# Patient Record
Sex: FEMALE | Race: WHITE | NOT HISPANIC OR LATINO | Employment: FULL TIME | URBAN - METROPOLITAN AREA
[De-identification: names, ages, dates, MRNs, and addresses within clinical notes are randomized per-mention and may not be internally consistent; named-entity substitution may affect disease eponyms.]

---

## 2021-04-08 DIAGNOSIS — Z23 ENCOUNTER FOR IMMUNIZATION: ICD-10-CM

## 2024-02-26 ENCOUNTER — EVALUATION (OUTPATIENT)
Dept: PHYSICAL THERAPY | Facility: CLINIC | Age: 59
End: 2024-02-26
Payer: COMMERCIAL

## 2024-02-26 VITALS — DIASTOLIC BLOOD PRESSURE: 80 MMHG | SYSTOLIC BLOOD PRESSURE: 115 MMHG

## 2024-02-26 DIAGNOSIS — M25.521 RIGHT ELBOW PAIN: Primary | ICD-10-CM

## 2024-02-26 PROCEDURE — 97110 THERAPEUTIC EXERCISES: CPT

## 2024-02-26 PROCEDURE — 97161 PT EVAL LOW COMPLEX 20 MIN: CPT

## 2024-02-26 NOTE — PROGRESS NOTES
PT Evaluation     Today's date: 2024  Patient name: Theresa Kim  : 1965  MRN: [id number]  Referring provider: Joselin Brumfield DO  Dx:   Encounter Diagnosis     ICD-10-CM    1. Right elbow pain  M25.521           Start Time: 0815  Stop Time: 0855  Total time in clinic (min): 40 minutes    Assessment  Assessment details: Theresa is a pleasant 57 yo female presenting to OP PT secondary to Right elbow pain with insidious onset 1 year ago. She denies DALLIN, and reports no numbness/tingling. Pt presents with wrist and elbow strength deficits, periscap strength deficits, and pain at end range elbow/flex ext. Pain is increased along triceps with palmation and increase posterior medial elbow pain with valgus stress test. Pt would benefit from skilled PT to address stated deficits and improve tolerance to daily activiites and improve maximal function.  Impairments: impaired physical strength and pain with function    Symptom irritability: moderateUnderstanding of Dx/Px/POC: good   Prognosis: good    Goals  Pt will demonstrate [place] with progressive home exercise program to assist with PT carry over and prevent recurrence of symptoms in the future  Pt will demonstrate 20% improvement in FOTO score to increase tolerance to functional return.   Pt will demonstrate 20 deg improvement in shoulder ROM to increase tolerance to reaching overhead, behind neck, and behind back to increase ease with ADLs such dressing/bathing  Pt will demonstrate 4+/5 in UE strength to allow for improved tolerance to lifting items overhead.   Pt will demonstrate 4+/5 in periscap strength to improve posture in sitting.      Plan  Patient would benefit from: PT eval and skilled physical therapy  Planned modality interventions: TENS, manual electrical stimulation, low level laser therapy, thermotherapy: hydrocollator packs and cryotherapy  Planned therapy interventions: IASTM, joint mobilization, manual therapy, massage, nerve  gliding, patient education, neuromuscular re-education, stretching, strengthening, therapeutic activities and therapeutic exercise  Frequency: 2x week  Plan of Care beginning date: 2024  Plan of Care expiration date: 2024  Treatment plan discussed with: patient        Subjective Evaluation    History of Present Illness  Mechanism of injury: Theresa is a 59 yo female presenting to OP PT secondary to Right elbow pain with onset 1 year ago. Pt reports sx are aggravated with working on computer using mouse and with maintaining elbow in end range positions. She reports weakness with carrying items, working overhead, and completing yard work. She reports some relief with Advil, massage, and compression stretch.          Recurrent probem    Quality of life: good    Patient Goals  Patient goals for therapy: increased strength  Patient goal: Stronger and less pain;  Pain  Current pain ratin  At best pain ratin  At worst pain ratin  Location: Elbow  Quality: dull ache  Aggravating factors: lifting, sitting, standing and stair climbing    Social Support  Stairs in house: yes   Lives in: multiple-level home  Lives with: significant other    Employment status: working  Hand dominance: right      Diagnostic Tests  X-ray: normal  Treatments  No previous or current treatments        Objective     Observations     Right Elbow   Negative for edema, effusion and incisions.     Palpation     Right   Tenderness of the triceps and wrist flexors.     Active Range of Motion     Right Elbow   Normal active range of motion  Elbow hyperextension  Extension: with pain    Passive Range of Motion     Right Elbow   Normal passive range of motion  Extension: with pain    Joint Play     Right Elbow   Joints within functional limits are the humeroulnar joint, humeroradial joint and proximal radioulnar joint.     Strength/Myotome Testing     Right Elbow   Flexion: 4  Extension: 4  Forearm supination: 4  Forearm pronation:  4    Right Wrist/Hand   Wrist extension: 4-  Wrist flexion: 4-  Radial deviation: 3+  Ulnar deviation: 3+    Tests     Right Elbow   Positive active medial epicondylitis, moving valgus stress, passive medial epicondylitis and valgus stress at 30 degrees.   Negative Cozen's, elbow flexion, handshake, milking maneuver, pronator compression, radial tunnel and valgus stress at 0 degrees.       Diagnosis:  Right elbow pain   Precautions:  HTN   Comparable signs 1) End range elbow extension   2) pain with wrist flexion  3)    Primary Impairments: 1)  Right wrist and elbow weakness  2) Right periscap strength deficits   Patient Goals Work at her computer without discomfort               Precautions: No past medical history on file. HTN    Date 2/26            Visit # 1            FOTO done            Re-eval                     Manuals 2/26            PROM             Elbow mobilization             Taping Tricep FH                         Neuro Re-Ed             Rhythmic stab             Prone Ys, Ts, Is 2x10 Ys, Ts                                                                             Ther Ex             Elbow flex/ext             Tricep ext             Rows, Ext              Wrist flex, ext 10''x3             strengthening              No moneys              UBE                          Ther Activity                                       Gait Training                                       Modalities

## 2024-02-26 NOTE — LETTER
2024      No Recipients    Patient: Theresa Kim   YOB: 1965   Date of Visit: 2024     Encounter Diagnosis     ICD-10-CM    1. Right elbow pain  M25.521           Dear Dr. Brumfield:    Thank you for your recent referral of Theresa Kim. Please review the attached evaluation summary from Theresa's recent visit.     Please verify that you agree with the plan of care by signing the attached order.     If you have any questions or concerns, please do not hesitate to call.     I sincerely appreciate the opportunity to share in the care of one of your patients and hope to have another opportunity to work with you in the near future.       Sincerely,    Elvi Greene, PT      Referring Provider:      I certify that I have read the below Plan of Care and certify the need for these services furnished under this plan of treatment while under my care.                    Joselin Brumfield DO  [address] [place] [id number]  Via Fax: [phone]          PT Evaluation     Today's date: 2024  Patient name: Theresa Kim  : 1965  MRN: [id number]  Referring provider: Joselin Brumfield DO  Dx:   Encounter Diagnosis     ICD-10-CM    1. Right elbow pain  M25.521           Start Time: 0815  Stop Time: 0855  Total time in clinic (min): 40 minutes    Assessment  Assessment details: Theresa is a pleasant 57 yo female presenting to OP PT secondary to Right elbow pain with insidious onset 1 year ago. She denies DALLIN, and reports no numbness/tingling. Pt presents with wrist and elbow strength deficits, periscap strength deficits, and pain at end range elbow/flex ext. Pain is increased along triceps with palmation and increase posterior medial elbow pain with valgus stress test. Pt would benefit from skilled PT to address stated deficits and improve tolerance to daily activiites and improve maximal function.  Impairments: impaired physical strength and pain with  function    Symptom irritability: moderateUnderstanding of Dx/Px/POC: good   Prognosis: good    Goals  Pt will demonstrate [place] with progressive home exercise program to assist with PT carry over and prevent recurrence of symptoms in the future  Pt will demonstrate 20% improvement in FOTO score to increase tolerance to functional return.   Pt will demonstrate 20 deg improvement in shoulder ROM to increase tolerance to reaching overhead, behind neck, and behind back to increase ease with ADLs such dressing/bathing  Pt will demonstrate 4+/5 in UE strength to allow for improved tolerance to lifting items overhead.   Pt will demonstrate 4+/5 in periscap strength to improve posture in sitting.      Plan  Patient would benefit from: PT eval and skilled physical therapy  Planned modality interventions: TENS, manual electrical stimulation, low level laser therapy, thermotherapy: hydrocollator packs and cryotherapy  Planned therapy interventions: IASTM, joint mobilization, manual therapy, massage, nerve gliding, patient education, neuromuscular re-education, stretching, strengthening, therapeutic activities and therapeutic exercise  Frequency: 2x week  Plan of Care beginning date: 2024  Plan of Care expiration date: 2024  Treatment plan discussed with: patient        Subjective Evaluation    History of Present Illness  Mechanism of injury: Theresa is a 57 yo female presenting to OP PT secondary to Right elbow pain with onset 1 year ago. Pt reports sx are aggravated with working on computer using mouse and with maintaining elbow in end range positions. She reports weakness with carrying items, working overhead, and completing yard work. She reports some relief with Advil, massage, and compression stretch.          Recurrent probem    Quality of life: good    Patient Goals  Patient goals for therapy: increased strength  Patient goal: Stronger and less pain;  Pain  Current pain ratin  At best pain rating:  1  At worst pain ratin  Location: Elbow  Quality: dull ache  Aggravating factors: lifting, sitting, standing and stair climbing    Social Support  Stairs in house: yes   Lives in: multiple-level home  Lives with: significant other    Employment status: working  Hand dominance: right      Diagnostic Tests  X-ray: normal  Treatments  No previous or current treatments        Objective     Observations     Right Elbow   Negative for edema, effusion and incisions.     Palpation     Right   Tenderness of the triceps and wrist flexors.     Active Range of Motion     Right Elbow   Normal active range of motion  Elbow hyperextension  Extension: with pain    Passive Range of Motion     Right Elbow   Normal passive range of motion  Extension: with pain    Joint Play     Right Elbow   Joints within functional limits are the humeroulnar joint, humeroradial joint and proximal radioulnar joint.     Strength/Myotome Testing     Right Elbow   Flexion: 4  Extension: 4  Forearm supination: 4  Forearm pronation: 4    Right Wrist/Hand   Wrist extension: 4-  Wrist flexion: 4-  Radial deviation: 3+  Ulnar deviation: 3+    Tests     Right Elbow   Positive active medial epicondylitis, moving valgus stress, passive medial epicondylitis and valgus stress at 30 degrees.   Negative Cozen's, elbow flexion, handshake, milking maneuver, pronator compression, radial tunnel and valgus stress at 0 degrees.       Diagnosis:  Right elbow pain   Precautions:  HTN   Comparable signs 1) End range elbow extension   2) pain with wrist flexion  3)    Primary Impairments: 1)  Right wrist and elbow weakness  2) Right periscap strength deficits   Patient Goals Work at her computer without discomfort               Precautions: No past medical history on file. HTN    Date             Visit # 1            FOTO done            Re-eval                     Manuals             PROM             Elbow mobilization             Taping Tricep FH                          Neuro Re-Ed             Rhythmic stab             Prone Ys, Ts, Is 2x10 Ys, Ts                                                                             Ther Ex             Elbow flex/ext             Tricep ext             Rows, Ext              Wrist flex, ext 10''x3             strengthening              No moneys              UBE                          Ther Activity                                       Gait Training                                       Modalities

## 2024-03-01 ENCOUNTER — OFFICE VISIT (OUTPATIENT)
Dept: PHYSICAL THERAPY | Facility: CLINIC | Age: 59
End: 2024-03-01
Payer: COMMERCIAL

## 2024-03-01 DIAGNOSIS — M25.521 RIGHT ELBOW PAIN: Primary | ICD-10-CM

## 2024-03-01 PROCEDURE — 97112 NEUROMUSCULAR REEDUCATION: CPT

## 2024-03-01 PROCEDURE — 97140 MANUAL THERAPY 1/> REGIONS: CPT

## 2024-03-01 PROCEDURE — 97110 THERAPEUTIC EXERCISES: CPT

## 2024-03-01 NOTE — PROGRESS NOTES
Daily Note     Today's date: 3/1/2024  Patient name: Theresa Kim  : 1965  MRN: [id number]  Referring provider: Joselin Brumfield DO  Dx:   Encounter Diagnosis     ICD-10-CM    1. Right elbow pain  M25.521           Start Time: 730  Stop Time: 0808  Total time in clinic (min): 38 minutes    Subjective: Pt reports mild improvement in sx after completing stretches.       Objective: See treatment diary below      Assessment: Tolerated treatment well. She reports no elevation in sx with exercises completed this session. She id given cues on new exercises completed. Pt denies any elevation of sx during treatment session. Patient demonstrated fatigue post treatment, exhibited good technique with therapeutic exercises, and would benefit from continued PT      Plan: Continue per plan of care.      Precautions: No past medical history on file. HTN    Date 2/26 3/1           Visit # 1            FOTO done            Re-eval                     Manuals 2/26 3/1           PROM             Elbow mobilization             Taping Tricep [facility]           Tricep IASTM             Neuro Re-Ed             Rhythmic stab             Prone Ys, Ts, Is 2x10 Ys, Ts 2x10 Ys, Ts           Scap squeezes  2x10           Prone ext  5''x30                                                  Ther Ex             Wrist ext/flex DB  2# 2x10           Elbow flex/ext             Tricep ext             Rows, Ext              Wrist flex, ext 10''x3 15''x5            strengthening   R gripper 2x10           No moneys              UBE             Supination stretch  10''x3           Tricep stretch  3x30''           Supination  2# sup/pron 2x10           Ther Activity                                       Gait Training                                       Modalities

## 2024-03-04 ENCOUNTER — OFFICE VISIT (OUTPATIENT)
Dept: PHYSICAL THERAPY | Facility: CLINIC | Age: 59
End: 2024-03-04
Payer: COMMERCIAL

## 2024-03-04 DIAGNOSIS — M25.521 RIGHT ELBOW PAIN: Primary | ICD-10-CM

## 2024-03-04 PROCEDURE — 97140 MANUAL THERAPY 1/> REGIONS: CPT

## 2024-03-04 PROCEDURE — 97112 NEUROMUSCULAR REEDUCATION: CPT

## 2024-03-04 PROCEDURE — 97110 THERAPEUTIC EXERCISES: CPT

## 2024-03-04 NOTE — PROGRESS NOTES
Daily Note     Today's date: 3/4/2024  Patient name: Theresa Kmi  : 1965  MRN: [id number]  Referring provider: Joselin Brumfield DO  Dx:   Encounter Diagnosis     ICD-10-CM    1. Right elbow pain  M25.521                      Subjective: Patient reports no changes in her elbow pain at this time.        Objective: See treatment diary below      Assessment: Tolerated treatment well. Initiated cervical nerve glides and repeated cervical extension.  Patient reports no change in elbow pain but did not increase her symptoms.  Added nerve glides and cervical retraction to HEP and provided written instructions. Patient will continue to monitor her symptoms. Patient demonstrated fatigue post treatment, exhibited good technique with therapeutic exercises, and would benefit from continued PT      Plan: Continue per plan of care.      Precautions: No past medical history on file. HTN        Access Code: [email or web address]  URL: [email or web address]  Date: 2024  Prepared by: Nancy Colby    Exercises  - Median Nerve Flossing - Tray  - 1 x daily - 7 x weekly - 2 sets - 10 reps  - Ulnar Nerve Flossing  - 1 x daily - 7 x weekly - 2 sets - 10 reps  - Seated Cervical Retraction and Extension  - 1 x daily - 7 x weekly - 3 sets - 10 reps  - Neck Retraction  - 1 x daily - 7 x weekly - 3 sets - 10 reps    Date 2/26 3/1 3/4          Visit # 1 2 3          FOTO done            Re-eval                     Manuals 2/26 3/1 3/4          PROM             Elbow mobilization             Taping Tricep FH FH           Tricep IASTM  FH ksg          Neuro Re-Ed             Rhythmic stab             Prone Ys, Ts, Is 2x10 Ys, Ts 2x10 Ys, Ts 2x10  Y's/T's          Scap squeezes  2x10 2x10          Prone ext  5''x30           Median nerve glides   x10          Ulnar nerve glides   x10          Cerv retract/ext repeated   3x10          Seated thoracic ext   2x10                                    Cerivcal retraction   3x10    "       Ther Ex             Wrist ext/flex DB  2# 2x10 2# 2x10          Elbow flex/ext             Tricep ext             Rows, Ext              Wrist flex, ext 10''x3 15''x5            strengthening   R gripper 2x10           No moneys              UBE             Supination stretch  10''x3 10\" x3          Tricep stretch  3x30'' 3x30\"          Supination  2# sup/pron 2x10 2# sup/pron 2x10          Ther Activity                                       Gait Training                                       Modalities                                            "

## 2024-03-07 ENCOUNTER — OFFICE VISIT (OUTPATIENT)
Dept: PHYSICAL THERAPY | Facility: CLINIC | Age: 59
End: 2024-03-07
Payer: COMMERCIAL

## 2024-03-07 DIAGNOSIS — M25.521 RIGHT ELBOW PAIN: Primary | ICD-10-CM

## 2024-03-07 PROCEDURE — 97112 NEUROMUSCULAR REEDUCATION: CPT

## 2024-03-07 PROCEDURE — 97140 MANUAL THERAPY 1/> REGIONS: CPT

## 2024-03-07 PROCEDURE — 97110 THERAPEUTIC EXERCISES: CPT

## 2024-03-07 NOTE — PROGRESS NOTES
Daily Note     Today's date: 3/7/2024  Patient name: Theresa Kim  : 1965  MRN: [id number]  Referring provider: Joselin Brumfield DO  Dx:   Encounter Diagnosis     ICD-10-CM    1. Right elbow pain  M25.521           Start Time: 1615  Stop Time: 1700  Total time in clinic (min): 45 minutes    Subjective: Pt reports symptoms, but sx are mild, continues to be a 6/10.       Objective: See treatment diary below      Assessment: Tolerated treatment well. Pt was completing nerve tensioner for radial nerve at home, this was completed as well as median n. [place] today.  Pt reports she note some improvement with this. She reports elbow soreness with stretches and exercises. Patient demonstrated fatigue post treatment, exhibited good technique with therapeutic exercises, and would benefit from continued PT      Plan: Continue per plan of care.      Precautions: No past medical history on file. HTN        Access Code: [email or web address]  URL: [email or web address]  Date: 2024  Prepared by: Nancy Colby    Exercises  - Median Nerve Flossing - Tray  - 1 x daily - 7 x weekly - 2 sets - 10 reps  - Ulnar Nerve Flossing  - 1 x daily - 7 x weekly - 2 sets - 10 reps  - Seated Cervical Retraction and Extension  - 1 x daily - 7 x weekly - 3 sets - 10 reps  - Neck Retraction  - 1 x daily - 7 x weekly - 3 sets - 10 reps    Date 2/26 3/1 3/4 3/7         Visit # 1 2 3 4         FOTO done            Re-eval                     Manuals 2/26 3/1 3/4 3/7         PROM             Tspine    P to A grade 5  (2x)         Elbow mobilization    FH and cervical mobe 3-4         Taping Tricep FH FH           Tricep IASTM  FH ksg          Neuro Re-Ed             Rhythmic stab             Prone Ys, Ts, Is 2x10 Ys, Ts 2x10 Ys, Ts 2x10  Y's/T's 2x10  Y's/T's         Scap squeezes  2x10 2x10          Prone ext  5''x30           Median nerve glides   x10 2x10 5''         Ulnar nerve glides   x10 Radial n. Tensioner 2x10 5''        "  Cerv retract/ext repeated   3x10 NV         Seated thoracic ext   2x10 NV                                   Cerivcal retraction   3x10          Ther Ex             Wrist ext/flex DB  2# 2x10 2# 2x10          Elbow flex/ext             Tricep ext             Rows, Ext              Wrist flex, ext 10''x3 15''x5            strengthening   R gripper 2x10           No moneys              UBE             Supination stretch  10''x3 10\" x3          Tricep stretch  3x30'' 3x30\"          Supination  2# sup/pron 2x10 2# sup/pron 2x10          Ther Activity                                       Gait Training                                       Modalities                                              "

## 2024-03-12 ENCOUNTER — OFFICE VISIT (OUTPATIENT)
Dept: PHYSICAL THERAPY | Facility: CLINIC | Age: 59
End: 2024-03-12
Payer: COMMERCIAL

## 2024-03-12 DIAGNOSIS — M25.521 RIGHT ELBOW PAIN: Primary | ICD-10-CM

## 2024-03-12 PROCEDURE — 97112 NEUROMUSCULAR REEDUCATION: CPT

## 2024-03-12 PROCEDURE — 97110 THERAPEUTIC EXERCISES: CPT

## 2024-03-12 PROCEDURE — 97140 MANUAL THERAPY 1/> REGIONS: CPT

## 2024-03-12 NOTE — PROGRESS NOTES
Daily Note     Today's date: 3/12/2024  Patient name: Theresa Kim  : 1965  MRN: [id number]  Referring provider: Joselin Brumfield DO  Dx:   Encounter Diagnosis     ICD-10-CM    1. Right elbow pain  M25.521           Start Time: 1743  Stop Time:   Total time in clinic (min): 44 minutes    Subjective: Pt reports sx have been steady.       Objective: See treatment diary below      Assessment: Theresa tolerated treatment well with consistent cuing throughout. TE's were performed with the same resistance and reps. No new TE's were performed today. Following treatment, the patient demonstrated fatigue post treatment, exhibited good technique with therapeutic exercises, and would benefit from continued PT.         Plan: Continue per plan of care.      Precautions: No past medical history on file. HTN        Access Code: [email or web address]  URL: [email or web address]  Date: 2024  Prepared by: Nancy Colby    Exercises  - Median Nerve Flossing - Tray  - 1 x daily - 7 x weekly - 2 sets - 10 reps  - Ulnar Nerve Flossing  - 1 x daily - 7 x weekly - 2 sets - 10 reps  - Seated Cervical Retraction and Extension  - 1 x daily - 7 x weekly - 3 sets - 10 reps  - Neck Retraction  - 1 x daily - 7 x weekly - 3 sets - 10 reps    Date 2/26 3/1 3/4 3/7 3/12          Visit # 1 2 3 4 5          FOTO done              Re-eval                       Manuals 2/26 3/1 3/4 3/7 3/12       PROM            Tspine    P to A grade 5  (2x)        Elbow mobilization    FH and cervical mobe 3-4 FH       Taping Tricep FH FH   FH       Tricep IASTM  FH ksg         Neuro Re-Ed            Rhythmic stab            Prone Ys, Ts, Is 2x10 Ys, Ts 2x10 Ys, Ts 2x10  Y's/T's 2x10  Y's/T's 2x10  Y's/T's       Scap squeezes  2x10 2x10         Prone ext  5''x30          Median nerve glides   x10 2x10 5'' 2x10 5''       Ulnar nerve glides   x10 Radial n. Tensioner 2x10 5'' Radial n. Glide        Cerv retract/ext repeated   3x10 NV 3x10      "  Seated thoracic ext   2x10 NV 2x10                               Cerivcal retraction   3x10         Ther Ex            Wrist ext/flex DB  2# 2x10 2# 2x10  2# 2x10 ea       Elbow flex/ext            Tricep ext            Rows, Ext             Wrist flex, ext 10''x3 15''x5   15''x5        strengthening   R gripper 2x10          No moneys             UBE            Supination stretch  10''x3 10\" x3  15''x3       Tricep stretch  3x30'' 3x30\"         Supination  2# sup/pron 2x10 2# sup/pron 2x10  2# sup/pron 2x10       Ther Activity                                    Gait Training                                    Modalities                                             "

## 2024-03-15 ENCOUNTER — OFFICE VISIT (OUTPATIENT)
Dept: PHYSICAL THERAPY | Facility: CLINIC | Age: 59
End: 2024-03-15
Payer: COMMERCIAL

## 2024-03-15 DIAGNOSIS — M25.521 RIGHT ELBOW PAIN: Primary | ICD-10-CM

## 2024-03-15 PROCEDURE — 97110 THERAPEUTIC EXERCISES: CPT

## 2024-03-15 PROCEDURE — 97112 NEUROMUSCULAR REEDUCATION: CPT

## 2024-03-15 PROCEDURE — 97140 MANUAL THERAPY 1/> REGIONS: CPT

## 2024-03-15 NOTE — PROGRESS NOTES
Daily Note     Today's date: 3/15/2024  Patient name: Theresa Kim  : 1965  MRN: [id number]  Referring provider: Joselin Brumfield DO  Dx:   Encounter Diagnosis     ICD-10-CM    1. Right elbow pain  M25.521           Start Time: 728  Stop Time: 807  Total time in clinic (min): 39 minutes    Subjective: Pt reports sx are minimal today. She notes some peripheralization of sx after previous session which had decreased after a day or so.       Objective: See treatment diary below      Assessment: Tolerated treatment well. Patient demonstrated fatigue post treatment, exhibited good technique with therapeutic exercises, and would benefit from continued PT      Plan: Continue per plan of care.      Precautions: No past medical history on file. HTN        Access Code: [email or web address]  URL: [email or web address]  Date: 2024  Prepared by: Nancy Colby    Exercises  - Median Nerve Flossing - Tray  - 1 x daily - 7 x weekly - 2 sets - 10 reps  - Ulnar Nerve Flossing  - 1 x daily - 7 x weekly - 2 sets - 10 reps  - Seated Cervical Retraction and Extension  - 1 x daily - 7 x weekly - 3 sets - 10 reps  - Neck Retraction  - 1 x daily - 7 x weekly - 3 sets - 10 reps    Date 2/26 3/1 3/4 3/7 3/12 3/15         Visit # 1 2 3 4 5 6         FOTO done              Re-eval                       Manuals 2/26 3/1 3/4 3/7 3/12 3/15      PROM            Tspine    P to A grade 5  (2x)        Elbow mobilization    FH and cervical mobe 3-4 FH FH      Taping Tricep FH FH   FH FH      Tricep IASTM  FH ksg   Cupping 3 min      Neuro Re-Ed            Rhythmic stab            Prone Ys, Ts, Is 2x10 Ys, Ts 2x10 Ys, Ts 2x10  Y's/T's 2x10  Y's/T's 2x10  Y's/T's       Scap squeezes  2x10 2x10         Prone ext  5''x30          Median nerve glides   x10 2x10 5'' 2x10 5'' 2x10 5''      Ulnar nerve glides   x10 Radial n. Tensioner 2x10 5'' Radial n. Glide  Radial n. Glide       Cerv retract/ext repeated   3x10 NV 3x10 3x10     "  Seated thoracic ext   2x10 NV 2x10 2x10                              Cerivcal retraction   3x10         Ther Ex            Wrist ext/flex DB  2# 2x10 2# 2x10  2# 2x10 ea 2# 2x10 ea      Elbow flex/ext            Tricep ext            Rows, Ext             Wrist flex, ext 10''x3 15''x5   15''x5 15''x5       strengthening   R gripper 2x10    R gripper 2x10      No moneys             UBE            Supination stretch  10''x3 10\" x3  15''x3       Tricep stretch  3x30'' 3x30\"         Supination  2# sup/pron 2x10 2# sup/pron 2x10  2# sup/pron 2x10 2# sup/pron 2x10      Ther Activity                                    Gait Training                                    Modalities                                               "

## 2024-04-01 ENCOUNTER — APPOINTMENT (OUTPATIENT)
Dept: PHYSICAL THERAPY | Facility: CLINIC | Age: 59
End: 2024-04-01
Payer: COMMERCIAL

## 2024-04-04 ENCOUNTER — OFFICE VISIT (OUTPATIENT)
Dept: PHYSICAL THERAPY | Facility: CLINIC | Age: 59
End: 2024-04-04
Payer: COMMERCIAL

## 2024-04-04 DIAGNOSIS — M25.521 RIGHT ELBOW PAIN: Primary | ICD-10-CM

## 2024-04-04 PROCEDURE — 97112 NEUROMUSCULAR REEDUCATION: CPT | Performed by: PHYSICAL THERAPIST

## 2024-04-04 PROCEDURE — 97140 MANUAL THERAPY 1/> REGIONS: CPT | Performed by: PHYSICAL THERAPIST

## 2024-04-04 PROCEDURE — 97110 THERAPEUTIC EXERCISES: CPT | Performed by: PHYSICAL THERAPIST

## 2024-04-04 NOTE — LETTER
2024    Joselin Brumfield DO  [address] [facility] [address]    Patient: Theresa Kim   YOB: 1965   Date of Visit: 2024     Encounter Diagnosis     ICD-10-CM    1. Right elbow pain  M25.521           Dear Dr. Brumfield:    Thank you for your recent referral of Theresa Kim. Please review the attached evaluation summary from Theresa's recent visit.     Please verify that you agree with the plan of care by signing the attached order.     If you have any questions or concerns, please do not hesitate to call.     I sincerely appreciate the opportunity to share in the care of one of your patients and hope to have another opportunity to work with you in the near future.       Sincerely,    Kassandra Mane, PT      Referring Provider:      I certify that I have read the below Plan of Care and certify the need for these services furnished under this plan of treatment while under my care.                    Joselin Brumfield DO  [address] [facility] [address]  Via Fax: [phone]          PT Evaluation     Today's date: 2024  Patient name: Theresa Kim  : 1965  MRN: [id number]  Referring provider: Joselin Brumfield DO  Dx:   Encounter Diagnosis     ICD-10-CM    1. Right elbow pain  M25.521               Assessment  Assessment details: Theresa is a pleasant 57 yo female presenting to OP PT secondary to Right elbow pain with insidious onset 1 year ago. She denies DALLIN, and reports no numbness/tingling. She reports having improvements with her sxs however increased sxs this week due to returning to work after vacation. Pt presents with wrist and elbow strength deficits, periscap strength deficits, and pain at end range elbow/flex ext. Pain is increased along triceps with palmation and increase posterior medial elbow pain with valgus stress test. Pt would benefit from skilled PT to address stated deficits and improve tolerance to daily  activiites and improve maximal function.  Impairments: impaired physical strength and pain with function    Symptom irritability: moderateUnderstanding of Dx/Px/POC: good   Prognosis: good    Goals  Pt will demonstrate [place] with progressive home exercise program to assist with PT carry over and prevent recurrence of symptoms in the future - Progressing  Pt will demonstrate 20% improvement in FOTO score to increase tolerance to functional return. - Progressing   Pt will demonstrate 20 deg improvement in shoulder ROM to increase tolerance to reaching overhead, behind neck, and behind back to increase ease with ADLs such dressing/bathing - Progressing   Pt will demonstrate 4+/5 in UE strength to allow for improved tolerance to lifting items overhead. - Progressing   Pt will demonstrate 4+/5 in periscap strength to improve posture in sitting. - Progressing       Plan  Patient would benefit from: PT eval and skilled physical therapy  Planned modality interventions: TENS, manual electrical stimulation, low level laser therapy, thermotherapy: hydrocollator packs and cryotherapy  Planned therapy interventions: IASTM, joint mobilization, manual therapy, massage, nerve gliding, patient education, neuromuscular re-education, stretching, strengthening, therapeutic activities and therapeutic exercise  Frequency: 2x week  Plan of Care beginning date: 4/5/2024  Plan of Care expiration date: 6/28/2024  Treatment plan discussed with: patient        Subjective Evaluation    History of Present Illness    RE 4/4/24: Patient was away on vacation for two weeks and reports that her sxs did improve. She did keep up with her nerve glide exercises. The pain has returned since returning to work and using the mouse. She has been at a constant 7/10 pain level this week.     Mechanism of injury: Theresa is a 57 yo female presenting to OP PT secondary to Right elbow pain with onset 1 year ago. Pt reports sx are aggravated with working on  computer using mouse and with maintaining elbow in end range positions. She reports weakness with carrying items, working overhead, and completing yard work. She reports some relief with Advil, massage, and compression stretch.          Recurrent probem    Quality of life: good    Patient Goals  Patient goals for therapy: increased strength  Patient goal: Stronger and less pain;  Pain  Current pain ratin  At best pain ratin  At worst pain ratin  Location: Elbow  Quality: dull ache  Aggravating factors: lifting, sitting, standing and stair climbing, opening jars    Social Support  Stairs in house: yes   Lives in: multiple-level home  Lives with: significant other    Employment status: working  Hand dominance: right      Diagnostic Tests  X-ray: normal  Treatments  No previous or current treatments        Objective     Observations     Right Elbow   Negative for edema, effusion and incisions.     Palpation     Right   Tenderness of the triceps and wrist flexors.     Active Range of Motion     Right Elbow   Normal active range of motion  Elbow hyperextension  Extension: with pain    CROM:  Flexion: TBA  Extension: TBA  Rotation: (R) TBA (L) TBA  Lat: (R) TBA (L) TBA    Passive Range of Motion     Right Elbow   Normal passive range of motion  Extension: with pain    Joint Play     Right Elbow   Joints within functional limits are the humeroulnar joint, humeroradial joint and proximal radioulnar joint.     Strength/Myotome Testing     Right Elbow   Flexion: 4  Extension: 4  Forearm supination: 4  Forearm pronation: 4    Right Wrist/Hand   Wrist extension: 4-  Wrist flexion: 4-  Radial deviation: 3+  Ulnar deviation: 3+    UT: (R) TBA (L) TBA  MT: (T) TBA (L) TBA  LT: (R) TBA (L) TBA        Tests     Right Elbow   Positive active medial epicondylitis, moving valgus stress, passive medial epicondylitis and valgus stress at 30 degrees.   Negative Cozen's, elbow flexion, handshake, milking maneuver, pronator  compression, radial tunnel and valgus stress at 0 degrees.       Diagnosis:  Right elbow pain   Precautions:  HTN   Comparable signs 1) End range elbow extension   2) pain with wrist flexion  3)    Primary Impairments: 1)  Right wrist and elbow weakness  2) Right periscap strength deficits   Patient Goals Work at her computer without discomfort               Precautions: No past medical history on file. HTN    Exercises  - Median Nerve Flossing - Tray  - 1 x daily - 7 x weekly - 2 sets - 10 reps  - Ulnar Nerve Flossing  - 1 x daily - 7 x weekly - 2 sets - 10 reps  - Seated Cervical Retraction and Extension  - 1 x daily - 7 x weekly - 3 sets - 10 reps  - Neck Retraction  - 1 x daily - 7 x weekly - 3 sets - 10 reps    Date 2/26 3/1 3/4 3/7 3/12 3/15 4/4        Visit # 1 2 3 4 5 6 7        FOTO done      X        Re-eval       X                Manuals 2/26 3/1 3/4 3/7 3/12 3/15 4/4     PROM            Tspine    P to A grade 5  (2x)        Elbow mobilization    FH and cervical mobe 3-4 FH FH SMF     Taping Tricep FH FH   FH FH                  Tricep IASTM  FH ksg   Cupping 3 min SMF                 PA glides cervical        NV?     Cervical jt mobs       NV?                 Neuro Re-Ed            Rhythmic stab            Prone Ys, Ts, Is 2x10 Ys, Ts 2x10 Ys, Ts 2x10  Y's/T's 2x10  Y's/T's 2x10  Y's/T's       Scap squeezes  2x10 2x10         Prone ext  5''x30          Median nerve glides   x10 2x10 5'' 2x10 5'' 2x10 5'' 2 sec; 2x10     Ulnar nerve glides   x10 Radial n. Tensioner 2x10 5'' Radial n. Glide  Radial n. Glide       Radial N glides with head turn to the left       2x10     Cerv retract/ext repeated   3x10 NV 3x10 3x10      SNAGs Cervical Extension       NV?     Seated thoracic ext   2x10 NV 2x10 2x10      Standing Thoracic Extension with hands behind head with elbows sliding up wall       2 sec; 10x     Bicep stretch using biodex       20 sec x 3                  Supine Pec stretch on half foam (lying)         "NV?     Sideying Thoracic Rotation stretch       NV?     Cerivcal retraction   3x10         Ther Ex            Wrist ext/flex DB  2# 2x10 2# 2x10  2# 2x10 ea 2# 2x10 ea 2# 3x10 ea     Elbow flex/ext            Tricep ext            Rows, Ext             Wrist flex, ext 10''x3 15''x5   15''x5 15''x5 15 sec x 5 ea      strengthening   R gripper 2x10    R gripper 2x10      No moneys             UBE            Supination stretch  10''x3 10\" x3  15''x3       Tricep stretch  3x30'' 3x30\"         Supination  2# sup/pron 2x10 2# sup/pron 2x10  2# sup/pron 2x10 2# sup/pron 2x10 3# 3x10 (supination and pronation)      Ther Activity                                    Gait Training                                    Modalities                                                               "

## 2024-04-04 NOTE — PROGRESS NOTES
PT Evaluation     Today's date: 2024  Patient name: Theresa Kim  : 1965  MRN: [id number]  Referring provider: Joselin Brumfield DO  Dx:   Encounter Diagnosis     ICD-10-CM    1. Right elbow pain  M25.521               Assessment  Assessment details: Theresa is a pleasant 59 yo female presenting to OP PT secondary to Right elbow pain with insidious onset 1 year ago. She denies DALLIN, and reports no numbness/tingling. She reports having improvements with her sxs however increased sxs this week due to returning to work after vacation. Pt presents with wrist and elbow strength deficits, periscap strength deficits, and pain at end range elbow/flex ext. Pain is increased along triceps with palmation and increase posterior medial elbow pain with valgus stress test. Pt would benefit from skilled PT to address stated deficits and improve tolerance to daily activiites and improve maximal function.  Impairments: impaired physical strength and pain with function    Symptom irritability: moderateUnderstanding of Dx/Px/POC: good   Prognosis: good    Goals  Pt will demonstrate [place] with progressive home exercise program to assist with PT carry over and prevent recurrence of symptoms in the future - Progressing  Pt will demonstrate 20% improvement in FOTO score to increase tolerance to functional return. - Progressing   Pt will demonstrate 20 deg improvement in shoulder ROM to increase tolerance to reaching overhead, behind neck, and behind back to increase ease with ADLs such dressing/bathing - Progressing   Pt will demonstrate 4+/5 in UE strength to allow for improved tolerance to lifting items overhead. - Progressing   Pt will demonstrate 4+/5 in periscap strength to improve posture in sitting. - Progressing       Plan  Patient would benefit from: PT eval and skilled physical therapy  Planned modality interventions: TENS, manual electrical stimulation, low level laser therapy, thermotherapy:  hydrocollator packs and cryotherapy  Planned therapy interventions: IASTM, joint mobilization, manual therapy, massage, nerve gliding, patient education, neuromuscular re-education, stretching, strengthening, therapeutic activities and therapeutic exercise  Frequency: 2x week  Plan of Care beginning date: 2024  Plan of Care expiration date: 2024  Treatment plan discussed with: patient        Subjective Evaluation    History of Present Illness    RE 24: Patient was away on vacation for two weeks and reports that her sxs did improve. She did keep up with her nerve glide exercises. The pain has returned since returning to work and using the mouse. She has been at a constant 7/10 pain level this week.     Mechanism of injury: Theresa is a 57 yo female presenting to OP PT secondary to Right elbow pain with onset 1 year ago. Pt reports sx are aggravated with working on computer using mouse and with maintaining elbow in end range positions. She reports weakness with carrying items, working overhead, and completing yard work. She reports some relief with Advil, massage, and compression stretch.          Recurrent probem    Quality of life: good    Patient Goals  Patient goals for therapy: increased strength  Patient goal: Stronger and less pain;  Pain  Current pain ratin  At best pain ratin  At worst pain ratin  Location: Elbow  Quality: dull ache  Aggravating factors: lifting, sitting, standing and stair climbing, opening jars    Social Support  Stairs in house: yes   Lives in: multiple-level home  Lives with: significant other    Employment status: working  Hand dominance: right      Diagnostic Tests  X-ray: normal  Treatments  No previous or current treatments        Objective     Observations     Right Elbow   Negative for edema, effusion and incisions.     Palpation     Right   Tenderness of the triceps and wrist flexors.     Active Range of Motion     Right Elbow   Normal active range of  motion  Elbow hyperextension  Extension: with pain    CROM:  Flexion: TBA  Extension: TBA  Rotation: (R) TBA (L) TBA  Lat: (R) TBA (L) TBA    Passive Range of Motion     Right Elbow   Normal passive range of motion  Extension: with pain    Joint Play     Right Elbow   Joints within functional limits are the humeroulnar joint, humeroradial joint and proximal radioulnar joint.     Strength/Myotome Testing     Right Elbow   Flexion: 4  Extension: 4  Forearm supination: 4  Forearm pronation: 4    Right Wrist/Hand   Wrist extension: 4-  Wrist flexion: 4-  Radial deviation: 3+  Ulnar deviation: 3+    UT: (R) TBA (L) TBA  MT: (T) TBA (L) TBA  LT: (R) TBA (L) TBA        Tests     Right Elbow   Positive active medial epicondylitis, moving valgus stress, passive medial epicondylitis and valgus stress at 30 degrees.   Negative Cozen's, elbow flexion, handshake, milking maneuver, pronator compression, radial tunnel and valgus stress at 0 degrees.       Diagnosis:  Right elbow pain   Precautions:  HTN   Comparable signs 1) End range elbow extension   2) pain with wrist flexion  3)    Primary Impairments: 1)  Right wrist and elbow weakness  2) Right periscap strength deficits   Patient Goals Work at her computer without discomfort               Precautions: No past medical history on file. HTN    Exercises  - Median Nerve Flossing - Tray  - 1 x daily - 7 x weekly - 2 sets - 10 reps  - Ulnar Nerve Flossing  - 1 x daily - 7 x weekly - 2 sets - 10 reps  - Seated Cervical Retraction and Extension  - 1 x daily - 7 x weekly - 3 sets - 10 reps  - Neck Retraction  - 1 x daily - 7 x weekly - 3 sets - 10 reps    Date 2/26 3/1 3/4 3/7 3/12 3/15 4/4        Visit # 1 2 3 4 5 6 7        FOTO done      X        Re-eval       X                Manuals 2/26 3/1 3/4 3/7 3/12 3/15 4/4     PROM            Tspine    P to A grade 5  (2x)        Elbow mobilization    FH and cervical mobe 3-4 FH FH SMF     Taping Tricep FH FH   FH FH                 "  Tricep IASTM  FH ksg   Cupping 3 min SMF                 PA glides cervical        NV?     Cervical jt mobs       NV?                 Neuro Re-Ed            Rhythmic stab            Prone Ys, Ts, Is 2x10 Ys, Ts 2x10 Ys, Ts 2x10  Y's/T's 2x10  Y's/T's 2x10  Y's/T's       Scap squeezes  2x10 2x10         Prone ext  5''x30          Median nerve glides   x10 2x10 5'' 2x10 5'' 2x10 5'' 2 sec; 2x10     Ulnar nerve glides   x10 Radial n. Tensioner 2x10 5'' Radial n. Glide  Radial n. Glide       Radial N glides with head turn to the left       2x10     Cerv retract/ext repeated   3x10 NV 3x10 3x10      SNAGs Cervical Extension       NV?     Seated thoracic ext   2x10 NV 2x10 2x10      Standing Thoracic Extension with hands behind head with elbows sliding up wall       2 sec; 10x     Bicep stretch using biodex       20 sec x 3                  Supine Pec stretch on half foam (lying)        NV?     Sideying Thoracic Rotation stretch       NV?     Cerivcal retraction   3x10         Ther Ex            Wrist ext/flex DB  2# 2x10 2# 2x10  2# 2x10 ea 2# 2x10 ea 2# 3x10 ea     Elbow flex/ext            Tricep ext            Rows, Ext             Wrist flex, ext 10''x3 15''x5   15''x5 15''x5 15 sec x 5 ea      strengthening   R gripper 2x10    R gripper 2x10      No moneys             UBE            Supination stretch  10''x3 10\" x3  15''x3       Tricep stretch  3x30'' 3x30\"         Supination  2# sup/pron 2x10 2# sup/pron 2x10  2# sup/pron 2x10 2# sup/pron 2x10 3# 3x10 (supination and pronation)      Ther Activity                                    Gait Training                                    Modalities                                               "

## 2024-04-05 ENCOUNTER — APPOINTMENT (OUTPATIENT)
Dept: PHYSICAL THERAPY | Facility: CLINIC | Age: 59
End: 2024-04-05
Payer: COMMERCIAL

## 2024-04-08 ENCOUNTER — APPOINTMENT (OUTPATIENT)
Dept: PHYSICAL THERAPY | Facility: CLINIC | Age: 59
End: 2024-04-08
Payer: COMMERCIAL

## 2024-04-08 ENCOUNTER — OFFICE VISIT (OUTPATIENT)
Dept: PHYSICAL THERAPY | Facility: CLINIC | Age: 59
End: 2024-04-08
Payer: COMMERCIAL

## 2024-04-08 DIAGNOSIS — M25.521 RIGHT ELBOW PAIN: Primary | ICD-10-CM

## 2024-04-08 PROCEDURE — 97110 THERAPEUTIC EXERCISES: CPT | Performed by: PHYSICAL THERAPIST

## 2024-04-08 PROCEDURE — 97140 MANUAL THERAPY 1/> REGIONS: CPT | Performed by: PHYSICAL THERAPIST

## 2024-04-08 PROCEDURE — 97112 NEUROMUSCULAR REEDUCATION: CPT | Performed by: PHYSICAL THERAPIST

## 2024-04-08 NOTE — PROGRESS NOTES
Daily Note     Today's date: 2024  Patient name: Theresa Kim  : 1965  MRN: [id number]  Referring provider: Joselin Brumfield DO  Dx:   Encounter Diagnosis     ICD-10-CM    1. Right elbow pain  M25.521                      Subjective: Theresa feels like her pain continues to improve.  Is doing the nerve glides regularly and feels this is what helps her the most.      Objective: See treatment diary below      Assessment: Tolerated treatment well. Patient demonstrated fatigue post treatment.  Nerve mobility restrictions continue, patient would benefit from continued compliance to this. Would also continue to benefit from continued postural education.        Plan: Continue per plan of care.      Precautions: No past medical history on file. HTN        Access Code: [email or web address]  URL: [email or web address]  Date: 2024  Prepared by: Nancy Colby    Exercises  - Median Nerve Flossing - Tray  - 1 x daily - 7 x weekly - 2 sets - 10 reps  - Ulnar Nerve Flossing  - 1 x daily - 7 x weekly - 2 sets - 10 reps  - Seated Cervical Retraction and Extension  - 1 x daily - 7 x weekly - 3 sets - 10 reps  - Neck Retraction  - 1 x daily - 7 x weekly - 3 sets - 10 reps    Date 2/26 3/1 3/4 3/7 3/12 3/15         Visit # 1 2 3 4 5 6         FOTO done              Re-eval                     Manuals 2/26 3/1 3/4 3/7 3/12 3/15 4/4  4/8     PROM                     Tspine       P to A grade 5  (2x)             Elbow mobilization       FH and cervical mobe 3-4 FH FH SMF  ATS, pronation/supination mob, STM    Taping Tricep FH FH     FH FH                               Tricep IASTM   FH ksg     Cupping 3 min SMF                             PA glides cervical              NV?       Cervical jt mobs             NV?                             Neuro Re-Ed                     Rhythmic stab                     Prone Ys, Ts, Is 2x10 Ys, Ts 2x10 Ys, Ts 2x10  Y's/T's 2x10  Y's/T's 2x10  Y's/T's           Scap squeezes   " 2x10 2x10               Prone ext   5''x30                 Median nerve glides     x10 2x10 5'' 2x10 5'' 2x10 5'' 2 sec; 2x10  2 sec; 2x10     Ulnar nerve glides     x10 Radial n. Tensioner 2x10 5'' Radial n. Glide  Radial n. Glide          Radial N glides with head turn to the left             2x10  2x10     Cerv retract/ext repeated     3x10 NV 3x10 3x10         SNAGs Cervical Extension             NV?       Seated thoracic ext     2x10 NV 2x10 2x10         Standing Thoracic Extension with hands behind head with elbows sliding up wall             2 sec; 10x  NV     Bicep stretch using biodex             20 sec x 3   20 sec x 3                           Seated thoracic extension stretch              NV?  2x10     Sideying Thoracic Rotation stretch             NV?       Cerivcal retraction     3x10          10x     Ther Ex                     Wrist ext/flex DB   2# 2x10 2# 2x10   2# 2x10 ea 2# 2x10 ea 2# 3x10 ea  3# 3x10 ea.     Elbow flex/ext                     Tricep ext                     Rows, Ext                      Wrist flex, ext 10''x3 15''x5     15''x5 15''x5 15 sec x 5 ea  Home      strengthening    R gripper 2x10       R gripper 2x10         No moneys                      UBE                     Supination stretch   10''x3 10\" x3   15''x3           Tricep stretch   3x30'' 3x30\"               Supination   2# sup/pron 2x10 2# sup/pron 2x10   2# sup/pron 2x10 2# sup/pron 2x10 3# 3x10 (supination and pronation)   3# 3x10 (supination and pronation)      Ther Activity                                                                 Gait Training                                                                 Modalities                                                                                  "

## 2024-04-12 ENCOUNTER — OFFICE VISIT (OUTPATIENT)
Dept: PHYSICAL THERAPY | Facility: CLINIC | Age: 59
End: 2024-04-12
Payer: COMMERCIAL

## 2024-04-12 DIAGNOSIS — M25.521 RIGHT ELBOW PAIN: Primary | ICD-10-CM

## 2024-04-12 PROCEDURE — 97110 THERAPEUTIC EXERCISES: CPT | Performed by: PHYSICAL THERAPIST

## 2024-04-12 PROCEDURE — 97112 NEUROMUSCULAR REEDUCATION: CPT | Performed by: PHYSICAL THERAPIST

## 2024-04-12 PROCEDURE — 97140 MANUAL THERAPY 1/> REGIONS: CPT | Performed by: PHYSICAL THERAPIST

## 2024-04-12 NOTE — PROGRESS NOTES
Daily Note     Today's date: 2024  Patient name: Theresa Kim  : 1965  MRN: [id number]  Referring provider: Joselin Brumfield DO  Dx:   Encounter Diagnosis     ICD-10-CM    1. Right elbow pain  M25.521                      Subjective: She reports that she feels stiff today.       Objective: See treatment diary below      Assessment: Tolerated treatment well; initiated POC with strengthening exercises. Vcs provided on proper sequencing with exercises. MT performed after receiving consent from pt; trailed active release of forearm extensor and IASTM to triceps. Tried self distraction of elbow with flexion. She reports soreness but relief post session.   Patient demonstrated fatigue post treatment and would benefit from continued PT      Plan: Continue per plan of care.      Precautions: No past medical history on file. HTN        Access Code: [email or web address]  URL: [email or web address]  Date: 2024  Prepared by: Nancy Colby    Exercises  - Median Nerve Flossing - Tray  - 1 x daily - 7 x weekly - 2 sets - 10 reps  - Ulnar Nerve Flossing  - 1 x daily - 7 x weekly - 2 sets - 10 reps  - Seated Cervical Retraction and Extension  - 1 x daily - 7 x weekly - 3 sets - 10 reps  - Neck Retraction  - 1 x daily - 7 x weekly - 3 sets - 10 reps    Date 2/26 3/1 3/4 3/7 3/12 3/15         Visit # 1 2 3 4 5 6         FOTO done              Re-eval                     Manuals 2/26 3/1 3/4 3/7 3/12 3/15 4/4  4/8  4/12   PROM                     Tspine       P to A grade 5  (2x)             Elbow mobilization       FH and cervical mobe 3-4 FH FH SMF  ATS, pronation/supination mob, STM SMF with distraction, AP glides of RU jt   Taping Tricep FH FH     FH FH                               Tricep IASTM   FH ksg     Cupping 3 min SMF    SMF   IASTM along extensor mass                  SMF   Active release of Extensor mass         SMF (5x)   PA glides cervical              NV?       Cervical jt mobs            "  NV?                             Neuro Re-Ed                     Rhythmic stab                     Prone Ys, Ts, Is 2x10 Ys, Ts 2x10 Ys, Ts 2x10  Y's/T's 2x10  Y's/T's 2x10  Y's/T's           Scap squeezes   2x10 2x10               Prone ext   5''x30                 Median nerve glides     x10 2x10 5'' 2x10 5'' 2x10 5'' 2 sec; 2x10  2 sec; 2x10  2 sec; 2x10   Ulnar nerve glides     x10 Radial n. Tensioner 2x10 5'' Radial n. Glide  Radial n. Glide          Radial N glides with head turn to the left             2x10  2x10  2x10   Cerv retract/ext repeated     3x10 NV 3x10 3x10         SNAGs Cervical Extension             NV?       Seated thoracic ext     2x10 NV 2x10 2x10         Standing Thoracic Extension with hands behind head with elbows sliding up wall             2 sec; 10x  NV  NV   Bicep stretch using biodex             20 sec x 3   20 sec x 3  NV   Elbow Flexion stretch c small towel                     Seated thoracic extension stretch              NV?  2x10  2x10   Sideying Thoracic Rotation stretch             NV?       Cerivcal retraction     3x10          10x  10x   Cervical retraction         10x   Ther Ex                     Wrist ext/flex DB   2# 2x10 2# 2x10   2# 2x10 ea 2# 2x10 ea 2# 3x10 ea  3# 3x10 ea.  3# 3x10 ea   Elbow flex/ext                     Tricep ext                     Rows, Ext                      Wrist flex, ext 10''x3 15''x5     15''x5 15''x5 15 sec x 5 ea  Home      strengthening    R gripper 2x10       R gripper 2x10         No moneys                      UBE                     Supination stretch   10''x3 10\" x3   15''x3           Tricep stretch   3x30'' 3x30\"               Supination   2# sup/pron 2x10 2# sup/pron 2x10   2# sup/pron 2x10 2# sup/pron 2x10 3# 3x10 (supination and pronation)   3# 3x10 (supination and pronation)   3# 3x10 supination and pronation)    Ther Activity                                                                 Gait Training                   "                                               Modalities

## 2024-04-15 ENCOUNTER — OFFICE VISIT (OUTPATIENT)
Dept: PHYSICAL THERAPY | Facility: CLINIC | Age: 59
End: 2024-04-15
Payer: COMMERCIAL

## 2024-04-15 DIAGNOSIS — M25.521 RIGHT ELBOW PAIN: Primary | ICD-10-CM

## 2024-04-15 PROCEDURE — 97110 THERAPEUTIC EXERCISES: CPT | Performed by: PHYSICAL THERAPIST

## 2024-04-15 PROCEDURE — 97140 MANUAL THERAPY 1/> REGIONS: CPT | Performed by: PHYSICAL THERAPIST

## 2024-04-15 PROCEDURE — 97112 NEUROMUSCULAR REEDUCATION: CPT | Performed by: PHYSICAL THERAPIST

## 2024-04-15 NOTE — PROGRESS NOTES
Daily Note     Today's date: 4/15/2024  Patient name: Theresa Kim  : 1965  MRN: [id number]  Referring provider: Joselin Brumfield DO  Dx:   Encounter Diagnosis     ICD-10-CM    1. Right elbow pain  M25.521                      Subjective: Patient reports that she was sore after last visit however she did some yard work yesterday and is more sore today.       Objective: See treatment diary below      Assessment: Tolerated treatment well; initiated POC with MH to R elbow while performing there ex. Vcs provided on proper sequencing with exercises; added pec stretch today and resumed seated thoracic extension. MT performed after receiving consent from pt; she continues to have increased tone along extension mass and improves with TPR. Discussed plan of care with increasing  strength.    Patient demonstrated fatigue post treatment and would benefit from continued PT      Plan: Continue per plan of care.      Precautions: No past medical history on file. HTN        Access Code: [email or web address]  URL: [email or web address]  Date: 2024  Prepared by: Nancy Colby    Exercises  - Median Nerve Flossing - Tray  - 1 x daily - 7 x weekly - 2 sets - 10 reps  - Ulnar Nerve Flossing  - 1 x daily - 7 x weekly - 2 sets - 10 reps  - Seated Cervical Retraction and Extension  - 1 x daily - 7 x weekly - 3 sets - 10 reps  - Neck Retraction  - 1 x daily - 7 x weekly - 3 sets - 10 reps    Date 2/26 3/1 3/4 3/7 3/12 3/15         Visit # 1 2 3 4 5 6         FOTO done              Re-eval                     Manuals 4/15  3/4 3/7 3/12 3/15 4/4  4/8  4/12   PROM                   Tspine     P to A grade 5  (2x)             Elbow mobilization     FH and cervical mobe 3-4 FH FH SMF  ATS, pronation/supination mob, STM SMF with distraction, AP glides of RU jt   Taping Tricep       FH FH                             Tricep IASTM SMF  ksg     Cupping 3 min SMF    SMF   IASTM along extensor mass SMF               SMF  "  Active release of Extensor mass SMF (10x)        SMF (5x)   PA glides cervical            NV?       Cervical jt mobs           NV?                           Neuro Re-Ed                   Rhythmic stab                   Prone Ys, Ts, Is   2x10  Y's/T's 2x10  Y's/T's 2x10  Y's/T's           Scap squeezes   2x10               Prone ext                   Median nerve glides 2 sec; 2x10  x10 2x10 5'' 2x10 5'' 2x10 5'' 2 sec; 2x10  2 sec; 2x10  2 sec; 2x10   Ulnar nerve glides   x10 Radial n. Tensioner 2x10 5'' Radial n. Glide  Radial n. Glide          Radial N glides with head turn to the left 2x10          2x10  2x10  2x10   Cerv retract/ext repeated   3x10 NV 3x10 3x10         SNAGs Cervical Extension           NV?       Seated thoracic ext 2  2x10 NV 2x10 2x10         Standing Thoracic Extension with hands behind head with elbows sliding up wall           2 sec; 10x  NV  NV   Bicep stretch using biodex 20 sec x 3          20 sec x 3   20 sec x 3  NV   Pec stretch in doorway 20 sec x 3 ea           Elbow Flexion stretch c small towel                   Seated thoracic extension stretch  2x10 (using soccer ball)           NV?  2x10  2x10   Sideying Thoracic Rotation stretch           NV?       Cerivcal retraction 10x  3x10          10x  10x   Cervical retraction to exntesion 10x        10x   Ther Ex                   Wrist ext/flex DB 3# 3x10 ea  2# 2x10   2# 2x10 ea 2# 2x10 ea 2# 3x10 ea  3# 3x10 ea.  3# 3x10 ea   Elbow flex/ext                   Tricep ext                   Rows, Ext                    Wrist flex, ext       15''x5 15''x5 15 sec x 5 ea  Home      strengthening          R gripper 2x10         No moneys                    UBE                   Supination stretch   10\" x3   15''x3           Tricep stretch   3x30\"               Supination and pronation 3# 3x10  2# sup/pron 2x10   2# sup/pron 2x10 2# sup/pron 2x10 3# 3x10 (supination and pronation)   3# 3x10 (supination and pronation)   3# 3x10 " supination and pronation)    Ther Activity                                                           Gait Training                                                           Modalities                    MH 5 mins

## 2024-04-18 ENCOUNTER — OFFICE VISIT (OUTPATIENT)
Dept: PHYSICAL THERAPY | Facility: CLINIC | Age: 59
End: 2024-04-18
Payer: COMMERCIAL

## 2024-04-18 DIAGNOSIS — M25.521 RIGHT ELBOW PAIN: Primary | ICD-10-CM

## 2024-04-18 PROCEDURE — 97110 THERAPEUTIC EXERCISES: CPT | Performed by: PHYSICAL THERAPIST

## 2024-04-18 PROCEDURE — 97140 MANUAL THERAPY 1/> REGIONS: CPT | Performed by: PHYSICAL THERAPIST

## 2024-04-18 PROCEDURE — 97112 NEUROMUSCULAR REEDUCATION: CPT | Performed by: PHYSICAL THERAPIST

## 2024-04-18 NOTE — PROGRESS NOTES
Daily Note     Today's date: 2024  Patient name: Theresa Kim  : 1965  MRN: [id number]  Referring provider: Joselin Brumfield DO  Dx:   Encounter Diagnosis     ICD-10-CM    1. Right elbow pain  M25.521                      Subjective: Patient reports that she is not as sore today compared to last visit.       Objective: See treatment diary below      Assessment: Tolerated treatment well; initiated POC with MH to R elbow while performing there ex. Vcs provided on proper sequencing with exercises; added therastick and gripper to increase  strength. She reports having discomfort post session which improves post IASTM. She was instructed to perform self TPR f/b nerve gliding and static stretching due to reports of relief.  Patient demonstrated fatigue post treatment and would benefit from continued PT      Plan: Continue per plan of care.      Precautions: No past medical history on file. HTN        Access Code: [email or web address]  URL: [email or web address]  Date: 2024  Prepared by: Nancy Colby    Exercises  - Median Nerve Flossing - Tray  - 1 x daily - 7 x weekly - 2 sets - 10 reps  - Ulnar Nerve Flossing  - 1 x daily - 7 x weekly - 2 sets - 10 reps  - Seated Cervical Retraction and Extension  - 1 x daily - 7 x weekly - 3 sets - 10 reps  - Neck Retraction  - 1 x daily - 7 x weekly - 3 sets - 10 reps    Date 2/26 3/1 3/4 3/7 3/12 3/15         Visit # 1 2 3 4 5 6         FOTO done              Re-eval                     Manuals 4/15 4/18  3/7 3/12 3/15 4/4  4/8  4/12   PROM                  Tspine    P to A grade 5  (2x)             Elbow mobilization  SMF with distraction, AP glides of RU jt  FH and cervical mobe 3-4 FH FH SMF  ATS, pronation/supination mob, STM SMF with distraction, AP glides of RU jt   Taping Tricep      FH FH                            Tricep IASTM SMF SMF      Cupping 3 min SMF    SMF   IASTM along extensor mass SMF SMF             SMF   Active release of  Extensor mass SMF (10x) SMF (10x)        SMF (5x)   PA glides cervical           NV?       Cervical jt mobs          NV?                          Neuro Re-Ed                  Rhythmic stab                  Prone Ys, Ts, Is    2x10  Y's/T's 2x10  Y's/T's           Scap squeezes                  Prone ext                  Median nerve glides 2 sec; 2x10 2 sec; 2x10  2x10 5'' 2x10 5'' 2x10 5'' 2 sec; 2x10  2 sec; 2x10  2 sec; 2x10   Ulnar nerve glides    Radial n. Tensioner 2x10 5'' Radial n. Glide  Radial n. Glide          Radial N glides with head turn to the left 2x10 2x10        2x10  2x10  2x10   Cerv retract/ext repeated    NV 3x10 3x10         SNAGs Cervical Extension          NV?       Seated thoracic ext 2   NV 2x10 2x10         Standing Thoracic Extension with hands behind head with elbows sliding up wall          2 sec; 10x  NV  NV   Bicep stretch using biodex 20 sec x 3 20 sec x 3        20 sec x 3   20 sec x 3  NV   Pec stretch in doorway 20 sec x 3 ea 20 sec x 3 ea          Elbow Flexion stretch c small towel                  Seated thoracic extension stretch  2x10 (using soccer ball)  2x10 (using soccer ball)        NV?  2x10  2x10   Sideying Thoracic Rotation stretch          NV?       Cerivcal retraction 10x            10x  10x   Cervical retraction to exntesion 10x 10x       10x   Ther Ex                  Wrist ext/flex DB 3# 3x10 ea 3# 3x10 ea    2# 2x10 ea 2# 2x10 ea 2# 3x10 ea  3# 3x10 ea.  3# 3x10 ea   Diggiflex (all, individual)  Yellow: 10 (all); 10 (individual)          Elbow flex/ext                  Tricep ext                  Rows, Ext                   Wrist flex, ext      15''x5 15''x5 15 sec x 5 ea  Home      strengthening         R gripper 2x10         No moneys                   UBE                  Supination stretch      15''x3           Tricep stretch                  Therastick wrist flexion and extension eccentric  Red: 10 ea          Supination and pronation 3# 3x10 3# 3x10     2# sup/pron 2x10 2# sup/pron 2x10 3# 3x10 (supination and pronation)   3# 3x10 (supination and pronation)   3# 3x10 supination and pronation)    Ther Activity                                                        Gait Training                                                        Modalities                   MH 5 mins 5 mins

## 2024-04-22 ENCOUNTER — OFFICE VISIT (OUTPATIENT)
Dept: PHYSICAL THERAPY | Facility: CLINIC | Age: 59
End: 2024-04-22
Payer: COMMERCIAL

## 2024-04-22 DIAGNOSIS — M25.521 RIGHT ELBOW PAIN: Primary | ICD-10-CM

## 2024-04-22 PROCEDURE — 97112 NEUROMUSCULAR REEDUCATION: CPT | Performed by: PHYSICAL THERAPIST

## 2024-04-22 PROCEDURE — 97140 MANUAL THERAPY 1/> REGIONS: CPT | Performed by: PHYSICAL THERAPIST

## 2024-04-22 PROCEDURE — 97110 THERAPEUTIC EXERCISES: CPT | Performed by: PHYSICAL THERAPIST

## 2024-04-22 NOTE — PROGRESS NOTES
Daily Note     Today's date: 2024  Patient name: Theresa Kim  : 1965  MRN: [id number]  Referring provider: Joselin Brumfield DO  Dx:   Encounter Diagnosis     ICD-10-CM    1. Right elbow pain  M25.521                      Subjective: Patient reports that she was sore after last time and needed to take OTC meds which she normally does not have to do. She feels pretty good this morning.       Objective: See treatment diary below      Assessment: Tolerated treatment well; initiated POC with MH to R elbow while performing there ex. Vcs provided on proper sequencing with exercises; she has tightness with elbow flexion and therapist showed self distraction with elbow flexion stretch with using towel roll. She had relief post session.   Patient demonstrated fatigue post treatment and would benefit from continued PT      Plan: Continue per plan of care.      Precautions: No past medical history on file. HTN        Access Code: [email or web address]  URL: [email or web address]  Date: 2024  Prepared by: Nancy Colby    Exercises  - Median Nerve Flossing - Tray  - 1 x daily - 7 x weekly - 2 sets - 10 reps  - Ulnar Nerve Flossing  - 1 x daily - 7 x weekly - 2 sets - 10 reps  - Seated Cervical Retraction and Extension  - 1 x daily - 7 x weekly - 3 sets - 10 reps  - Neck Retraction  - 1 x daily - 7 x weekly - 3 sets - 10 reps    Date 2/26 3/1 3/4 3/7 3/12 3/15         Visit # 1 2 3 4 5 6         FOTO done              Re-eval                     Manuals 4/15 4/18 4/22   3/15 4/4  4/8  4/12   PROM                Tspine                Elbow mobilization  SMF with distraction, AP glides of RU jt SMF with distraction, AP glides of RU jt; elbow flexion with distraction   FH SMF  ATS, pronation/supination mob, STM SMF with distraction, AP glides of RU jt   Taping Tricep      FH                          Tricep IASTM SMF SMF SMF   Cupping 3 min SMF    SMF   IASTM along extensor mass SMF SMF SMF          SMF    Active release of Extensor mass SMF (10x) SMF (10x)  NV      SMF (5x)   PA glides cervical         NV?       Cervical jt mobs        NV?                        Neuro Re-Ed                Rhythmic stab                Prone Ys, Ts, Is                Scap squeezes                Prone ext                Median nerve glides 2 sec; 2x10 2 sec; 2x10 2 sec; 2x10   2x10 5'' 2 sec; 2x10  2 sec; 2x10  2 sec; 2x10   Ulnar nerve glides      Radial n. Glide          Radial N glides with head turn to the left 2x10 2x10 2x10     2x10  2x10  2x10   Cerv retract/ext repeated      3x10         SNAGs Cervical Extension        NV?       Seated thoracic ext 2     2x10         Standing Thoracic Extension with hands behind head with elbows sliding up wall        2 sec; 10x  NV  NV   Bicep stretch using biodex 20 sec x 3 20 sec x 3 20 sec x 3     20 sec x 3   20 sec x 3  NV   Pec stretch in doorway 20 sec x 3 ea 20 sec x 3 ea 20 sec x 3 ea         Elbow Flexion stretch c small towel                Seated thoracic extension stretch  2x10 (using soccer ball)  2x10 (using soccer ball) 2x10 (using soccer ball)      NV?  2x10  2x10   Sideying Thoracic Rotation stretch        NV?       Cerivcal retraction 10x          10x  10x   Cervical retraction to exntesion 10x 10x 10x      10x   Ther Ex                Wrist ext/flex DB 3# 3x10 ea 3# 3x10 ea 3# 30 ea   2# 2x10 ea 2# 3x10 ea  3# 3x10 ea.  3# 3x10 ea   Diggiflex (all, individual)  Yellow: 10 (all); 10 (individual) Yellow (all): 10; 5 (individual)          Elbow flex/ext                Tricep ext                Rows, Ext                 Wrist flex, ext      15''x5 15 sec x 5 ea  Home      strengthening       R gripper 2x10         No moneys                 UBE                Supination stretch                Tricep stretch                Therastick wrist flexion and extension eccentric  Red: 10 ea NV         Supination and pronation 3# 3x10 3# 3x10 3# 30   2# sup/pron 2x10 3# 3x10  (supination and pronation)   3# 3x10 (supination and pronation)   3# 3x10 supination and pronation)    Ther Activity                                                  Gait Training                                                  Modalities                 MH 5 mins 5 mins 5 mins

## 2024-04-25 ENCOUNTER — OFFICE VISIT (OUTPATIENT)
Dept: PHYSICAL THERAPY | Facility: CLINIC | Age: 59
End: 2024-04-25
Payer: COMMERCIAL

## 2024-04-25 DIAGNOSIS — M25.521 RIGHT ELBOW PAIN: Primary | ICD-10-CM

## 2024-04-25 PROCEDURE — 97110 THERAPEUTIC EXERCISES: CPT | Performed by: PHYSICAL THERAPIST

## 2024-04-25 PROCEDURE — 97112 NEUROMUSCULAR REEDUCATION: CPT | Performed by: PHYSICAL THERAPIST

## 2024-04-25 PROCEDURE — 97140 MANUAL THERAPY 1/> REGIONS: CPT | Performed by: PHYSICAL THERAPIST

## 2024-04-25 NOTE — PROGRESS NOTES
Daily Note     Today's date: 2024  Patient name: Theresa Kim  : 1965  MRN: [id number]  Referring provider: Joselin Brumfield DO  Dx:   Encounter Diagnosis     ICD-10-CM    1. Right elbow pain  M25.521                      Subjective: Patient reports that she was sore after last visit and needed to take OTC medication.       Objective: See treatment diary below      Assessment: Tolerated treatment well; initiated POC MH while performing there ex. Vcs provided on proper sequencing with exercises. MT performed after receiving consent from pt; she demonstrates increased tone to dorsal aspect of forearm and thus trialed cupping. She reports one area of discomfort but pain improved after the cup had been removed. Therapist discussed proper responses to cupping.  Patient demonstrated fatigue post treatment and would benefit from continued PT      Plan: Continue per plan of care.      Precautions: No past medical history on file. HTN        Access Code: [email or web address]  URL: [email or web address]  Date: 2024  Prepared by: Nancy Colby    Exercises  - Median Nerve Flossing - Tray  - 1 x daily - 7 x weekly - 2 sets - 10 reps  - Ulnar Nerve Flossing  - 1 x daily - 7 x weekly - 2 sets - 10 reps  - Seated Cervical Retraction and Extension  - 1 x daily - 7 x weekly - 3 sets - 10 reps  - Neck Retraction  - 1 x daily - 7 x weekly - 3 sets - 10 reps    Date 2/26 3/1 3/4 3/7 3/12 3/15         Visit # 1 2 3 4 5 6         FOTO done              Re-eval                     Manuals 4/15 4/18 4/22 4/25  3/15 4/4  4/8  4/12   PROM                Tspine                Elbow mobilization  SMF with distraction, AP glides of RU jt SMF with distraction, AP glides of RU jt; elbow flexion with distraction SMF with distraction, AP glides of RU jt, elbow flexion with distraction  FH SMF  ATS, pronation/supination mob, STM SMF with distraction, AP glides of RU jt   Taping Tricep      FH                           Tricep IASTM SMF SMF SMF SMF  Cupping 3 min SMF    SMF   IASTM along extensor mass SMF SMF SMF SMF         SMF   Active release of Extensor mass SMF (10x) SMF (10x)  NV SMF 10x     SMF (5x)   Cupping    SMF         PA glides cervical         NV?       Cervical jt mobs        NV?                        Neuro Re-Ed                Rhythmic stab                Prone Ys, Ts, Is                Scap squeezes                Prone ext                Median nerve glides 2 sec; 2x10 2 sec; 2x10 2 sec; 2x10 2 sec; 2x10  2x10 5'' 2 sec; 2x10  2 sec; 2x10  2 sec; 2x10   Ulnar nerve glides      Radial n. Glide          Radial N glides with head turn to the left 2x10 2x10 2x10 2x10    2x10  2x10  2x10   Cerv retract/ext repeated      3x10         SNAGs Cervical Extension        NV?       Seated thoracic ext 2     2x10         Standing Thoracic Extension with hands behind head with elbows sliding up wall        2 sec; 10x  NV  NV   Bicep stretch using biodex 20 sec x 3 20 sec x 3 20 sec x 3 NV    20 sec x 3   20 sec x 3  NV   Pec stretch in doorway 20 sec x 3 ea 20 sec x 3 ea 20 sec x 3 ea NV        Elbow Flexion stretch c small towel                Seated thoracic extension stretch  (soccer ball)  2x10 (using soccer ball)  2x10 (using soccer ball) 2x10 (using soccer ball)  2x10 (hands clasped and second round with hands behind head)    NV?  2x10  2x10   Sideying Thoracic Rotation stretch        NV?       Cerivcal retraction 10x          10x  10x   Cervical retraction to exntesion 10x 10x 10x      10x   Ther Ex                Wrist ext/flex DB 3# 3x10 ea 3# 3x10 ea 3# 30 ea 3# 30 ea  2# 2x10 ea 2# 3x10 ea  3# 3x10 ea.  3# 3x10 ea   Diggiflex (all, individual)  Yellow: 10 (all); 10 (individual) Yellow (all): 10; 5 (individual)  Yellow (all): 10; 5 (individual)         Elbow flex/ext                Tricep ext                Rows, Ext                 Wrist flex, ext      15''x5 15 sec x 5 ea  Home      strengthening       R  gripper 2x10         No moneys                 UBE                Supination stretch                Tricep stretch                Therastick wrist flexion and extension eccentric  Red: 10 ea NV         Supination and pronation 3# 3x10 3# 3x10 3# 30 3# 30  2# sup/pron 2x10 3# 3x10 (supination and pronation)   3# 3x10 (supination and pronation)   3# 3x10 supination and pronation)    Ther Activity                                                  Gait Training                                                  Modalities                 MH 5 mins 5 mins 5 mins 5 mins

## 2024-04-30 ENCOUNTER — OFFICE VISIT (OUTPATIENT)
Dept: PHYSICAL THERAPY | Facility: CLINIC | Age: 59
End: 2024-04-30
Payer: COMMERCIAL

## 2024-04-30 DIAGNOSIS — M25.521 RIGHT ELBOW PAIN: Primary | ICD-10-CM

## 2024-04-30 PROCEDURE — 97112 NEUROMUSCULAR REEDUCATION: CPT | Performed by: PHYSICAL THERAPIST

## 2024-04-30 PROCEDURE — 97110 THERAPEUTIC EXERCISES: CPT | Performed by: PHYSICAL THERAPIST

## 2024-04-30 PROCEDURE — 97140 MANUAL THERAPY 1/> REGIONS: CPT | Performed by: PHYSICAL THERAPIST

## 2024-04-30 NOTE — PROGRESS NOTES
Daily Note     Today's date: 2024  Patient name: Theresa Kim  : 1965  MRN: [id number]  Referring provider: Joselin Brumfield DO  Dx:   Encounter Diagnosis     ICD-10-CM    1. Right elbow pain  M25.521                      Subjective: Patient reports that she is still tight on the lateral dorsal aspect of arm.       Objective: See treatment diary below      Assessment: Tolerated treatment well; initiated POC with MH to R elbow while performing therex. Vcs provided on proper sequencing with exercises; resumed bicep stretching. MT performed after receiving consent from pt; resumed cupping and performed gliding along extensor mass.   Patient demonstrated fatigue post treatment and would benefit from continued PT      Plan: Continue per plan of care.      Precautions: No past medical history on file. HTN        Access Code: [email or web address]  URL: [email or web address]  Date: 2024  Prepared by: Nancy Colby    Exercises  - Median Nerve Flossing - Tray  - 1 x daily - 7 x weekly - 2 sets - 10 reps  - Ulnar Nerve Flossing  - 1 x daily - 7 x weekly - 2 sets - 10 reps  - Seated Cervical Retraction and Extension  - 1 x daily - 7 x weekly - 3 sets - 10 reps  - Neck Retraction  - 1 x daily - 7 x weekly - 3 sets - 10 reps    Date 2/26 3/1 3/4 3/7 3/12 3/15         Visit # 1 2 3 4 5 6         FOTO done              Re-eval                     Manuals 4/15 4/18 4/22 4/25 4/30  4/4  4/8  4/12   PROM               Tspine               Elbow mobilization  SMF with distraction, AP glides of RU jt SMF with distraction, AP glides of RU jt; elbow flexion with distraction SMF with distraction, AP glides of RU jt, elbow flexion with distraction SMF with distraction, AP glides of RU jt, elbow flexion with distraction  SMF  ATS, pronation/supination mob, STM SMF with distraction, AP glides of RU jt   Taping Tricep                               Tricep IASTM SMF SMF SMF SMF   SMF    SMF   IASTM along extensor  mass SMF SMF SMF SMF SMF       SMF   Active release of Extensor mass SMF (10x) SMF (10x)  NV SMF 10x SMF 10x    SMF (5x)   Cupping    SMF  SMF along extensor mass (3 cups for 2 minutes f/b sliding)       PA glides cervical        NV?       Cervical jt mobs       NV?                       Neuro Re-Ed               Rhythmic stab               Prone Ys, Ts, Is               Scap squeezes               Prone ext               Median nerve glides 2 sec; 2x10 2 sec; 2x10 2 sec; 2x10 2 sec; 2x10 2 sec; 2x10  2 sec; 2x10  2 sec; 2x10  2 sec; 2x10   Ulnar nerve glides               Radial N glides with head turn to the left 2x10 2x10 2x10 2x10 2x10  2x10  2x10  2x10   Cerv retract/ext repeated               SNAGs Cervical Extension       NV?       Seated thoracic ext 2              Standing Thoracic Extension with hands behind head with elbows sliding up wall       2 sec; 10x  NV  NV   Bicep stretch using biodex 20 sec x 3 20 sec x 3 20 sec x 3 NV 10 sec x 3  20 sec x 3   20 sec x 3  NV   Pec stretch in doorway 20 sec x 3 ea 20 sec x 3 ea 20 sec x 3 ea NV NV?       Elbow Flexion stretch c small towel               Seated thoracic extension stretch  (soccer ball)  2x10 (using soccer ball)  2x10 (using soccer ball) 2x10 (using soccer ball)  2x10 (hands clasped and second round with hands behind head) 2x10 (hands clasped and second round with hands behind head)  NV?  2x10  2x10   Sideying Thoracic Rotation stretch       NV?       Cerivcal retraction 10x         10x  10x   Cervical retraction to exntesion 10x 10x 10x      10x   Ther Ex               Wrist ext/flex DB 3# 3x10 ea 3# 3x10 ea 3# 30 ea 3# 30 ea 3# 30 ea  2# 3x10 ea  3# 3x10 ea.  3# 3x10 ea   Diggiflex (all, individual)  Yellow: 10 (all); 10 (individual) Yellow (all): 10; 5 (individual)  Yellow (all): 10; 5 (individual)  Yellow (all): 10; 5 (individual)       Elbow flex/ext               Tricep ext               Rows, Ext                Wrist flex, ext       15 sec x  5 ea  Home      strengthening                No moneys                UBE               Supination stretch               Tricep stretch               Therastick wrist flexion and extension eccentric  Red: 10 ea NV         Supination and pronation 3# 3x10 3# 3x10 3# 30 3# 30 3# 30  3# 3x10 (supination and pronation)   3# 3x10 (supination and pronation)   3# 3x10 supination and pronation)    Ther Activity                                               Gait Training                                               Modalities                MH 5 mins 5 mins 5 mins 5 mins 5 mins

## 2024-05-03 ENCOUNTER — OFFICE VISIT (OUTPATIENT)
Dept: PHYSICAL THERAPY | Facility: CLINIC | Age: 59
End: 2024-05-03
Payer: COMMERCIAL

## 2024-05-03 DIAGNOSIS — M25.521 RIGHT ELBOW PAIN: Primary | ICD-10-CM

## 2024-05-03 PROCEDURE — 97112 NEUROMUSCULAR REEDUCATION: CPT | Performed by: PHYSICAL THERAPIST

## 2024-05-03 PROCEDURE — 97110 THERAPEUTIC EXERCISES: CPT | Performed by: PHYSICAL THERAPIST

## 2024-05-03 PROCEDURE — 97140 MANUAL THERAPY 1/> REGIONS: CPT | Performed by: PHYSICAL THERAPIST

## 2024-05-03 NOTE — PROGRESS NOTES
Daily Note     Today's date: 5/3/2024  Patient name: Theresa Kim  : 1965  MRN: [id number]  Referring provider: Joselin Brumfield DO  Dx:   Encounter Diagnosis     ICD-10-CM    1. Right elbow pain  M25.521                      Subjective: Patient reports that she did feel better after last visit.       Objective: See treatment diary below      Assessment: Tolerated treatment well; initiated POC with MH while performing there ex. MT performed after receiving consent from pt; she has increased tone on medial aspect of extensor mass which improves post TPR/IASTM. She denies pain with MT however soreness post session. She was instructed to use modalities at home pending sxs.  Patient demonstrated fatigue post treatment and would benefit from continued PT      Plan: Continue per plan of care.      Precautions: No past medical history on file. HTN        Access Code: [email or web address]  URL: [email or web address]  Date: 2024  Prepared by: Nancy Colby    Exercises  - Median Nerve Flossing - Tray  - 1 x daily - 7 x weekly - 2 sets - 10 reps  - Ulnar Nerve Flossing  - 1 x daily - 7 x weekly - 2 sets - 10 reps  - Seated Cervical Retraction and Extension  - 1 x daily - 7 x weekly - 3 sets - 10 reps  - Neck Retraction  - 1 x daily - 7 x weekly - 3 sets - 10 reps    Date 2/26 3/1 3/4 3/7 3/12 3/15         Visit # 1 2 3 4 5 6         FOTO done              Re-eval                     Manuals 4/15 4/18 4/22 4/25 4/30 5/3   4/8  4/12   PROM              Tspine              Elbow mobilization  SMF with distraction, AP glides of RU jt SMF with distraction, AP glides of RU jt; elbow flexion with distraction SMF with distraction, AP glides of RU jt, elbow flexion with distraction SMF with distraction, AP glides of RU jt, elbow flexion with distraction SMF with distraction, AP glides of RU jt,    ATS, pronation/supination mob, STM SMF with distraction, AP glides of RU jt   Taping Tricep                              Tricep IASTM SMF SMF SMF SMF       SMF   IASTM along extensor mass SMF SMF SMF SMF SMF SMF     SMF   Active release of Extensor mass SMF (10x) SMF (10x)  NV SMF 10x SMF 10x SMF 10x   SMF (5x)   Cupping    SMF  SMF along extensor mass (3 cups for 2 minutes f/b sliding) SMF along extensor mass (3 cups for 2 mins f/b sliding)      PA glides cervical               Cervical jt mobs                             Neuro Re-Ed              Rhythmic stab              Prone Ys, Ts, Is              Scap squeezes              Prone ext              Median nerve glides 2 sec; 2x10 2 sec; 2x10 2 sec; 2x10 2 sec; 2x10 2 sec; 2x10 2 sec; 2x10   2 sec; 2x10  2 sec; 2x10   Ulnar nerve glides              Radial N glides with head turn to the left 2x10 2x10 2x10 2x10 2x10 2x10   2x10  2x10   Cerv retract/ext repeated              SNAGs Cervical Extension              Seated thoracic ext 2             Standing Thoracic Extension with hands behind head with elbows sliding up wall         NV  NV   Bicep stretch using biodex 20 sec x 3 20 sec x 3 20 sec x 3 NV 10 sec x 3 10 sec x 3   20 sec x 3  NV   Pec stretch in doorway 20 sec x 3 ea 20 sec x 3 ea 20 sec x 3 ea NV NV?       Elbow Flexion stretch c small towel              Seated thoracic extension stretch  (soccer ball)  2x10 (using soccer ball)  2x10 (using soccer ball) 2x10 (using soccer ball)  2x10 (hands clasped and second round with hands behind head) 2x10 (hands clasped and second round with hands behind head) 2x10 (hands clapsed and second round with bands behind head)   2x10  2x10   Sideying Thoracic Rotation stretch              Cerivcal retraction 10x        10x  10x   Cervical retraction to exntesion 10x 10x 10x      10x   Ther Ex              Wrist ext/flex DB 3# 3x10 ea 3# 3x10 ea 3# 30 ea 3# 30 ea 3# 30 ea 3# 30 ea   3# 3x10 ea.  3# 3x10 ea   Diggiflex (all, individual)  Yellow: 10 (all); 10 (individual) Yellow (all): 10; 5 (individual)  Yellow (all): 10; 5  (individual)  Yellow (all): 10; 5 (individual) Yellow (all): 10; 5 (individual)      Elbow flex/ext              Tricep ext              Rows, Ext               Wrist flex, ext         Home      strengthening               No moneys               UBE              Supination stretch              Tricep stretch              Therastick wrist flexion and extension eccentric  Red: 10 ea NV         Supination and pronation 3# 3x10 3# 3x10 3# 30 3# 30 3# 30 3# 30   3# 3x10 (supination and pronation)   3# 3x10 supination and pronation)    Ther Activity                                            Gait Training                                            Modalities               MH 5 mins 5 mins 5 mins 5 mins 5 mins 5 mins

## 2024-05-06 ENCOUNTER — OFFICE VISIT (OUTPATIENT)
Dept: PHYSICAL THERAPY | Facility: CLINIC | Age: 59
End: 2024-05-06
Payer: COMMERCIAL

## 2024-05-06 DIAGNOSIS — M25.521 RIGHT ELBOW PAIN: Primary | ICD-10-CM

## 2024-05-06 PROCEDURE — 97110 THERAPEUTIC EXERCISES: CPT | Performed by: PHYSICAL THERAPIST

## 2024-05-06 PROCEDURE — 97140 MANUAL THERAPY 1/> REGIONS: CPT | Performed by: PHYSICAL THERAPIST

## 2024-05-06 PROCEDURE — 97112 NEUROMUSCULAR REEDUCATION: CPT | Performed by: PHYSICAL THERAPIST

## 2024-05-06 NOTE — PROGRESS NOTES
Daily Note     Today's date: 2024  Patient name: Theresa Kim  : 1965  MRN: [id number]  Referring provider: Joselin Brumfield DO  Dx:   Encounter Diagnosis     ICD-10-CM    1. Right elbow pain  M25.521                      Subjective: patient reports that she was pretty sore after last visit.       Objective: See treatment diary below      Assessment: Tolerated treatment well; initiated POC with MH to R elbow while performing there ex. Vcs provided on proper sequencing with exercises; added elbow flexion with supination. She reports having some weakness during there ex. Added therabar shakes. MT performed after receiving consent from pt; IASTM to extensor mass.   Patient demonstrated fatigue post treatment and would benefit from continued PT      Plan: Continue per plan of care.      Precautions: No past medical history on file. HTN        Access Code: [email or web address]  URL: [email or web address]  Date: 2024  Prepared by: Nancy Colby    Exercises  - Median Nerve Flossing - Tray  - 1 x daily - 7 x weekly - 2 sets - 10 reps  - Ulnar Nerve Flossing  - 1 x daily - 7 x weekly - 2 sets - 10 reps  - Seated Cervical Retraction and Extension  - 1 x daily - 7 x weekly - 3 sets - 10 reps  - Neck Retraction  - 1 x daily - 7 x weekly - 3 sets - 10 reps    Date 2/26 3/1 3/4 3/7 3/12 3/15         Visit # 1 2 3 4 5 6         FOTO done              Re-eval                     Manuals 4/15 4/18 4/22 4/25 4/30 5/3 5/6    PROM           Tspine           Elbow mobilization  SMF with distraction, AP glides of RU jt SMF with distraction, AP glides of RU jt; elbow flexion with distraction SMF with distraction, AP glides of RU jt, elbow flexion with distraction SMF with distraction, AP glides of RU jt, elbow flexion with distraction SMF with distraction, AP glides of RU jt,  SMF with distraction, AP glides of RU jt    Taping Tricep                       Tricep IASTM SMF SMF SMF SMF       IASTM along  extensor mass SMF SMF SMF SMF SMF SMF SMF    Active release of Extensor mass SMF (10x) SMF (10x)  NV SMF 10x SMF 10x SMF 10x SMF 10x    Cupping    SMF  SMF along extensor mass (3 cups for 2 minutes f/b sliding) SMF along extensor mass (3 cups for 2 mins f/b sliding) SMF along extensor mass (3 cups for 2 mins f/b sliding)     PA glides cervical            Cervical jt mobs                       Neuro Re-Ed           Rhythmic stab           Prone Ys, Ts, Is           Scap squeezes           Prone ext           Median nerve glides 2 sec; 2x10 2 sec; 2x10 2 sec; 2x10 2 sec; 2x10 2 sec; 2x10 2 sec; 2x10 2 sec; 2x10    Ulnar nerve glides           Radial N glides with head turn to the left 2x10 2x10 2x10 2x10 2x10 2x10 2x10    Cerv retract/ext repeated           SNAGs Cervical Extension           Seated thoracic ext 2          Standing Thoracic Extension with hands behind head with elbows sliding up wall           Bicep stretch using biodex 20 sec x 3 20 sec x 3 20 sec x 3 NV 10 sec x 3 10 sec x 3 15 sec x 3    Pec stretch in doorway 20 sec x 3 ea 20 sec x 3 ea 20 sec x 3 ea NV NV?      Elbow Flexion stretch c small towel           Seated thoracic extension stretch  (soccer ball)  2x10 (using soccer ball)  2x10 (using soccer ball) 2x10 (using soccer ball)  2x10 (hands clasped and second round with hands behind head) 2x10 (hands clasped and second round with hands behind head) 2x10 (hands clapsed and second round with bands behind head) 2x10 (hands clapsed and second round with bands behind head)     Sideying Thoracic Rotation stretch           Cerivcal retraction 10x          Cervical retraction to exntesion 10x 10x 10x        Ther Ex           Wrist ext/flex DB 3# 3x10 ea 3# 3x10 ea 3# 30 ea 3# 30 ea 3# 30 ea 3# 30 ea 3# 30 ea    Diggiflex (all, individual)  Yellow: 10 (all); 10 (individual) Yellow (all): 10; 5 (individual)  Yellow (all): 10; 5 (individual)  Yellow (all): 10; 5 (individual) Yellow (all): 10; 5  (individual) Yellow (all): 20; 8 (individual)     Pronation with supination during elbow flexion       2# 10, 3# 10, 4# 10                          Tricep ext           Rows, Ext            Wrist flex, ext            strengthening            No moneys            UBE           Supination stretch           Tricep stretch           Therastick wrist flexion and extension eccentric  Red: 10 ea NV    Red: 2 sec x 10 ea    Therastick shakes AP, M/L       Red: 30 sec x 1 ea     Supination and pronation 3# 3x10 3# 3x10 3# 30 3# 30 3# 30 3# 30 3# 30    Ther Activity                                   Gait Training                                   Modalities            MH 5 mins 5 mins 5 mins 5 mins 5 mins 5 mins 5 mins

## 2024-05-09 ENCOUNTER — OFFICE VISIT (OUTPATIENT)
Dept: PHYSICAL THERAPY | Facility: CLINIC | Age: 59
End: 2024-05-09
Payer: COMMERCIAL

## 2024-05-09 DIAGNOSIS — M25.521 RIGHT ELBOW PAIN: Primary | ICD-10-CM

## 2024-05-09 PROCEDURE — 97112 NEUROMUSCULAR REEDUCATION: CPT | Performed by: PHYSICAL THERAPIST

## 2024-05-09 PROCEDURE — 97140 MANUAL THERAPY 1/> REGIONS: CPT | Performed by: PHYSICAL THERAPIST

## 2024-05-09 PROCEDURE — 97110 THERAPEUTIC EXERCISES: CPT | Performed by: PHYSICAL THERAPIST

## 2024-05-09 NOTE — PROGRESS NOTES
Daily Note     Today's date: 2024  Patient name: Theresa Kim  : 1965  MRN: [id number]  Referring provider: Joselin Brumfield DO  Dx:   Encounter Diagnosis     ICD-10-CM    1. Right elbow pain  M25.521                      Subjective: patient reports that she is sore however she feels as though she is improving. She reports that she was able to carry two gallon watering containers.       Objective: See treatment diary below      Assessment: Tolerated treatment well; initiated POC with MH while performing there ex. Vcs provided on proper sequencing with exercises; performed thera-stick shaking with straight arm to increase challenge and progress NM control. MT performed after receiving consent from pt; she continues to report tenderness in the extensor mass more medially today which improves post MT however increased soreness.    Patient demonstrated fatigue post treatment and would benefit from continued PT      Plan: Continue per plan of care.      Precautions: No past medical history on file. HTN        Access Code: [email or web address]  URL: [email or web address]  Date: 2024  Prepared by: Nancy Colby    Exercises  - Median Nerve Flossing - Tray  - 1 x daily - 7 x weekly - 2 sets - 10 reps  - Ulnar Nerve Flossing  - 1 x daily - 7 x weekly - 2 sets - 10 reps  - Seated Cervical Retraction and Extension  - 1 x daily - 7 x weekly - 3 sets - 10 reps  - Neck Retraction  - 1 x daily - 7 x weekly - 3 sets - 10 reps    Date 2/26 3/1 3/4 3/7 3/12 3/15         Visit # 1 2 3 4 5 6         FOTO done              Re-eval                     Manuals 4/15 4/18 4/22 4/25 4/30 5/3 5/6 5/9   PROM           Tspine           Elbow mobilization  SMF with distraction, AP glides of RU jt SMF with distraction, AP glides of RU jt; elbow flexion with distraction SMF with distraction, AP glides of RU jt, elbow flexion with distraction SMF with distraction, AP glides of RU jt, elbow flexion with distraction SMF  with distraction, AP glides of RU jt,  SMF with distraction, AP glides of RU jt SMF with distraction, AP glides of RU jt   Taping Tricep                       Tricep IASTM SMF SMF SMF SMF       IASTM along extensor mass SMF SMF SMF SMF SMF SMF SMF SMF   Active release of Extensor mass SMF (10x) SMF (10x)  NV SMF 10x SMF 10x SMF 10x SMF 10x SMF 10x   Cupping    SMF  SMF along extensor mass (3 cups for 2 minutes f/b sliding) SMF along extensor mass (3 cups for 2 mins f/b sliding) SMF along extensor mass (3 cups for 2 mins f/b sliding)  SMF along extensor mass (3 cups for 2 mins f/b siding)   PA glides cervical            Cervical jt mobs                       Neuro Re-Ed           Rhythmic stab           Prone Ys, Ts, Is           Scap squeezes           Prone ext           Median nerve glides 2 sec; 2x10 2 sec; 2x10 2 sec; 2x10 2 sec; 2x10 2 sec; 2x10 2 sec; 2x10 2 sec; 2x10 2 sec; 2x10   Ulnar nerve glides           Radial N glides with head turn to the left 2x10 2x10 2x10 2x10 2x10 2x10 2x10 2x10   Cerv retract/ext repeated           SNAGs Cervical Extension           Seated thoracic ext 2          Standing Thoracic Extension with hands behind head with elbows sliding up wall           Bicep stretch using biodex 20 sec x 3 20 sec x 3 20 sec x 3 NV 10 sec x 3 10 sec x 3 15 sec x 3 15 sec x 3   Pec stretch in doorway 20 sec x 3 ea 20 sec x 3 ea 20 sec x 3 ea NV NV?      Elbow Flexion stretch c small towel           Seated thoracic extension stretch  (soccer ball)  2x10 (using soccer ball)  2x10 (using soccer ball) 2x10 (using soccer ball)  2x10 (hands clasped and second round with hands behind head) 2x10 (hands clasped and second round with hands behind head) 2x10 (hands clapsed and second round with bands behind head) 2x10 (hands clapsed and second round with bands behind head)  2x10 (hands clasped and second round with hands behind head)   Sideying Thoracic Rotation stretch           Cerivcal retraction 10x           Cervical retraction to exntesion 10x 10x 10x        Ther Ex           Wrist ext/flex DB 3# 3x10 ea 3# 3x10 ea 3# 30 ea 3# 30 ea 3# 30 ea 3# 30 ea 3# 30 ea 3# 30 ea   Diggiflex (all, individual)  Yellow: 10 (all); 10 (individual) Yellow (all): 10; 5 (individual)  Yellow (all): 10; 5 (individual)  Yellow (all): 10; 5 (individual) Yellow (all): 10; 5 (individual) Yellow (all): 20; 8 (individual)  Yellow (all): 20; 8 (individual)    Pronation with supination during elbow flexion       2# 10, 3# 10, 4# 10 3# 10; 4# 2x10                         Tricep ext           Rows, Ext            Wrist flex, ext            strengthening            No moneys            UBE           Supination stretch           Tricep stretch           Therastick wrist flexion and extension eccentric  Red: 10 ea NV    Red: 2 sec x 10 ea Red: 5 sec x 10 ea   Therastick shakes AP, M/L       Red: 30 sec x 1 ea  Red: 30 sec x 1 ea (AP straight arm, M/L bent elbow)    Supination and pronation 3# 3x10 3# 3x10 3# 30 3# 30 3# 30 3# 30 3# 30 3# 30   Ther Activity                                   Gait Training                                   Modalities            MH 5 mins 5 mins 5 mins 5 mins 5 mins 5 mins 5 mins 5 mins

## 2024-05-13 ENCOUNTER — OFFICE VISIT (OUTPATIENT)
Dept: PHYSICAL THERAPY | Facility: CLINIC | Age: 59
End: 2024-05-13
Payer: COMMERCIAL

## 2024-05-13 DIAGNOSIS — M25.521 RIGHT ELBOW PAIN: Primary | ICD-10-CM

## 2024-05-13 PROCEDURE — 97110 THERAPEUTIC EXERCISES: CPT | Performed by: PHYSICAL THERAPIST

## 2024-05-13 PROCEDURE — 97112 NEUROMUSCULAR REEDUCATION: CPT | Performed by: PHYSICAL THERAPIST

## 2024-05-13 PROCEDURE — 97140 MANUAL THERAPY 1/> REGIONS: CPT | Performed by: PHYSICAL THERAPIST

## 2024-05-13 NOTE — PROGRESS NOTES
Daily Note     Today's date: 2024  Patient name: Theresa Kim  : 1965  MRN: [id number]  Referring provider: Joselin Brumfield DO  Dx:   Encounter Diagnosis     ICD-10-CM    1. Right elbow pain  M25.521                      Subjective: Patient reports that she woke up this morning and for the first time feels as though it is working. She was able to do a lot of gardening and lifting heavier objects with less pain.       Objective: See treatment diary below      Assessment: Tolerated treatment well; initiated POC with MH to R elbow while performing there ex. Vcs provided on proper sequencing with exercises; she reports challenged with 5 lb weights. She denies having increased pain throughout session. MT performed after receiving consent from pt; she has increased tone to extensor mass today which improves post MT. Discussed modalities post session.  Patient demonstrated fatigue post treatment and would benefit from continued PT      Plan: Continue per plan of care.      Precautions: No past medical history on file. HTN        Access Code: [email or web address]  URL: [email or web address]  Date: 2024  Prepared by: Nancy Colby    Exercises  - Median Nerve Flossing - Tray  - 1 x daily - 7 x weekly - 2 sets - 10 reps  - Ulnar Nerve Flossing  - 1 x daily - 7 x weekly - 2 sets - 10 reps  - Seated Cervical Retraction and Extension  - 1 x daily - 7 x weekly - 3 sets - 10 reps  - Neck Retraction  - 1 x daily - 7 x weekly - 3 sets - 10 reps    Date 2/26 3/1 3/4 3/7 3/12 3/15         Visit # 1 2 3 4 5 6         FOTO done              Re-eval                     Manuals 5/13  4/22 4/25 4/30 5/3 5/6 5/9   PROM           Tspine           Elbow mobilization SMF with distraction, AP glides of RU joint  SMF with distraction, AP glides of RU jt; elbow flexion with distraction SMF with distraction, AP glides of RU jt, elbow flexion with distraction SMF with distraction, AP glides of RU jt, elbow flexion  with distraction SMF with distraction, AP glides of RU jt,  SMF with distraction, AP glides of RU jt SMF with distraction, AP glides of RU jt   Taping Tricep                       Tricep IASTM   SMF SMF       IASTM along extensor mass SMF  SMF SMF SMF SMF SMF SMF   Active release of Extensor mass SMF 10x  NV SMF 10x SMF 10x SMF 10x SMF 10x SMF 10x   Cupping SMF along extensory mass (3 cups for 2 mins f/b sliding)    SMF  SMF along extensor mass (3 cups for 2 minutes f/b sliding) SMF along extensor mass (3 cups for 2 mins f/b sliding) SMF along extensor mass (3 cups for 2 mins f/b sliding)  SMF along extensor mass (3 cups for 2 mins f/b siding)   PA glides cervical            Cervical jt mobs                       Neuro Re-Ed           Rhythmic stab           Prone Ys, Ts, Is           Scap squeezes           Prone ext           Median nerve glides 2 sec; 2x10  2 sec; 2x10 2 sec; 2x10 2 sec; 2x10 2 sec; 2x10 2 sec; 2x10 2 sec; 2x10   Ulnar nerve glides           Radial N glides with head turn to the left 2x10  2x10 2x10 2x10 2x10 2x10 2x10   Cerv retract/ext repeated           SNAGs Cervical Extension           Seated thoracic ext           Standing Thoracic Extension with hands behind head with elbows sliding up wall           Bicep stretch using biodex 15 sec x 3  20 sec x 3 NV 10 sec x 3 10 sec x 3 15 sec x 3 15 sec x 3   Pec stretch in doorway   20 sec x 3 ea NV NV?      Elbow Flexion stretch c small towel           Seated thoracic extension stretch  (soccer ball)  2x10 (hands clasped and second round with hands behind head)  2x10 (using soccer ball)  2x10 (hands clasped and second round with hands behind head) 2x10 (hands clasped and second round with hands behind head) 2x10 (hands clapsed and second round with bands behind head) 2x10 (hands clapsed and second round with bands behind head)  2x10 (hands clasped and second round with hands behind head)   Sideying Thoracic Rotation stretch           Cerivcal  retraction           Cervical retraction to exntesion   10x        Ther Ex           Wrist ext/flex DB 3# 30 ea  3# 30 ea 3# 30 ea 3# 30 ea 3# 30 ea 3# 30 ea 3# 30 ea   Diggiflex (all, individual) Red(all): 20; Yellow: 8 (individual)   Yellow (all): 10; 5 (individual)  Yellow (all): 10; 5 (individual)  Yellow (all): 10; 5 (individual) Yellow (all): 10; 5 (individual) Yellow (all): 20; 8 (individual)  Yellow (all): 20; 8 (individual)    Pronation with supination during elbow flexion 3# 10; 4# 10; 5# 10x      2# 10, 3# 10, 4# 10 3# 10; 4# 2x10                         Tricep ext           Rows, Ext            Wrist flex, ext            strengthening            No moneys            UBE           Supination stretch           Tricep stretch           Therastick wrist flexion and extension eccentric Red: 5 sec x 10 ea  NV    Red: 2 sec x 10 ea Red: 5 sec x 10 ea   Therastick shakes AP, M/L Red: 30 sec x 1 ea (AP straight elbow and ML initial straight elbow then flexed elbow)       Red: 30 sec x 1 ea  Red: 30 sec x 1 ea (AP straight arm, M/L bent elbow)    Supination and pronation 3# 30  3# 30 3# 30 3# 30 3# 30 3# 30 3# 30   Ther Activity                                   Gait Training                                   Modalities            MH 5 mins  5 mins 5 mins 5 mins 5 mins 5 mins 5 mins

## 2024-05-16 ENCOUNTER — APPOINTMENT (OUTPATIENT)
Dept: PHYSICAL THERAPY | Facility: CLINIC | Age: 59
End: 2024-05-16
Payer: COMMERCIAL

## 2024-05-17 ENCOUNTER — OFFICE VISIT (OUTPATIENT)
Dept: PHYSICAL THERAPY | Facility: CLINIC | Age: 59
End: 2024-05-17
Payer: COMMERCIAL

## 2024-05-17 DIAGNOSIS — M25.521 RIGHT ELBOW PAIN: Primary | ICD-10-CM

## 2024-05-17 PROCEDURE — 97140 MANUAL THERAPY 1/> REGIONS: CPT | Performed by: PHYSICAL THERAPIST

## 2024-05-17 PROCEDURE — 97112 NEUROMUSCULAR REEDUCATION: CPT | Performed by: PHYSICAL THERAPIST

## 2024-05-17 PROCEDURE — 97110 THERAPEUTIC EXERCISES: CPT | Performed by: PHYSICAL THERAPIST

## 2024-05-17 NOTE — PROGRESS NOTES
Daily Note     Today's date: 2024  Patient name: Theresa Kim  : 1965  MRN: [id number]  Referring provider: Joselin Brumfield DO  Dx:   Encounter Diagnosis     ICD-10-CM    1. Right elbow pain  M25.521                      Subjective: Patient reports that she definitely feels as though they are improving.       Objective: See treatment diary below      Assessment: Tolerated treatment well; initiated POC with MH to R elbow while performing there ex. Vcs provided on proper sequencing with exercises; added metal gripper. MT performed after receiving consent from pt; she continues to have increased tone medial aspect of extensor mass which improves post TPR/STM.   Patient demonstrated fatigue post treatment and would benefit from continued PT      Plan: Continue per plan of care.      Precautions: No past medical history on file. HTN        Access Code: [email or web address]  URL: [email or web address]  Date: 2024  Prepared by: Nancy Colby    Exercises  - Median Nerve Flossing - Tray  - 1 x daily - 7 x weekly - 2 sets - 10 reps  - Ulnar Nerve Flossing  - 1 x daily - 7 x weekly - 2 sets - 10 reps  - Seated Cervical Retraction and Extension  - 1 x daily - 7 x weekly - 3 sets - 10 reps  - Neck Retraction  - 1 x daily - 7 x weekly - 3 sets - 10 reps    Date 2/26 3/1 3/4 3/7 3/12 3/15         Visit # 1 2 3 4 5 6         FOTO done              Re-eval                     Manuals 5/13 5/17  4/25 4/30 5/3 5/6 5/9   PROM           Tspine           Elbow mobilization SMF with distraction, AP glides of RU joint SMF AP glides of RU jt  SMF with distraction, AP glides of RU jt, elbow flexion with distraction SMF with distraction, AP glides of RU jt, elbow flexion with distraction SMF with distraction, AP glides of RU jt,  SMF with distraction, AP glides of RU jt SMF with distraction, AP glides of RU jt   Taping Tricep                       Tricep IASTM    SMF       IASTM along extensor mass SMF SMF   SMF SMF SMF SMF SMF   Active release of Extensor mass SMF 10x NV?  SMF 10x SMF 10x SMF 10x SMF 10x SMF 10x   Cupping SMF along extensory mass (3 cups for 2 mins f/b sliding)  SMF along extensor mass (3 cups for 2 mins f/b sliding)  SMF  SMF along extensor mass (3 cups for 2 minutes f/b sliding) SMF along extensor mass (3 cups for 2 mins f/b sliding) SMF along extensor mass (3 cups for 2 mins f/b sliding)  SMF along extensor mass (3 cups for 2 mins f/b siding)   PA glides cervical            Cervical jt mobs                       Neuro Re-Ed           Rhythmic stab           Prone Ys, Ts, Is           Scap squeezes           Prone ext           Median nerve glides 2 sec; 2x10 2 sec; 2x10  2 sec; 2x10 2 sec; 2x10 2 sec; 2x10 2 sec; 2x10 2 sec; 2x10   Ulnar nerve glides           Radial N glides with head turn to the left 2x10 2x10  2x10 2x10 2x10 2x10 2x10   Cerv retract/ext repeated           SNAGs Cervical Extension           Seated thoracic ext           Standing Thoracic Extension with hands behind head with elbows sliding up wall           Bicep stretch using biodex 15 sec x 3 15 sec x 5  NV 10 sec x 3 10 sec x 3 15 sec x 3 15 sec x 3   Pec stretch in doorway    NV NV?      Elbow Flexion stretch c small towel           Seated thoracic extension stretch  (soccer ball)  2x10 (hands clasped and second round with hands behind head) 2x10 (hands clasped and second round with hands behind head)  2x10 (hands clasped and second round with hands behind head) 2x10 (hands clasped and second round with hands behind head) 2x10 (hands clapsed and second round with bands behind head) 2x10 (hands clapsed and second round with bands behind head)  2x10 (hands clasped and second round with hands behind head)   Sideying Thoracic Rotation stretch           Cerivcal retraction           Cervical retraction to exntesion           Ther Ex           Wrist ext/flex DB 3# 30 ea 3# 30 ea  3# 30 ea 3# 30 ea 3# 30 ea 3# 30 ea 3# 30 ea    Diggiflex (all, individual) Red(all): 20; Yellow: 8 (individual)  Red (all): 20; Yellow 8 (individual)  Yellow (all): 10; 5 (individual)  Yellow (all): 10; 5 (individual) Yellow (all): 10; 5 (individual) Yellow (all): 20; 8 (individual)  Yellow (all): 20; 8 (individual)    Metal gripper  35# 10x         Pronation with supination during elbow flexion 3# 10; 4# 10; 5# 10x 4# 20  5# 10     2# 10, 3# 10, 4# 10 3# 10; 4# 2x10                         Tricep ext           Rows, Ext            Wrist flex, ext            strengthening            No moneys            UBE           Supination stretch           Tricep stretch           Therastick wrist flexion and extension eccentric Red: 5 sec x 10 ea Red: 5 sec x 15 ea     Red: 2 sec x 10 ea Red: 5 sec x 10 ea   Therastick shakes AP, M/L Red: 30 sec x 1 ea (AP straight elbow and ML initial straight elbow then flexed elbow)  Red: 30 sec x 1 ea (straight arms)      Red: 30 sec x 1 ea  Red: 30 sec x 1 ea (AP straight arm, M/L bent elbow)    Supination and pronation 3# 30 3# 30  3# 30 3# 30 3# 30 3# 30 3# 30   Ther Activity                                   Gait Training                                   Modalities            MH 5 mins 5 mins  5 mins 5 mins 5 mins 5 mins 5 mins

## 2024-05-20 ENCOUNTER — OFFICE VISIT (OUTPATIENT)
Dept: PHYSICAL THERAPY | Facility: CLINIC | Age: 59
End: 2024-05-20
Payer: COMMERCIAL

## 2024-05-20 DIAGNOSIS — M25.521 RIGHT ELBOW PAIN: Primary | ICD-10-CM

## 2024-05-20 PROCEDURE — 97140 MANUAL THERAPY 1/> REGIONS: CPT | Performed by: PHYSICAL THERAPIST

## 2024-05-20 PROCEDURE — 97110 THERAPEUTIC EXERCISES: CPT | Performed by: PHYSICAL THERAPIST

## 2024-05-20 PROCEDURE — 97112 NEUROMUSCULAR REEDUCATION: CPT | Performed by: PHYSICAL THERAPIST

## 2024-05-20 NOTE — PROGRESS NOTES
Daily Note     Today's date: 2024  Patient name: Theresa Kim  : 1965  MRN: [id number]  Referring provider: Joselin Brumfield DO  Dx:   Encounter Diagnosis     ICD-10-CM    1. Right elbow pain  M25.521                      Subjective: Patient reports that she feels good today.       Objective: See treatment diary below      Assessment: Tolerated treatment well; initiated POC with MH to R elbow while performing there ex. Vcs provided on proper sequencing with exercises; she reports feeling sore post session and had onset of cramping flexor mass. She was instructed on modalities and hydration.   Patient demonstrated fatigue post treatment and would benefit from continued PT      Plan: Continue per plan of care.      Precautions: No past medical history on file. HTN        Access Code: [email or web address]  URL: [email or web address]  Date: 2024  Prepared by: Nancy Colby    Exercises  - Median Nerve Flossing - Tray  - 1 x daily - 7 x weekly - 2 sets - 10 reps  - Ulnar Nerve Flossing  - 1 x daily - 7 x weekly - 2 sets - 10 reps  - Seated Cervical Retraction and Extension  - 1 x daily - 7 x weekly - 3 sets - 10 reps  - Neck Retraction  - 1 x daily - 7 x weekly - 3 sets - 10 reps    Date 2/26 3/1 3/4 3/7 3/12 3/15         Visit # 1 2 3 4 5 6         FOTO done              Re-eval                     Manuals 5/13 5/17 5/20  4/30 5/3 5/6 5/9   PROM           Tspine           Elbow mobilization SMF with distraction, AP glides of RU joint SMF AP glides of RU jt SMF AP glides of RU jt  SMF with distraction, AP glides of RU jt, elbow flexion with distraction SMF with distraction, AP glides of RU jt,  SMF with distraction, AP glides of RU jt SMF with distraction, AP glides of RU jt   Taping Tricep                       Tricep IASTM           IASTM along extensor mass SMF SMF SMF  SMF SMF SMF SMF   Active release of Extensor mass SMF 10x NV? SMF 10x  SMF 10x SMF 10x SMF 10x SMF 10x   Cupping SMF  along extensory mass (3 cups for 2 mins f/b sliding)  SMF along extensor mass (3 cups for 2 mins f/b sliding) SMF along extensor mass (3 cups for 2 mins laterally and then medially followed sliding)  SMF along extensor mass (3 cups for 2 minutes f/b sliding) SMF along extensor mass (3 cups for 2 mins f/b sliding) SMF along extensor mass (3 cups for 2 mins f/b sliding)  SMF along extensor mass (3 cups for 2 mins f/b siding)   PA glides cervical            Cervical jt mobs                       Neuro Re-Ed           Rhythmic stab           Prone Ys, Ts, Is           Scap squeezes           Prone ext           Median nerve glides 2 sec; 2x10 2 sec; 2x10 2 sec; 2x10  2 sec; 2x10 2 sec; 2x10 2 sec; 2x10 2 sec; 2x10   Ulnar nerve glides           Radial N glides with head turn to the left 2x10 2x10 2x10  2x10 2x10 2x10 2x10   Cerv retract/ext repeated           SNAGs Cervical Extension           Seated thoracic ext           Standing Thoracic Extension with hands behind head with elbows sliding up wall           Bicep stretch using biodex 15 sec x 3 15 sec x 5 15 sec x 3  10 sec x 3 10 sec x 3 15 sec x 3 15 sec x 3   Pec stretch in doorway     NV?      Elbow Flexion stretch c small towel           Seated thoracic extension stretch  (soccer ball)  2x10 (hands clasped and second round with hands behind head) 2x10 (hands clasped and second round with hands behind head) 2x10 (hands clapsed and second round with hands behind head)  2x10 (hands clasped and second round with hands behind head) 2x10 (hands clapsed and second round with bands behind head) 2x10 (hands clapsed and second round with bands behind head)  2x10 (hands clasped and second round with hands behind head)   Sideying Thoracic Rotation stretch           Cerivcal retraction           Cervical retraction to exntesion           Ther Ex           UBE   NV        Wrist ext/flex DB 3# 30 ea 3# 30 ea 3# 30 ea  3# 30 ea 3# 30 ea 3# 30 ea 3# 30 ea   Diggiflex (all,  individual) Red(all): 20; Yellow: 8 (individual)  Red (all): 20; Yellow 8 (individual) Red (all): 20; Yellow 8 (individual)  Yellow (all): 10; 5 (individual) Yellow (all): 10; 5 (individual) Yellow (all): 20; 8 (individual)  Yellow (all): 20; 8 (individual)    Metal gripper  35# 10x 35# 10        Pronation with supination during elbow flexion 3# 10; 4# 10; 5# 10x 4# 20  5# 10 4# 20  5# 10    2# 10, 3# 10, 4# 10 3# 10; 4# 2x10                         Tricep ext           Rows, Ext            Wrist flex, ext stretch   20 sec x 3 ea         strengthening            No moneys            UBE           Supination stretch           Tricep stretch           Therastick wrist flexion and extension eccentric Red: 5 sec x 10 ea Red: 5 sec x 15 ea Red: 5 sec x 15 ea    Red: 2 sec x 10 ea Red: 5 sec x 10 ea   Therastick shakes AP, M/L Red: 30 sec x 1 ea (AP straight elbow and ML initial straight elbow then flexed elbow)  Red: 30 sec x 1 ea (straight arms)  Red: 30 sec x 1 ea (straight arms0     Red: 30 sec x 1 ea  Red: 30 sec x 1 ea (AP straight arm, M/L bent elbow)    Supination and pronation 3# 30 3# 30 3# 30  3# 30 3# 30 3# 30 3# 30   Ther Activity                                   Gait Training                                   Modalities            MH 5 mins 5 mins 5 mins  5 mins 5 mins 5 mins 5 mins

## 2024-05-23 ENCOUNTER — OFFICE VISIT (OUTPATIENT)
Dept: PHYSICAL THERAPY | Facility: CLINIC | Age: 59
End: 2024-05-23
Payer: COMMERCIAL

## 2024-05-23 DIAGNOSIS — M25.521 RIGHT ELBOW PAIN: Primary | ICD-10-CM

## 2024-05-23 PROCEDURE — 97112 NEUROMUSCULAR REEDUCATION: CPT | Performed by: PHYSICAL THERAPIST

## 2024-05-23 PROCEDURE — 97110 THERAPEUTIC EXERCISES: CPT | Performed by: PHYSICAL THERAPIST

## 2024-05-23 PROCEDURE — 97140 MANUAL THERAPY 1/> REGIONS: CPT | Performed by: PHYSICAL THERAPIST

## 2024-05-23 NOTE — PROGRESS NOTES
Daily Note     Today's date: 2024  Patient name: Theresa Kim  : 1965  MRN: [id number]  Referring provider: Joselin Brumfield DO  Dx:   Encounter Diagnosis     ICD-10-CM    1. Right elbow pain  M25.521                      Subjective: Patient reports that she does feel tight today and is concerned with the tightness with palpation.       Objective: See treatment diary below      Assessment: Tolerated treatment well; initiated POC with UBE for active warmup fwd and retrograde. Vcs provided on proper sequencing with exercises; she has increased tightness on the flexor mass of her forearm and added wrist flexion stretching by leaning on table. Added hammer with supnation and pronation for increase lever arm stressors. Also applied cupping along flexor mass. She demonstrates improving tolerance with exercises and experienced fatigue.  Patient demonstrated fatigue post treatment and would benefit from continued PT      Plan: Continue per plan of care.      Precautions: No past medical history on file. HTN        Access Code: [email or web address]  URL: [email or web address]  Date: 2024  Prepared by: Nancy Colby    Exercises  - Median Nerve Flossing - Tray  - 1 x daily - 7 x weekly - 2 sets - 10 reps  - Ulnar Nerve Flossing  - 1 x daily - 7 x weekly - 2 sets - 10 reps  - Seated Cervical Retraction and Extension  - 1 x daily - 7 x weekly - 3 sets - 10 reps  - Neck Retraction  - 1 x daily - 7 x weekly - 3 sets - 10 reps    Date 2/26 3/1 3/4 3/7 3/12 3/15         Visit # 1 2 3 4 5 6         FOTO done              Re-eval                     Manuals    PROM           Tspine           Elbow mobilization SMF with distraction, AP glides of RU joint SMF AP glides of RU jt SMF AP glides of RU jt SMF AP glides of RU jt   SMF with distraction, AP glides of RU jt SMF with distraction, AP glides of RU jt   Taping Tricep                       Tricep IASTM           IASTM along  extensor mass SMF SMF SMF SMF   SMF SMF   Active release of Extensor mass SMF 10x NV? SMF 10x SMF 10x   SMF 10x SMF 10x   Cupping SMF along extensory mass (3 cups for 2 mins f/b sliding)  SMF along extensor mass (3 cups for 2 mins f/b sliding) SMF along extensor mass (3 cups for 2 mins laterally and then medially followed sliding) SMF along extensor mass (3 cups for 2 mins laterally and then medially followed sliding)   SMF along extensor mass (3 cups for 2 mins f/b sliding)  SMF along extensor mass (3 cups for 2 mins f/b siding)   PA glides cervical            Cervical jt mobs                       Neuro Re-Ed           Rhythmic stab           Prone Ys, Ts, Is           Scap squeezes           Prone ext           Median nerve glides 2 sec; 2x10 2 sec; 2x10 2 sec; 2x10 2 sec; 2x10   2 sec; 2x10 2 sec; 2x10   Ulnar nerve glides           Radial N glides with head turn to the left 2x10 2x10 2x10 2x10   2x10 2x10   Cerv retract/ext repeated           SNAGs Cervical Extension           Seated thoracic ext           Standing Thoracic Extension with hands behind head with elbows sliding up wall           Bicep stretch using biodex 15 sec x 3 15 sec x 5 15 sec x 3    15 sec x 3 15 sec x 3   Pec stretch in doorway           Elbow Flexion stretch c small towel           Seated thoracic extension stretch  (soccer ball)  2x10 (hands clasped and second round with hands behind head) 2x10 (hands clasped and second round with hands behind head) 2x10 (hands clapsed and second round with hands behind head) 2x10 (hands clasped and second round with hands behind head)   2x10 (hands clapsed and second round with bands behind head)  2x10 (hands clasped and second round with hands behind head)   Sideying Thoracic Rotation stretch           Cerivcal retraction           Cervical retraction to exntesion           Ther Ex           UBE   NV 2 mins fwd/retro       Wrist ext/flex DB 3# 30 ea 3# 30 ea 3# 30 ea 3# 30 ea   3# 30 ea 3# 30 ea    Diggiflex (all, individual) Red(all): 20; Yellow: 8 (individual)  Red (all): 20; Yellow 8 (individual) Red (all): 20; Yellow 8 (individual) Red (all): 20; Yellow 8 (individual)    Yellow (all): 20; 8 (individual)  Yellow (all): 20; 8 (individual)    Metal gripper  35# 10x 35# 10 35# 10x       Pronation with supination during elbow flexion 3# 10; 4# 10; 5# 10x 4# 20  5# 10 4# 20  5# 10 4# 10  5# 10   2# 10, 3# 10, 4# 10 3# 10; 4# 2x10                         Tricep ext           Rows, Ext            Wrist flex, ext stretch   20 sec x 3 ea 20 sec x 3 ea        strengthening            No moneys            UBE           Supination stretch           Tricep stretch           Therastick wrist flexion and extension eccentric Red: 5 sec x 10 ea Red: 5 sec x 15 ea Red: 5 sec x 15 ea Red: 5 sec x 15 ea    Red: 2 sec x 10 ea Red: 5 sec x 10 ea   Therastick shakes AP, M/L Red: 30 sec x 1 ea (AP straight elbow and ML initial straight elbow then flexed elbow)  Red: 30 sec x 1 ea (straight arms)  Red: 30 sec x 1 ea (straight arms0  Red: 30 sec x 1 ea (straight arms)    Red: 30 sec x 1 ea  Red: 30 sec x 1 ea (AP straight arm, M/L bent elbow)    Supination and pronation 3# 30 3# 30 3# 30 3# 20; Terry alatorre 1.5# 10 ea   3# 30 3# 30   Ther Activity                                   Gait Training                                   Modalities            MH 5 mins 5 mins 5 mins 5 mins   5 mins 5 mins

## 2024-05-28 ENCOUNTER — OFFICE VISIT (OUTPATIENT)
Dept: PHYSICAL THERAPY | Facility: CLINIC | Age: 59
End: 2024-05-28
Payer: COMMERCIAL

## 2024-05-28 DIAGNOSIS — M25.521 RIGHT ELBOW PAIN: Primary | ICD-10-CM

## 2024-05-28 PROCEDURE — 97140 MANUAL THERAPY 1/> REGIONS: CPT | Performed by: PHYSICAL THERAPIST

## 2024-05-28 PROCEDURE — 97110 THERAPEUTIC EXERCISES: CPT | Performed by: PHYSICAL THERAPIST

## 2024-05-28 NOTE — PROGRESS NOTES
Daily Note     Today's date: 2024  Patient name: Theresa Kim  : 1965  MRN: [id number]  Referring provider: Joselin Brumfield DO  Dx:   Encounter Diagnosis     ICD-10-CM    1. Right elbow pain  M25.521                      Subjective: Patient reports that she was sore after last visit.       Objective: See treatment diary below      Assessment: Tolerated treatment well; initiated POC with MH to R elbow while performing there ex. Vcs provided on proper sequencing with exercises; therapist modified program today. MT performed after receiving consent from pt; she has increased tone along extensor and flexor mass which slight improved post STM/IASTM.  Patient demonstrated fatigue post treatment and would benefit from continued PT      Plan: Continue per plan of care.      Precautions: No past medical history on file. HTN        Access Code: [email or web address]  URL: [email or web address]  Date: 2024  Prepared by: Nancy Colby    Exercises  - Median Nerve Flossing - Tray  - 1 x daily - 7 x weekly - 2 sets - 10 reps  - Ulnar Nerve Flossing  - 1 x daily - 7 x weekly - 2 sets - 10 reps  - Seated Cervical Retraction and Extension  - 1 x daily - 7 x weekly - 3 sets - 10 reps  - Neck Retraction  - 1 x daily - 7 x weekly - 3 sets - 10 reps    Date 2/26 3/1 3/4 3/7 3/12 3/15         Visit # 1 2 3 4 5 6         FOTO done              Re-eval                     Manuals    PROM           Tspine           Elbow mobilization SMF with distraction, AP glides of RU joint SMF AP glides of RU jt SMF AP glides of RU jt SMF AP glides of RU jt SMF AP glides of RU jt  SMF with distraction, AP glides of RU jt SMF with distraction, AP glides of RU jt   Taping Tricep                       Tricep IASTM           IASTM along extensor mass SMF SMF SMF SMF SMF ext and flexor  SMF SMF   Active release of Extensor mass SMF 10x NV? SMF 10x SMF 10x NV  SMF 10x SMF 10x   Cupping SMF along  extensory mass (3 cups for 2 mins f/b sliding)  SMF along extensor mass (3 cups for 2 mins f/b sliding) SMF along extensor mass (3 cups for 2 mins laterally and then medially followed sliding) SMF along extensor mass (3 cups for 2 mins laterally and then medially followed sliding) SMF along extensor mass (3 cups for 2 mins laterally and medially f/b sliding)  SMF along extensor mass (3 cups for 2 mins f/b sliding)  SMF along extensor mass (3 cups for 2 mins f/b siding)   PA glides cervical            Cervical jt mobs                       Neuro Re-Ed           Rhythmic stab           Prone Ys, Ts, Is           Scap squeezes           Prone ext           Median nerve glides 2 sec; 2x10 2 sec; 2x10 2 sec; 2x10 2 sec; 2x10 NV?  2 sec; 2x10 2 sec; 2x10   Ulnar nerve glides           Radial N glides with head turn to the left 2x10 2x10 2x10 2x10 NV?  2x10 2x10   Cerv retract/ext repeated           SNAGs Cervical Extension           Seated thoracic ext           Standing Thoracic Extension with hands behind head with elbows sliding up wall           Bicep stretch using biodex 15 sec x 3 15 sec x 5 15 sec x 3    15 sec x 3 15 sec x 3   Pec stretch in doorway           Elbow Flexion stretch c small towel           Seated thoracic extension stretch  (soccer ball)  2x10 (hands clasped and second round with hands behind head) 2x10 (hands clasped and second round with hands behind head) 2x10 (hands clapsed and second round with hands behind head) 2x10 (hands clasped and second round with hands behind head) NV?  2x10 (hands clapsed and second round with bands behind head)  2x10 (hands clasped and second round with hands behind head)   Sideying Thoracic Rotation stretch           Cerivcal retraction           Cervical retraction to exntesion           Ther Ex           UBE   NV 2 mins fwd/retro NV      Wrist ext/flex DB 3# 30 ea 3# 30 ea 3# 30 ea 3# 30 ea 3# 20 ea  3# 30 ea 3# 30 ea   Diggiflex (all, individual) Red(all): 20;  Yellow: 8 (individual)  Red (all): 20; Yellow 8 (individual) Red (all): 20; Yellow 8 (individual) Red (all): 20; Yellow 8 (individual)  Red (all): 20; Yellow 8 (individual)  Yellow (all): 20; 8 (individual)  Yellow (all): 20; 8 (individual)    Metal gripper  35# 10x 35# 10 35# 10x NV?      Pronation with supination during elbow flexion 3# 10; 4# 10; 5# 10x 4# 20  5# 10 4# 20  5# 10 4# 10  5# 10 NV?  2# 10, 3# 10, 4# 10 3# 10; 4# 2x10                         Tricep ext           Rows, Ext            Wrist flex, ext stretch   20 sec x 3 ea 20 sec x 3 ea Manually       strengthening            No moneys            UBE           Supination stretch           Tricep stretch           Therastick wrist flexion and extension eccentric Red: 5 sec x 10 ea Red: 5 sec x 15 ea Red: 5 sec x 15 ea Red: 5 sec x 15 ea  Red: 5 sec x 15 ea  Red: 2 sec x 10 ea Red: 5 sec x 10 ea   Therastick shakes AP, M/L Red: 30 sec x 1 ea (AP straight elbow and ML initial straight elbow then flexed elbow)  Red: 30 sec x 1 ea (straight arms)  Red: 30 sec x 1 ea (straight arms0  Red: 30 sec x 1 ea (straight arms)  NV?  Red: 30 sec x 1 ea  Red: 30 sec x 1 ea (AP straight arm, M/L bent elbow)    Supination and pronation 3# 30 3# 30 3# 30 3# 20; Hammer c 1.5# 10 ea 3# 20;  Hammer NV?  3# 30 3# 30   Ther Activity                                   Gait Training                                   Modalities            MH 5 mins 5 mins 5 mins 5 mins 15 mins  5 mins 5 mins

## 2024-05-30 ENCOUNTER — APPOINTMENT (OUTPATIENT)
Dept: PHYSICAL THERAPY | Facility: CLINIC | Age: 59
End: 2024-05-30
Payer: COMMERCIAL

## 2024-06-03 ENCOUNTER — OFFICE VISIT (OUTPATIENT)
Dept: PHYSICAL THERAPY | Facility: CLINIC | Age: 59
End: 2024-06-03
Payer: COMMERCIAL

## 2024-06-03 DIAGNOSIS — M25.521 RIGHT ELBOW PAIN: Primary | ICD-10-CM

## 2024-06-03 PROCEDURE — 97110 THERAPEUTIC EXERCISES: CPT | Performed by: PHYSICAL THERAPIST

## 2024-06-03 PROCEDURE — 97140 MANUAL THERAPY 1/> REGIONS: CPT | Performed by: PHYSICAL THERAPIST

## 2024-06-03 NOTE — PROGRESS NOTES
Daily Note     Today's date: 6/3/2024  Patient name: Theresa Kim  : 1965  MRN: [id number]  Referring provider: Joselin Brumfield DO  Dx:   Encounter Diagnosis     ICD-10-CM    1. Right elbow pain  M25.521                      Subjective: Patient reports that her arm feels okay because she was not used it recently.       Objective: See treatment diary below      Assessment: Tolerated treatment well; initiated POC with MH to R elbow while performing there ex. Vcs provided on proper sequencing with exercises; resumed strengthening exercises. She denies increased pain throughout session. She has increased tone along extensor mass which improves post TPR.   Patient demonstrated fatigue post treatment and would benefit from continued PT      Plan: Continue per plan of care.      Precautions: No past medical history on file. HTN        Access Code: [email or web address]  URL: [email or web address]  Date: 2024  Prepared by: Nancy Colby    Exercises  - Median Nerve Flossing - Tray  - 1 x daily - 7 x weekly - 2 sets - 10 reps  - Ulnar Nerve Flossing  - 1 x daily - 7 x weekly - 2 sets - 10 reps  - Seated Cervical Retraction and Extension  - 1 x daily - 7 x weekly - 3 sets - 10 reps  - Neck Retraction  - 1 x daily - 7 x weekly - 3 sets - 10 reps    Date 2/26 3/1 3/4 3/7 3/12 3/15         Visit # 1 2 3 4 5 6         FOTO done              Re-eval                     Manuals 5/13 5/17 5/20 5/23 5/28 6/3  5/9   PROM           Tspine           Elbow mobilization SMF with distraction, AP glides of RU joint SMF AP glides of RU jt SMF AP glides of RU jt SMF AP glides of RU jt SMF AP glides of RU jt SMF AP glides of RU jt  SMF with distraction, AP glides of RU jt   Taping Tricep                       Tricep IASTM           IASTM along extensor mass SMF SMF SMF SMF SMF ext and flexor SMF ext focused  SMF   Active release of Extensor mass SMF 10x NV? SMF 10x SMF 10x NV SMF 10x  SMF 10x   Cupping SMF along  extensory mass (3 cups for 2 mins f/b sliding)  SMF along extensor mass (3 cups for 2 mins f/b sliding) SMF along extensor mass (3 cups for 2 mins laterally and then medially followed sliding) SMF along extensor mass (3 cups for 2 mins laterally and then medially followed sliding) SMF along extensor mass (3 cups for 2 mins laterally and medially f/b sliding) SMF along extensor mass (3 cups for 2 mins) NV perform sliding  SMF along extensor mass (3 cups for 2 mins f/b siding)   PA glides cervical            Cervical jt mobs                       Neuro Re-Ed           Rhythmic stab           Prone Ys, Ts, Is           Scap squeezes           Prone ext           Median nerve glides 2 sec; 2x10 2 sec; 2x10 2 sec; 2x10 2 sec; 2x10 NV? 2 sec; 2x10  2 sec; 2x10   Ulnar nerve glides           Radial N glides with head turn to the left 2x10 2x10 2x10 2x10 NV? 2 sec; 2x10  2x10   Cerv retract/ext repeated           SNAGs Cervical Extension           Seated thoracic ext           Standing Thoracic Extension with hands behind head with elbows sliding up wall           Bicep stretch using biodex 15 sec x 3 15 sec x 5 15 sec x 3     15 sec x 3   Pec stretch in doorway           Elbow Flexion stretch c small towel           Seated thoracic extension stretch  (soccer ball)  2x10 (hands clasped and second round with hands behind head) 2x10 (hands clasped and second round with hands behind head) 2x10 (hands clapsed and second round with hands behind head) 2x10 (hands clasped and second round with hands behind head) NV? 2x10 (hands clasped and second round with hands behind head)  2x10 (hands clasped and second round with hands behind head)   Sideying Thoracic Rotation stretch           Cerivcal retraction           Cervical retraction to exntesion           Ther Ex           UBE   NV 2 mins fwd/retro NV NV?     Wrist ext/flex DB 3# 30 ea 3# 30 ea 3# 30 ea 3# 30 ea 3# 20 ea 3# 30 ea  3# 30 ea   Diggiflex (all, individual) Red(all):  20; Yellow: 8 (individual)  Red (all): 20; Yellow 8 (individual) Red (all): 20; Yellow 8 (individual) Red (all): 20; Yellow 8 (individual)  Red (all): 20; Yellow 8 (individual) Red (all): 20; Yellow 10 (individual)   Yellow (all): 20; 8 (individual)    Metal gripper  35# 10x 35# 10 35# 10x NV? 35#      Pronation with supination during elbow flexion 3# 10; 4# 10; 5# 10x 4# 20  5# 10 4# 20  5# 10 4# 10  5# 10 NV? 4# 10  5# 2x10  3# 10; 4# 2x10                         Tricep ext           Rows, Ext            Wrist flex, ext stretch   20 sec x 3 ea 20 sec x 3 ea Manually Manually extension stretch only      strengthening            No moneys            UBE           Supination stretch           Tricep stretch           Therastick wrist flexion and extension eccentric Red: 5 sec x 10 ea Red: 5 sec x 15 ea Red: 5 sec x 15 ea Red: 5 sec x 15 ea  Red: 5 sec x 15 ea Red: 5 sec x 20 ea  Red: 5 sec x 10 ea   Therastick shakes AP, M/L Red: 30 sec x 1 ea (AP straight elbow and ML initial straight elbow then flexed elbow)  Red: 30 sec x 1 ea (straight arms)  Red: 30 sec x 1 ea (straight arms0  Red: 30 sec x 1 ea (straight arms)  NV? Red: 30 sec x 1 ea (straight arm)  Red: 30 sec x 1 ea (AP straight arm, M/L bent elbow)    Supination and pronation 3# 30 3# 30 3# 30 3# 20; Hammer c 1.5# 10 ea 3# 20;  Hammer NV? 3# 30;   3# 30   Ther Activity                                   Gait Training                                   Modalities            MH 5 mins 5 mins 5 mins 5 mins 15 mins 15 mins with there ex  5 mins

## 2024-06-06 ENCOUNTER — OFFICE VISIT (OUTPATIENT)
Dept: PHYSICAL THERAPY | Facility: CLINIC | Age: 59
End: 2024-06-06
Payer: COMMERCIAL

## 2024-06-06 DIAGNOSIS — M25.521 RIGHT ELBOW PAIN: Primary | ICD-10-CM

## 2024-06-06 PROCEDURE — 97140 MANUAL THERAPY 1/> REGIONS: CPT | Performed by: PHYSICAL THERAPIST

## 2024-06-06 PROCEDURE — 97110 THERAPEUTIC EXERCISES: CPT | Performed by: PHYSICAL THERAPIST

## 2024-06-06 PROCEDURE — 97112 NEUROMUSCULAR REEDUCATION: CPT | Performed by: PHYSICAL THERAPIST

## 2024-06-06 NOTE — PROGRESS NOTES
Daily Note     Today's date: 2024  Patient name: Theresa Kim  : 1965  MRN: [id number]  Referring provider: Joselin Brumfield DO  Dx:   Encounter Diagnosis     ICD-10-CM    1. Right elbow pain  M25.521                      Subjective: Patient reports that she was gardening more and has some soreness in her arm.       Objective: See treatment diary below      Assessment: Tolerated treatment well; initiated POC with UBE for active warmup. Vcs provided on proper sequencing with exercises. Redness noted post MH and instructed on monitoring her skin. She was told to not apply heat to make sure skin continues to improve.  Patient demonstrated fatigue post treatment and would benefit from continued PT      Plan: Continue per plan of care. Progress  strengthening.     Precautions: No past medical history on file. HTN        Access Code: [email or web address]  URL: [email or web address]  Date: 2024  Prepared by: Nancy Colby    Exercises  - Median Nerve Flossing - Tray  - 1 x daily - 7 x weekly - 2 sets - 10 reps  - Ulnar Nerve Flossing  - 1 x daily - 7 x weekly - 2 sets - 10 reps  - Seated Cervical Retraction and Extension  - 1 x daily - 7 x weekly - 3 sets - 10 reps  - Neck Retraction  - 1 x daily - 7 x weekly - 3 sets - 10 reps    Date 2/26 3/1 3/4 3/7 3/12 3/15         Visit # 1 2 3 4 5 6         FOTO done              Re-eval                     Manuals 5/13 5/17 5/20 5/23 5/28 6/3 6/6    PROM           Tspine           Elbow mobilization SMF with distraction, AP glides of RU joint SMF AP glides of RU jt SMF AP glides of RU jt SMF AP glides of RU jt SMF AP glides of RU jt SMF AP glides of RU jt NV    Taping Tricep                       Tricep IASTM           IASTM along extensor mass SMF SMF SMF SMF SMF ext and flexor SMF ext focused SMF ext mass focus    Active release of Extensor mass SMF 10x NV? SMF 10x SMF 10x NV SMF 10x NV?    Cupping SMF along extensory mass (3 cups for 2 mins f/b  sliding)  SMF along extensor mass (3 cups for 2 mins f/b sliding) SMF along extensor mass (3 cups for 2 mins laterally and then medially followed sliding) SMF along extensor mass (3 cups for 2 mins laterally and then medially followed sliding) SMF along extensor mass (3 cups for 2 mins laterally and medially f/b sliding) SMF along extensor mass (3 cups for 2 mins) NV perform sliding SMF along extensor mass (3 cups for 1 min x 2) f/b sliding along extensor mass    PA glides cervical            Cervical jt mobs                       Neuro Re-Ed           Rhythmic stab           Prone Ys, Ts, Is           Scap squeezes           Prone ext           Median nerve glides 2 sec; 2x10 2 sec; 2x10 2 sec; 2x10 2 sec; 2x10 NV? 2 sec; 2x10 2 sec; 2x10    Ulnar nerve glides           Radial N glides with head turn to the left 2x10 2x10 2x10 2x10 NV? 2 sec; 2x10 2 sec; 2x10    Cerv retract/ext repeated           SNAGs Cervical Extension           Seated thoracic ext           Standing Thoracic Extension with hands behind head with elbows sliding up wall           Bicep stretch using biodex 15 sec x 3 15 sec x 5 15 sec x 3        Pec stretch in doorway           Elbow Flexion stretch c small towel           Seated thoracic extension stretch  (soccer ball)  2x10 (hands clasped and second round with hands behind head) 2x10 (hands clasped and second round with hands behind head) 2x10 (hands clapsed and second round with hands behind head) 2x10 (hands clasped and second round with hands behind head) NV? 2x10 (hands clasped and second round with hands behind head) 2x10 (hands clasped and second round with hands behind head)    Sideying Thoracic Rotation stretch           Cerivcal retraction           Cervical retraction to exntesion           Ther Ex           UBE   NV 2 mins fwd/retro NV NV? 2 mins fwd/retro    Wrist ext/flex DB 3# 30 ea 3# 30 ea 3# 30 ea 3# 30 ea 3# 20 ea 3# 30 ea 3# 30 ea     Diggiflex (all, individual) Red(all):  20; Yellow: 8 (individual)  Red (all): 20; Yellow 8 (individual) Red (all): 20; Yellow 8 (individual) Red (all): 20; Yellow 8 (individual)  Red (all): 20; Yellow 8 (individual) Red (all): 20; Yellow 10 (individual)  Red (all): 20; Yellow: 10 (individual)     Metal gripper  35# 10x 35# 10 35# 10x NV? 35#  35# 30    Pronation with supination during elbow flexion 3# 10; 4# 10; 5# 10x 4# 20  5# 10 4# 20  5# 10 4# 10  5# 10 NV? 4# 10  5# 2x10 4# 1x10  5# 2x10                          Tricep ext           Rows, Ext            Wrist flex, ext stretch   20 sec x 3 ea 20 sec x 3 ea Manually Manually extension stretch only 15 sec x 3 ea     strengthening            No moneys            UBE           Supination stretch           Tricep stretch           Therastick wrist flexion and extension eccentric Red: 5 sec x 10 ea Red: 5 sec x 15 ea Red: 5 sec x 15 ea Red: 5 sec x 15 ea  Red: 5 sec x 15 ea Red: 5 sec x 20 ea Red: 5 sec x 20 ea    Therastick shakes AP, M/L Red: 30 sec x 1 ea (AP straight elbow and ML initial straight elbow then flexed elbow)  Red: 30 sec x 1 ea (straight arms)  Red: 30 sec x 1 ea (straight arms0  Red: 30 sec x 1 ea (straight arms)  NV? Red: 30 sec x 1 ea (straight arm) Red: 30 sec x 1 ea (straight arm)    Supination and pronation 3# 30 3# 30 3# 30 3# 20; Hammer c 1.5# 10 ea 3# 20;  Hammer NV? 3# 30;  3# 30    Supination and pronation with Hammer+weight       NV?    Ther Activity            Dunaway carry       NV?                Gait Training                                   Modalities            MH 5 mins 5 mins 5 mins 5 mins 15 mins 15 mins with there ex 10 mins with there ex

## 2024-06-10 ENCOUNTER — OFFICE VISIT (OUTPATIENT)
Dept: PHYSICAL THERAPY | Facility: CLINIC | Age: 59
End: 2024-06-10
Payer: COMMERCIAL

## 2024-06-10 DIAGNOSIS — M25.521 RIGHT ELBOW PAIN: Primary | ICD-10-CM

## 2024-06-10 PROCEDURE — 97110 THERAPEUTIC EXERCISES: CPT | Performed by: PHYSICAL THERAPIST

## 2024-06-10 PROCEDURE — 97140 MANUAL THERAPY 1/> REGIONS: CPT | Performed by: PHYSICAL THERAPIST

## 2024-06-10 PROCEDURE — 97112 NEUROMUSCULAR REEDUCATION: CPT | Performed by: PHYSICAL THERAPIST

## 2024-06-10 NOTE — PROGRESS NOTES
Daily Note     Today's date: 6/10/2024  Patient name: Theresa Kim  : 1965  MRN: [id number]  Referring provider: Joselin Brumfield DO  Dx:   Encounter Diagnosis     ICD-10-CM    1. Right elbow pain  M25.521                      Subjective: Patient reports that she feels tight.       Objective: See treatment diary below      Assessment: Tolerated treatment well; initiated POC with UBE for active warmup. Vcs provided on proper sequencing with exercises; encouraged finger extension during wrist flexion in order to challenge strength. MT performed after receiving consent from pt; she has increased tone to extensor mass which improves post TPR/STM/IASTM. Discussed DOMS post session.  Patient demonstrated fatigue post treatment and would benefit from continued PT      Plan: Continue per plan of care.      Precautions: No past medical history on file. HTN        Access Code: [email or web address]  URL: [email or web address]  Date: 2024  Prepared by: Nancy Colby    Exercises  - Median Nerve Flossing - Tray  - 1 x daily - 7 x weekly - 2 sets - 10 reps  - Ulnar Nerve Flossing  - 1 x daily - 7 x weekly - 2 sets - 10 reps  - Seated Cervical Retraction and Extension  - 1 x daily - 7 x weekly - 3 sets - 10 reps  - Neck Retraction  - 1 x daily - 7 x weekly - 3 sets - 10 reps    Date 2/26 3/1 3/4 3/7 3/12 3/15         Visit # 1 2 3 4 5 6         FOTO done              Re-eval                     Manuals 5/13 5/17 5/20 5/23 5/28 6/3 6/6 6/10   PROM           Tspine           Elbow mobilization SMF with distraction, AP glides of RU joint SMF AP glides of RU jt SMF AP glides of RU jt SMF AP glides of RU jt SMF AP glides of RU jt SMF AP glides of RU jt NV    Taping Tricep                       Tricep IASTM           IASTM along extensor mass SMF SMF SMF SMF SMF ext and flexor SMF ext focused SMF ext mass focus SMF ext mass focus   Active release of Extensor mass SMF 10x NV? SMF 10x SMF 10x NV SMF 10x NV? SMF    Cupping SMF along extensory mass (3 cups for 2 mins f/b sliding)  SMF along extensor mass (3 cups for 2 mins f/b sliding) SMF along extensor mass (3 cups for 2 mins laterally and then medially followed sliding) SMF along extensor mass (3 cups for 2 mins laterally and then medially followed sliding) SMF along extensor mass (3 cups for 2 mins laterally and medially f/b sliding) SMF along extensor mass (3 cups for 2 mins) NV perform sliding SMF along extensor mass (3 cups for 1 min x 2) f/b sliding along extensor mass SMF along extensor msas (3 cups for 2 min x 1) f/b sliding along extensor mass   PA glides cervical            Cervical jt mobs                       Neuro Re-Ed           Rhythmic stab           Prone Ys, Ts, Is           Scap squeezes           Prone ext           Median nerve glides 2 sec; 2x10 2 sec; 2x10 2 sec; 2x10 2 sec; 2x10 NV? 2 sec; 2x10 2 sec; 2x10 2 sec; 2x10   Ulnar nerve glides           Radial N glides with head turn to the left 2x10 2x10 2x10 2x10 NV? 2 sec; 2x10 2 sec; 2x10 2 sec; 2x10   Cerv retract/ext repeated           SNAGs Cervical Extension           Seated thoracic ext           Standing Thoracic Extension with hands behind head with elbows sliding up wall           Bicep stretch using biodex 15 sec x 3 15 sec x 5 15 sec x 3     15 sec x 3 ea   Pec stretch in doorway           Elbow Flexion stretch c small towel           Seated thoracic extension stretch  (soccer ball)  2x10 (hands clasped and second round with hands behind head) 2x10 (hands clasped and second round with hands behind head) 2x10 (hands clapsed and second round with hands behind head) 2x10 (hands clasped and second round with hands behind head) NV? 2x10 (hands clasped and second round with hands behind head) 2x10 (hands clasped and second round with hands behind head) 2x10 (hands clasped and second round with hands behind head)   Sideying Thoracic Rotation stretch           Cerivcal retraction           Cervical  retraction to exntesion           Ther Ex           UBE   NV 2 mins fwd/retro NV NV? 2 mins fwd/retro 2 mins fwd/retro ea   Wrist ext/flex DB 3# 30 ea 3# 30 ea 3# 30 ea 3# 30 ea 3# 20 ea 3# 30 ea 3# 30 ea  4# 2x10 ea (rolling weights on finger tips during flexion)    Diggiflex (all, individual) Red(all): 20; Yellow: 8 (individual)  Red (all): 20; Yellow 8 (individual) Red (all): 20; Yellow 8 (individual) Red (all): 20; Yellow 8 (individual)  Red (all): 20; Yellow 8 (individual) Red (all): 20; Yellow 10 (individual)  Red (all): 20; Yellow: 10 (individual)  Red (all): 20; Yellow: 10 (individual)    Metal gripper  35# 10x 35# 10 35# 10x NV? 35#  35# 30 50# 20   Pronation with supination during elbow flexion 3# 10; 4# 10; 5# 10x 4# 20  5# 10 4# 20  5# 10 4# 10  5# 10 NV? 4# 10  5# 2x10 4# 1x10  5# 2x10 4# 1x10  5# 2x10                         Tricep ext           Rows, Ext            Wrist flex, ext stretch   20 sec x 3 ea 20 sec x 3 ea Manually Manually extension stretch only 15 sec x 3 ea NV?    strengthening            No moneys            UBE           Supination stretch           Tricep stretch           Therastick wrist flexion and extension eccentric Red: 5 sec x 10 ea Red: 5 sec x 15 ea Red: 5 sec x 15 ea Red: 5 sec x 15 ea  Red: 5 sec x 15 ea Red: 5 sec x 20 ea Red: 5 sec x 20 ea Red: 5 sec x 20 ea   Therastick shakes AP, M/L Red: 30 sec x 1 ea (AP straight elbow and ML initial straight elbow then flexed elbow)  Red: 30 sec x 1 ea (straight arms)  Red: 30 sec x 1 ea (straight arms0  Red: 30 sec x 1 ea (straight arms)  NV? Red: 30 sec x 1 ea (straight arm) Red: 30 sec x 1 ea (straight arm) Red: 30 sec x 1 ea (straight arm - had to bend towards the end due to fatigue)   Supination and pronation 3# 30 3# 30 3# 30 3# 20; Hammer c 1.5# 10 ea 3# 20;  Hammer NV? 3# 30;  3# 30 4# 20   Supination and pronation with Hammer+weight       NV? NV?   Ther Activity            Dunaway carry       NV? NV?               Gait  Training                                   Modalities             5 mins 5 mins 5 mins 5 mins 15 mins 15 mins with there ex 10 mins with there ex 10 mins with there ex

## 2024-06-13 ENCOUNTER — OFFICE VISIT (OUTPATIENT)
Dept: PHYSICAL THERAPY | Facility: CLINIC | Age: 59
End: 2024-06-13
Payer: COMMERCIAL

## 2024-06-13 DIAGNOSIS — M25.521 RIGHT ELBOW PAIN: Primary | ICD-10-CM

## 2024-06-13 PROCEDURE — 97140 MANUAL THERAPY 1/> REGIONS: CPT | Performed by: PHYSICAL THERAPIST

## 2024-06-13 PROCEDURE — 97110 THERAPEUTIC EXERCISES: CPT | Performed by: PHYSICAL THERAPIST

## 2024-06-13 PROCEDURE — 97112 NEUROMUSCULAR REEDUCATION: CPT | Performed by: PHYSICAL THERAPIST

## 2024-06-13 NOTE — PROGRESS NOTES
Daily Note     Today's date: 2024  Patient name: Theresa Kim  : 1965  MRN: [id number]  Referring provider: Joselin Brumfield DO  Dx:   Encounter Diagnosis     ICD-10-CM    1. Right elbow pain  M25.521                      Subjective: patient reports that she does feel soreness post sessions however she is noticing improvements in strength.       Objective: See treatment diary below      Assessment: Tolerated treatment well; initiated POC with UBE for active warmup. Vcs provided on proper sequencing with exercises. MT performed after receiving consent from pt; she continues to have increased tone to extensor mass which improves with MT.  Patient demonstrated fatigue post treatment and would benefit from continued PT      Plan: Continue per plan of care.      Precautions: No past medical history on file. HTN        Access Code: [email or web address]  URL: [email or web address]  Date: 2024  Prepared by: Nancy Colby    Exercises  - Median Nerve Flossing - Tray  - 1 x daily - 7 x weekly - 2 sets - 10 reps  - Ulnar Nerve Flossing  - 1 x daily - 7 x weekly - 2 sets - 10 reps  - Seated Cervical Retraction and Extension  - 1 x daily - 7 x weekly - 3 sets - 10 reps  - Neck Retraction  - 1 x daily - 7 x weekly - 3 sets - 10 reps    Date 2/26 3/1 3/4 3/7 3/12 3/15         Visit # 1 2 3 4 5 6         FOTO done              Re-eval                     Manuals 6/13  5/20 5/23 5/28 6/3 6/6 6/10   PROM           Tspine           Elbow mobilization   SMF AP glides of RU jt SMF AP glides of RU jt SMF AP glides of RU jt SMF AP glides of RU jt NV    Taping Tricep                       Tricep IASTM           IASTM along extensor mass SMF ext mass focus  SMF SMF SMF ext and flexor SMF ext focused SMF ext mass focus SMF ext mass focus   Active release of Extensor mass SMF  SMF 10x SMF 10x NV SMF 10x NV? SMF   Cupping SMF along extensor mass (3 cups for 2 mins x 1) f/b sliding along extensor mass  SMF along  extensor mass (3 cups for 2 mins laterally and then medially followed sliding) SMF along extensor mass (3 cups for 2 mins laterally and then medially followed sliding) SMF along extensor mass (3 cups for 2 mins laterally and medially f/b sliding) SMF along extensor mass (3 cups for 2 mins) NV perform sliding SMF along extensor mass (3 cups for 1 min x 2) f/b sliding along extensor mass SMF along extensor msas (3 cups for 2 min x 1) f/b sliding along extensor mass   PA glides cervical            Cervical jt mobs                       Neuro Re-Ed           Rhythmic stab           Prone Ys, Ts, Is           Scap squeezes           Prone ext           Median nerve glides 2 sec; 2x10  2 sec; 2x10 2 sec; 2x10 NV? 2 sec; 2x10 2 sec; 2x10 2 sec; 2x10   Ulnar nerve glides           Radial N glides with head turn to the left 2 sec; 2x10  2x10 2x10 NV? 2 sec; 2x10 2 sec; 2x10 2 sec; 2x10   Cerv retract/ext repeated           SNAGs Cervical Extension           Seated thoracic ext           Standing Thoracic Extension with hands behind head with elbows sliding up wall           Bicep stretch using biodex 15 sec x 3   15 sec x 3     15 sec x 3 ea   Pec stretch in doorway           Elbow Flexion stretch c small towel           Seated thoracic extension stretch  (soccer ball)  2x10 (hands clasped and second round with hands behind head)  2x10 (hands clapsed and second round with hands behind head) 2x10 (hands clasped and second round with hands behind head) NV? 2x10 (hands clasped and second round with hands behind head) 2x10 (hands clasped and second round with hands behind head) 2x10 (hands clasped and second round with hands behind head)   Sideying Thoracic Rotation stretch           Cerivcal retraction           Cervical retraction to exntesion           Ther Ex           UBE 2 mins fwd/retro ea  NV 2 mins fwd/retro NV NV? 2 mins fwd/retro 2 mins fwd/retro ea   Wrist ext/flex DB 4# 20 ea (rolling weights on finger tips during  flexion)  3# 30 ea 3# 30 ea 3# 20 ea 3# 30 ea 3# 30 ea  4# 2x10 ea (rolling weights on finger tips during flexion)    Diggiflex (all, individual) Red (all): 20; Yellow: 10 (individual)  Red (all): 20; Yellow 8 (individual) Red (all): 20; Yellow 8 (individual)  Red (all): 20; Yellow 8 (individual) Red (all): 20; Yellow 10 (individual)  Red (all): 20; Yellow: 10 (individual)  Red (all): 20; Yellow: 10 (individual)    Metal gripper 50# 20  35# 10 35# 10x NV? 35#  35# 30 50# 20   Pronation with supination during elbow flexion 4# 1x10  5# 2x10  4# 20  5# 10 4# 10  5# 10 NV? 4# 10  5# 2x10 4# 1x10  5# 2x10 4# 1x10  5# 2x10                         Tricep ext           Rows, Ext            Wrist flex, ext stretch   20 sec x 3 ea 20 sec x 3 ea Manually Manually extension stretch only 15 sec x 3 ea NV?    strengthening            No moneys            UBE           Supination stretch           Tricep stretch           Therastick wrist flexion and extension eccentric Red: 5 sec x 20 ea  Red: 5 sec x 15 ea Red: 5 sec x 15 ea  Red: 5 sec x 15 ea Red: 5 sec x 20 ea Red: 5 sec x 20 ea Red: 5 sec x 20 ea   Therastick shakes AP, M/L Red: 30 sec x 1 ea (straight arm  Red: 30 sec x 1 ea (straight arms0  Red: 30 sec x 1 ea (straight arms)  NV? Red: 30 sec x 1 ea (straight arm) Red: 30 sec x 1 ea (straight arm) Red: 30 sec x 1 ea (straight arm - had to bend towards the end due to fatigue)   Supination and pronation 4# 20  3# 30 3# 20; Hammer c 1.5# 10 ea 3# 20;  Hammer NV? 3# 30;  3# 30 4# 20   Supination and pronation with Hammer+weight NV?      NV? NV?   Ther Activity            Dunaway carry NV?      NV? NV?               Gait Training                                   Modalities            MH 10 mins with there ex  5 mins 5 mins 15 mins 15 mins with there ex 10 mins with there ex 10 mins with there ex

## 2024-06-17 ENCOUNTER — OFFICE VISIT (OUTPATIENT)
Dept: PHYSICAL THERAPY | Facility: CLINIC | Age: 59
End: 2024-06-17
Payer: COMMERCIAL

## 2024-06-17 DIAGNOSIS — M25.521 RIGHT ELBOW PAIN: Primary | ICD-10-CM

## 2024-06-17 PROCEDURE — 97112 NEUROMUSCULAR REEDUCATION: CPT | Performed by: PHYSICAL THERAPIST

## 2024-06-17 PROCEDURE — 97140 MANUAL THERAPY 1/> REGIONS: CPT | Performed by: PHYSICAL THERAPIST

## 2024-06-17 PROCEDURE — 97110 THERAPEUTIC EXERCISES: CPT | Performed by: PHYSICAL THERAPIST

## 2024-06-17 NOTE — PROGRESS NOTES
Daily Note     Today's date: 2024  Patient name: Theresa Kim  : 1965  MRN: [id number]  Referring provider: Joselin Brumfield DO  Dx:   Encounter Diagnosis     ICD-10-CM    1. Right elbow pain  M25.521                      Subjective: Patient reports that she wrote a lot of Thank you cards and is sore today.       Objective: See treatment diary below      Assessment: Tolerated treatment well; initiated POC with UBE for active warmup. Vcs provided on proper sequencing with exercises; added RD/UD in forearm neutral. She has increased done with extensor mass more medially today which improves post TPR. Discussed posture awareness with mid back self TPR using a tennis ball and then posture correction with scap retraction.   Patient demonstrated fatigue post treatment and would benefit from continued PT      Plan: Continue per plan of care.      Precautions: No past medical history on file. HTN        Access Code: [email or web address]  URL: [email or web address]  Date: 2024  Prepared by: Nancy Colby    Exercises  - Median Nerve Flossing - Tray  - 1 x daily - 7 x weekly - 2 sets - 10 reps  - Ulnar Nerve Flossing  - 1 x daily - 7 x weekly - 2 sets - 10 reps  - Seated Cervical Retraction and Extension  - 1 x daily - 7 x weekly - 3 sets - 10 reps  - Neck Retraction  - 1 x daily - 7 x weekly - 3 sets - 10 reps    Date 2/26 3/1 3/4 3/7 3/12 3/15         Visit # 1 2 3 4 5 6         FOTO done              Re-eval                     Manuals 6/13 6/17   5/28 6/3 6/6 6/10   PROM           Tspine           Elbow mobilization     SMF AP glides of RU jt SMF AP glides of RU jt NV    Taping Tricep                       Tricep IASTM           IASTM along extensor mass SMF ext mass focus SMF extensor and flexor mass   SMF ext and flexor SMF ext focused SMF ext mass focus SMF ext mass focus   Active release of Extensor mass SMF SMf   NV SMF 10x NV? SMF   Cupping SMF along extensor mass (3 cups for 2 mins x  1) f/b sliding along extensor mass SMF along extensor mass (3 cups for approx 2 mins) - sliding NV   SMF along extensor mass (3 cups for 2 mins laterally and medially f/b sliding) SMF along extensor mass (3 cups for 2 mins) NV perform sliding SMF along extensor mass (3 cups for 1 min x 2) f/b sliding along extensor mass SMF along extensor msas (3 cups for 2 min x 1) f/b sliding along extensor mass   PA glides cervical            Cervical jt mobs                       Neuro Re-Ed           Rhythmic stab           Prone Ys, Ts, Is           Scap squeezes           Prone ext           Median nerve glides 2 sec; 2x10 2 sec x 20   NV? 2 sec; 2x10 2 sec; 2x10 2 sec; 2x10   Ulnar nerve glides           Radial N glides with head turn to the left 2 sec; 2x10 2 sec x 20   NV? 2 sec; 2x10 2 sec; 2x10 2 sec; 2x10   TB B Scap Retraction   GTB 2 sec x 20         TB B ' S Ext  GTB 2 sec x 20          Seated thoracic ext           Standing Thoracic Extension with hands behind head with elbows sliding up wall           Bicep stretch using biodex 15 sec x 3  15 sec x 3       15 sec x 3 ea   Pec stretch in doorway           Elbow Flexion stretch c small towel           Seated thoracic extension stretch  (soccer ball)  2x10 (hands clasped and second round with hands behind head) 2x10 (hands clasped and second round with hands behind head)    NV? 2x10 (hands clasped and second round with hands behind head) 2x10 (hands clasped and second round with hands behind head) 2x10 (hands clasped and second round with hands behind head)   Sideying Thoracic Rotation stretch           Cerivcal retraction           Cervical retraction to exntesion           Ther Ex           UBE 2 mins fwd/retro ea 2 mins fwd/retro ea   NV NV? 2 mins fwd/retro 2 mins fwd/retro ea   Wrist ext/flex DB 4# 20 ea (rolling weights on finger tips during flexion) 4# 30 ea (rolling weights on finger tips during flexion)   3# 20 ea 3# 30 ea 3# 30 ea  4# 2x10 ea (rolling  weights on finger tips during flexion)    Diggiflex (all, individual) Red (all): 20; Yellow: 10 (individual) Red (all): 20; Yellow 10 (individual)   Red (all): 20; Yellow 8 (individual) Red (all): 20; Yellow 10 (individual)  Red (all): 20; Yellow: 10 (individual)  Red (all): 20; Yellow: 10 (individual)    Metal gripper 50# 20 50#   NV? 35#  35# 30 50# 20   Pronation with supination during elbow flexion 4# 1x10  5# 2x10 5# 3x10   NV? 4# 10  5# 2x10 4# 1x10  5# 2x10 4# 1x10  5# 2x10                         Tricep ext           Rows, Ext            Wrist flex, ext stretch     Manually Manually extension stretch only 15 sec x 3 ea NV?    strengthening            No moneys            UBE           Supination stretch           Tricep stretch           Therastick wrist flexion and extension eccentric Red: 5 sec x 20 ea Red: 5 sec x 20 ea   Red: 5 sec x 15 ea Red: 5 sec x 20 ea Red: 5 sec x 20 ea Red: 5 sec x 20 ea   Therastick shakes AP, M/L Red: 30 sec x 1 ea (straight arm Red: 30 sec x 1 ea (straight arm)   NV? Red: 30 sec x 1 ea (straight arm) Red: 30 sec x 1 ea (straight arm) Red: 30 sec x 1 ea (straight arm - had to bend towards the end due to fatigue)   Supination and pronation 4# 20 4# 30   3# 20;  Hammer NV? 3# 30;  3# 30 4# 20   DB RD ecc  2# 20         Supination and pronation with Hammer+weight NV?      NV? NV?   Ther Activity            Dunaway carry NV?      NV? NV?               Gait Training                                   Modalities            MH 10 mins with there ex 10 mins with there ex   15 mins 15 mins with there ex 10 mins with there ex 10 mins with there ex

## 2024-06-20 ENCOUNTER — OFFICE VISIT (OUTPATIENT)
Dept: PHYSICAL THERAPY | Facility: CLINIC | Age: 59
End: 2024-06-20
Payer: COMMERCIAL

## 2024-06-20 DIAGNOSIS — M25.521 RIGHT ELBOW PAIN: Primary | ICD-10-CM

## 2024-06-20 PROCEDURE — 97110 THERAPEUTIC EXERCISES: CPT | Performed by: PHYSICAL THERAPIST

## 2024-06-20 PROCEDURE — 97140 MANUAL THERAPY 1/> REGIONS: CPT | Performed by: PHYSICAL THERAPIST

## 2024-06-20 NOTE — PROGRESS NOTES
Daily Note     Today's date: 2024  Patient name: Theresa Kim  : 1965  MRN: [id number]  Referring provider: Joselin Brumfield DO  Dx:   Encounter Diagnosis     ICD-10-CM    1. Right elbow pain  M25.521                      Subjective: patient reports that she has noticed improvements with her strength however was pretty sore after last visit.       Objective: See treatment diary below      Assessment: Tolerated treatment well; initiated POC with UBE for active warmup. Vcs provided on proper sequencing with exercises. MT performed after receiving consent from pt; she reports soreness in posterior aspect of elbow possibly due to prolonged elbow extension MT. Discussed modalities to use at home if ulnar nerve is still inflamed from prolonged position.  Patient demonstrated fatigue post treatment and would benefit from continued PT      Plan: Continue per plan of care.      Precautions: No past medical history on file. HTN        Access Code: [email or web address]  URL: [email or web address]  Date: 2024  Prepared by: Nancy Colby    Exercises  - Median Nerve Flossing - Tray  - 1 x daily - 7 x weekly - 2 sets - 10 reps  - Ulnar Nerve Flossing  - 1 x daily - 7 x weekly - 2 sets - 10 reps  - Seated Cervical Retraction and Extension  - 1 x daily - 7 x weekly - 3 sets - 10 reps  - Neck Retraction  - 1 x daily - 7 x weekly - 3 sets - 10 reps    Date 2/26 3/1 3/4 3/7 3/12 3/15         Visit # 1 2 3 4 5 6         FOTO done              Re-eval                     Manuals 6/13 6/17 6/20   6/3 6/6 6/10   PROM           Tspine           Elbow mobilization      SMF AP glides of RU jt NV    Taping Tricep                       Tricep IASTM           IASTM along extensor mass SMF ext mass focus SMF extensor and flexor mass SMF extensor and flexor mass   SMF ext focused SMF ext mass focus SMF ext mass focus   Active release of Extensor mass SMF SMf SMF 10x   SMF 10x NV? SMF   Cupping SMF along extensor  mass (3 cups for 2 mins x 1) f/b sliding along extensor mass SMF along extensor mass (3 cups for approx 2 mins) - sliding NV SMF along extensor mass f/b sliding    SMF along extensor mass (3 cups for 2 mins) NV perform sliding SMF along extensor mass (3 cups for 1 min x 2) f/b sliding along extensor mass SMF along extensor msas (3 cups for 2 min x 1) f/b sliding along extensor mass   PA glides cervical            Cervical jt mobs                       Neuro Re-Ed           Rhythmic stab           Prone Ys, Ts, Is           Scap squeezes           Prone ext           Median nerve glides 2 sec; 2x10 2 sec x 20 2 sec; 2x10    2 sec; 2x10 2 sec; 2x10 2 sec; 2x10   Ulnar nerve glides           Radial N glides with head turn to the left 2 sec; 2x10 2 sec x 20 2 sec; 2x10   2 sec; 2x10 2 sec; 2x10 2 sec; 2x10   TB B Scap Retraction   GTB 2 sec x 20 GTB 2 sec x 20        TB B ' S Ext  GTB 2 sec x 20  GTB 2 sec x 20        Seated thoracic ext           Standing Thoracic Extension with hands behind head with elbows sliding up wall           Bicep stretch using biodex 15 sec x 3  15 sec x 3       15 sec x 3 ea   Pec stretch in doorway           Elbow Flexion stretch c small towel           Seated thoracic extension stretch  (soccer ball)  2x10 (hands clasped and second round with hands behind head) 2x10 (hands clasped and second round with hands behind head)  2x10 (hands clasped and second round with hands behind head)   2x10 (hands clasped and second round with hands behind head) 2x10 (hands clasped and second round with hands behind head) 2x10 (hands clasped and second round with hands behind head)   Sideying Thoracic Rotation stretch           Cerivcal retraction           Cervical retraction to exntesion           Ther Ex           UBE 2 mins fwd/retro ea 2 mins fwd/retro ea 2 mins fwd/retro ea    NV? 2 mins fwd/retro 2 mins fwd/retro ea   Wrist ext/flex DB 4# 20 ea (rolling weights on finger tips during flexion) 4# 30 ea  (rolling weights on finger tips during flexion) 4# 30 ea (rolling weights on finger tips during flexion   3# 30 ea 3# 30 ea  4# 2x10 ea (rolling weights on finger tips during flexion)    Diggiflex (all, individual) Red (all): 20; Yellow: 10 (individual) Red (all): 20; Yellow 10 (individual) Red (all); 20; Yellow 10 (individual)   Red (all): 20; Yellow 10 (individual)  Red (all): 20; Yellow: 10 (individual)  Red (all): 20; Yellow: 10 (individual)    Metal gripper 50# 20 50# 50# 30   35#  35# 30 50# 20   Pronation with supination during elbow flexion 4# 1x10  5# 2x10 5# 3x10 NV?   4# 10  5# 2x10 4# 1x10  5# 2x10 4# 1x10  5# 2x10                         Tricep ext           Rows, Ext            Wrist flex, ext stretch      Manually extension stretch only 15 sec x 3 ea NV?    strengthening            No moneys            UBE           Supination stretch           Tricep stretch           Therastick wrist flexion and extension eccentric Red: 5 sec x 20 ea Red: 5 sec x 20 ea Red: 5 sec x 20 ea   Red: 5 sec x 20 ea Red: 5 sec x 20 ea Red: 5 sec x 20 ea   Therastick shakes AP, M/L Red: 30 sec x 1 ea (straight arm Red: 30 sec x 1 ea (straight arm) Red: 30 sec x 1 ea (slight bent for M/L)   Red: 30 sec x 1 ea (straight arm) Red: 30 sec x 1 ea (straight arm) Red: 30 sec x 1 ea (straight arm - had to bend towards the end due to fatigue)   Supination and pronation 4# 20 4# 30 4# 30   3# 30;  3# 30 4# 20   DB RD ecc  2# 20 2# 20        Supination and pronation with Hammer+weight NV?      NV? NV?   Ther Activity            Dunaway carry NV?      NV? NV?               Gait Training                                   Modalities            MH 10 mins with there ex 10 mins with there ex 10 mins with there ex and 10 mins post   15 mins with there ex 10 mins with there ex 10 mins with there ex

## 2024-06-24 ENCOUNTER — EVALUATION (OUTPATIENT)
Dept: PHYSICAL THERAPY | Facility: CLINIC | Age: 59
End: 2024-06-24
Payer: COMMERCIAL

## 2024-06-24 DIAGNOSIS — M25.521 RIGHT ELBOW PAIN: Primary | ICD-10-CM

## 2024-06-24 PROCEDURE — 97112 NEUROMUSCULAR REEDUCATION: CPT | Performed by: PHYSICAL THERAPIST

## 2024-06-24 PROCEDURE — 97140 MANUAL THERAPY 1/> REGIONS: CPT | Performed by: PHYSICAL THERAPIST

## 2024-06-24 NOTE — LETTER
2024    Joselin Brumfield DO  [address] [facility] [address]    Patient: Theresa Kim   YOB: 1965   Date of Visit: 2024     Encounter Diagnosis     ICD-10-CM    1. Right elbow pain  M25.521           Dear Dr. Brumfield:    Thank you for your recent referral of Theresa Kim. Please review the attached evaluation summary from Theresa's recent visit.     Please verify that you agree with the plan of care by signing the attached order.     If you have any questions or concerns, please do not hesitate to call.     I sincerely appreciate the opportunity to share in the care of one of your patients and hope to have another opportunity to work with you in the near future.       Sincerely,    Kassandra Mane, PT      Referring Provider:      I certify that I have read the below Plan of Care and certify the need for these services furnished under this plan of treatment while under my care.                    Joselin Brumfield DO  [address] [facility] [address]  Via Fax: [phone]          PT Evaluation     Today's date: 2024  Patient name: Theresa Kim  : 1965  MRN: [id number]  Referring provider: Joselin Brumfield DO  Dx:   Encounter Diagnosis     ICD-10-CM    1. Right elbow pain  M25.521               Assessment  Assessment details: Theresa is a pleasant 59 yo female presenting to OP PT secondary to Right elbow pain with insidious onset 1 year ago. She denies DALLIN, and reports no numbness/tingling. She reports having improvements with her strength in being able to hold a watering can approximately 2 gallons when she was not able to do prior.  Overall, she reports having at least a 75% improvement since I.E. Pt presents continued but improving wrist and elbow strength deficits, periscap strength deficits, and pain at end range elbow/flex ext. Pain is increased along triceps with palmation and increase posterior medial elbow pain with  valgus stress test. Pt would benefit from skilled PT to address stated deficits and improve tolerance to daily activiites and improve maximal function.  Impairments: impaired physical strength and pain with function    Symptom irritability: moderateUnderstanding of Dx/Px/POC: good   Prognosis: good    Goals  Pt will demonstrate [place] with progressive home exercise program to assist with PT carry over and prevent recurrence of symptoms in the future - Progressing  Pt will demonstrate 20% improvement in FOTO score to increase tolerance to functional return. - Progressing   Pt will demonstrate 20 deg improvement in shoulder ROM to increase tolerance to reaching overhead, behind neck, and behind back to increase ease with ADLs such dressing/bathing - Progressing   Pt will demonstrate 4+/5 in UE strength to allow for improved tolerance to lifting items overhead. - Progressing   Pt will demonstrate 4+/5 in periscap strength to improve posture in sitting. - Progressing       Plan  Patient would benefit from: PT eval and skilled physical therapy  Planned modality interventions: TENS, manual electrical stimulation, low level laser therapy, thermotherapy: hydrocollator packs and cryotherapy  Planned therapy interventions: IASTM, joint mobilization, manual therapy, massage, nerve gliding, patient education, neuromuscular re-education, stretching, strengthening, therapeutic activities and therapeutic exercise  Frequency: 2x week  Plan of Care beginning date: 6/24/2024  Plan of Care expiration date: 9/16/2024  Treatment plan discussed with: patient        Subjective Evaluation    History of Present Illness    RE: 6/24/24: Patient reports that she continues to have soreness in her arm and tightness especially when she overworks her arm such as being in the garden. Overall she does report that she has improved in her strength.       RE 4/4/24: Patient was away on vacation for two weeks and reports that her sxs did improve.  She did keep up with her nerve glide exercises. The pain has returned since returning to work and using the mouse. She has been at a constant 7/10 pain level this week.     Mechanism of injury: Theresa is a 59 yo female presenting to OP PT secondary to Right elbow pain with onset 1 year ago. Pt reports sx are aggravated with working on computer using mouse and with maintaining elbow in end range positions. She reports weakness with carrying items, working overhead, and completing yard work. She reports some relief with Advil, massage, and compression stretch.          Recurrent probem    Quality of life: good    Patient Goals  Patient goals for therapy: increased strength  Patient goal: Stronger and less pain;  Pain  Current pain ratin  At best pain ratin  At worst pain ratin  Location: Elbow  Quality: dull ache  Aggravating factors: lifting, sitting, standing and stair climbing, opening jars    Social Support  Stairs in house: yes   Lives in: multiple-level home  Lives with: significant other    Employment status: working  Hand dominance: right      Diagnostic Tests  X-ray: normal  Treatments  No previous or current treatments        Objective     Observations     Right Elbow   Negative for edema, effusion and incisions.     Palpation     Right   Tenderness of the triceps and wrist flexors.     Active Range of Motion     Right Elbow   Normal active range of motion  Elbow hyperextension  Extension: with pain    CROM:  Flexion: TBA  Extension: TBA  Rotation: (R) TBA (L) TBA  Lat: (R) TBA (L) TBA    Passive Range of Motion     Right Elbow   Normal passive range of motion  Extension: with pain    Joint Play     Right Elbow   Joints within functional limits are the humeroulnar joint, humeroradial joint and proximal radioulnar joint.     Strength/Myotome Testing     Right Elbow   Flexion: 4  Extension: 4  Forearm supination: 4  Forearm pronation: 4    Right Wrist/Hand   Wrist extension: 4  Wrist flexion:  4-  Radial deviation: 3+  Ulnar deviation: 4-    UT: (R) 3+/5 (L) 4-/5  MT: (T) 4-/5 (L) 4-/5  LT: (R) 3/5 (L) 3/5        Tests     Right Elbow   Positive active medial epicondylitis, moving valgus stress, passive medial epicondylitis and valgus stress at 30 degrees.   Negative Cozen's, elbow flexion, handshake, milking maneuver, pronator compression, radial tunnel and valgus stress at 0 degrees.       Diagnosis:  Right elbow pain   Precautions:  HTN   Comparable signs 1) End range elbow extension   2) pain with wrist flexion  3)    Primary Impairments: 1)  Right wrist and elbow weakness  2) Right periscap strength deficits   Patient Goals Work at her computer without discomfort               Precautions: No past medical history on file. HTN    Exercises  - Median Nerve Flossing - Tray  - 1 x daily - 7 x weekly - 2 sets - 10 reps  - Ulnar Nerve Flossing  - 1 x daily - 7 x weekly - 2 sets - 10 reps  - Seated Cervical Retraction and Extension  - 1 x daily - 7 x weekly - 3 sets - 10 reps  - Neck Retraction  - 1 x daily - 7 x weekly - 3 sets - 10 reps      Date 2/26 3/1 3/4 3/7 3/12 3/15         Visit # 1 2 3 4 5 6         FOTO done              Re-eval                     Manuals 6/13 6/17 6/20 6/24  6/3 6/6 6/10   PROM           Tspine           Elbow mobilization      SMF AP glides of RU jt NV    Taping Tricep                       Tricep IASTM           IASTM along extensor mass SMF ext mass focus SMF extensor and flexor mass SMF extensor and flexor mass SMF extensor and flexor mass  SMF ext focused SMF ext mass focus SMF ext mass focus   Active release of Extensor mass SMF SMf SMF 10x SMF 10x  SMF 10x NV? SMF   Cupping SMF along extensor mass (3 cups for 2 mins x 1) f/b sliding along extensor mass SMF along extensor mass (3 cups for approx 2 mins) - sliding NV SMF along extensor mass f/b sliding  SMF along extensor mass f/b sliding  SMF along extensor mass (3 cups for 2 mins) NV perform sliding SMF along extensor  mass (3 cups for 1 min x 2) f/b sliding along extensor mass SMF along extensor msas (3 cups for 2 min x 1) f/b sliding along extensor mass   PA glides cervical            Cervical jt mobs                       Neuro Re-Ed           Rhythmic stab           Prone Ys, Ts, Is           Scap squeezes           Prone ext           Median nerve glides 2 sec; 2x10 2 sec x 20 2 sec; 2x10  With side bend away: 2x10  2 sec; 2x10 2 sec; 2x10 2 sec; 2x10   Ulnar nerve glides           Radial N glides with head turn to the left 2 sec; 2x10 2 sec x 20 2 sec; 2x10 With side bend away: 2x10  2 sec; 2x10 2 sec; 2x10 2 sec; 2x10   TB B Scap Retraction   GTB 2 sec x 20 GTB 2 sec x 20 GTB 2 sec; 2x15       TB B ' S Ext  GTB 2 sec x 20  GTB 2 sec x 20 GTB 2 sec; 2x15       Seated thoracic ext           Standing Thoracic Extension with hands behind head with elbows sliding up wall           Bicep stretch using biodex 15 sec x 3  15 sec x 3   15 sec x 3    15 sec x 3 ea   Pec stretch in doorway           Elbow Flexion stretch c small towel           Seated thoracic extension stretch  (soccer ball)  2x10 (hands clasped and second round with hands behind head) 2x10 (hands clasped and second round with hands behind head)  2x10 (hands clasped and second round with hands behind head) 2x10 (hands clasped and second around with hands behind head)  2x10 (hands clasped and second round with hands behind head) 2x10 (hands clasped and second round with hands behind head) 2x10 (hands clasped and second round with hands behind head)   Sideying Thoracic Rotation stretch           Cerivcal retraction           Cervical retraction to exntesion           Ther Ex           UBE 2 mins fwd/retro ea 2 mins fwd/retro ea 2 mins fwd/retro ea  2 mins fwd/retro  NV? 2 mins fwd/retro 2 mins fwd/retro ea   Wrist ext/flex DB 4# 20 ea (rolling weights on finger tips during flexion) 4# 30 ea (rolling weights on finger tips during flexion) 4# 30 ea (rolling weights on  finger tips during flexion 4# 30 ea (rolling weights on finger tips during flexion)  3# 30 ea 3# 30 ea  4# 2x10 ea (rolling weights on finger tips during flexion)    Diggiflex (all, individual) Red (all): 20; Yellow: 10 (individual) Red (all): 20; Yellow 10 (individual) Red (all); 20; Yellow 10 (individual) Red (all): 20; yellow: 10 (individual)  Red (all): 20; Yellow 10 (individual)  Red (all): 20; Yellow: 10 (individual)  Red (all): 20; Yellow: 10 (individual)    Metal gripper 50# 20 50# 50# 30 5# 30  35#  35# 30 50# 20   Pronation with supination during elbow flexion 4# 1x10  5# 2x10 5# 3x10 NV? 5# 30  4# 10  5# 2x10 4# 1x10  5# 2x10 4# 1x10  5# 2x10                         Tricep ext           Rows, Ext            Wrist flex, ext stretch      Manually extension stretch only 15 sec x 3 ea NV?    strengthening            No moneys            UBE           Supination stretch           Tricep stretch           Therastick wrist flexion and extension eccentric Red: 5 sec x 20 ea Red: 5 sec x 20 ea Red: 5 sec x 20 ea Red: 5 sec x 20 ea  Red: 5 sec x 20 ea Red: 5 sec x 20 ea Red: 5 sec x 20 ea   Therastick shakes AP, M/L Red: 30 sec x 1 ea (straight arm Red: 30 sec x 1 ea (straight arm) Red: 30 sec x 1 ea (slight bent for M/L) Red: 30 sec x 1 ea (straight arm ea)  Red: 30 sec x 1 ea (straight arm) Red: 30 sec x 1 ea (straight arm) Red: 30 sec x 1 ea (straight arm - had to bend towards the end due to fatigue)   Supination and pronation 4# 20 4# 30 4# 30 4# 30  3# 30;  3# 30 4# 20   DB RD ecc  2# 20 2# 20 2# 30       Supination and pronation with Hammer+weight NV?      NV? NV?   Ther Activity            Dunaway carry NV?      NV? NV?               Gait Training                                   Modalities            MH 10 mins with there ex 10 mins with there ex 10 mins with there ex and 10 mins post 10 mins with there ex   15 mins with there ex 10 mins with there ex 10 mins with there ex

## 2024-06-24 NOTE — PROGRESS NOTES
PT Evaluation     Today's date: 2024  Patient name: Theresa Kim  : 1965  MRN: [id number]  Referring provider: Joselin Brumfield DO  Dx:   Encounter Diagnosis     ICD-10-CM    1. Right elbow pain  M25.521               Assessment  Assessment details: Theresa is a pleasant 59 yo female presenting to OP PT secondary to Right elbow pain with insidious onset 1 year ago. She denies DALLIN, and reports no numbness/tingling. She reports having improvements with her strength in being able to hold a watering can approximately 2 gallons when she was not able to do prior.  Overall, she reports having at least a 75% improvement since I.E. Pt presents continued but improving wrist and elbow strength deficits, periscap strength deficits, and pain at end range elbow/flex ext. Pain is increased along triceps with palmation and increase posterior medial elbow pain with valgus stress test. Pt would benefit from skilled PT to address stated deficits and improve tolerance to daily activiites and improve maximal function.  Impairments: impaired physical strength and pain with function    Symptom irritability: moderateUnderstanding of Dx/Px/POC: good   Prognosis: good    Goals  Pt will demonstrate Daniels with progressive home exercise program to assist with PT carry over and prevent recurrence of symptoms in the future - Progressing  Pt will demonstrate 20% improvement in FOTO score to increase tolerance to functional return. - Progressing   Pt will demonstrate 20 deg improvement in shoulder ROM to increase tolerance to reaching overhead, behind neck, and behind back to increase ease with ADLs such dressing/bathing - Progressing   Pt will demonstrate 4+/5 in UE strength to allow for improved tolerance to lifting items overhead. - Progressing   Pt will demonstrate 4+/5 in periscap strength to improve posture in sitting. - Progressing       Plan  Patient would benefit from: PT eval and skilled physical  therapy  Planned modality interventions: TENS, manual electrical stimulation, low level laser therapy, thermotherapy: hydrocollator packs and cryotherapy  Planned therapy interventions: IASTM, joint mobilization, manual therapy, massage, nerve gliding, patient education, neuromuscular re-education, stretching, strengthening, therapeutic activities and therapeutic exercise  Frequency: 2x week  Plan of Care beginning date: 2024  Plan of Care expiration date: 2024  Treatment plan discussed with: patient        Subjective Evaluation    History of Present Illness    RE: 24: Patient reports that she continues to have soreness in her arm and tightness especially when she overworks her arm such as being in the garden. Overall she does report that she has improved in her strength.       RE 24: Patient was away on vacation for two weeks and reports that her sxs did improve. She did keep up with her nerve glide exercises. The pain has returned since returning to work and using the mouse. She has been at a constant 7/10 pain level this week.     Mechanism of injury: Theresa is a 57 yo female presenting to OP PT secondary to Right elbow pain with onset 1 year ago. Pt reports sx are aggravated with working on computer using mouse and with maintaining elbow in end range positions. She reports weakness with carrying items, working overhead, and completing yard work. She reports some relief with Advil, massage, and compression stretch.          Recurrent probem    Quality of life: good    Patient Goals  Patient goals for therapy: increased strength  Patient goal: Stronger and less pain;  Pain  Current pain ratin  At best pain ratin  At worst pain ratin  Location: Elbow  Quality: dull ache  Aggravating factors: lifting, sitting, standing and stair climbing, opening jars    Social Support  Stairs in house: yes   Lives in: multiple-level home  Lives with: significant other    Employment status:  working  Hand dominance: right      Diagnostic Tests  X-ray: normal  Treatments  No previous or current treatments        Objective     Observations     Right Elbow   Negative for edema, effusion and incisions.     Palpation     Right   Tenderness of the triceps and wrist flexors.     Active Range of Motion     Right Elbow   Normal active range of motion  Elbow hyperextension  Extension: with pain    CROM:  Flexion: TBA  Extension: TBA  Rotation: (R) TBA (L) TBA  Lat: (R) TBA (L) TBA    Passive Range of Motion     Right Elbow   Normal passive range of motion  Extension: with pain    Joint Play     Right Elbow   Joints within functional limits are the humeroulnar joint, humeroradial joint and proximal radioulnar joint.     Strength/Myotome Testing     Right Elbow   Flexion: 4  Extension: 4  Forearm supination: 4  Forearm pronation: 4    Right Wrist/Hand   Wrist extension: 4  Wrist flexion: 4-  Radial deviation: 3+  Ulnar deviation: 4-    UT: (R) 3+/5 (L) 4-/5  MT: (T) 4-/5 (L) 4-/5  LT: (R) 3/5 (L) 3/5        Tests     Right Elbow   Positive active medial epicondylitis, moving valgus stress, passive medial epicondylitis and valgus stress at 30 degrees.   Negative Cozen's, elbow flexion, handshake, milking maneuver, pronator compression, radial tunnel and valgus stress at 0 degrees.       Diagnosis:  Right elbow pain   Precautions:  HTN   Comparable signs 1) End range elbow extension   2) pain with wrist flexion  3)    Primary Impairments: 1)  Right wrist and elbow weakness  2) Right periscap strength deficits   Patient Goals Work at her computer without discomfort               Precautions: No past medical history on file. HTN    Exercises  - Median Nerve Flossing - Tray  - 1 x daily - 7 x weekly - 2 sets - 10 reps  - Ulnar Nerve Flossing  - 1 x daily - 7 x weekly - 2 sets - 10 reps  - Seated Cervical Retraction and Extension  - 1 x daily - 7 x weekly - 3 sets - 10 reps  - Neck Retraction  - 1 x daily - 7 x weekly -  3 sets - 10 reps      Date 2/26 3/1 3/4 3/7 3/12 3/15         Visit # 1 2 3 4 5 6         FOTO done              Re-eval                     Manuals 6/13 6/17 6/20 6/24  6/3 6/6 6/10   PROM           Tspine           Elbow mobilization      SMF AP glides of RU jt NV    Taping Tricep                       Tricep IASTM           IASTM along extensor mass SMF ext mass focus SMF extensor and flexor mass SMF extensor and flexor mass SMF extensor and flexor mass  SMF ext focused SMF ext mass focus SMF ext mass focus   Active release of Extensor mass SMF SMf SMF 10x SMF 10x  SMF 10x NV? SMF   Cupping SMF along extensor mass (3 cups for 2 mins x 1) f/b sliding along extensor mass SMF along extensor mass (3 cups for approx 2 mins) - sliding NV SMF along extensor mass f/b sliding  SMF along extensor mass f/b sliding  SMF along extensor mass (3 cups for 2 mins) NV perform sliding SMF along extensor mass (3 cups for 1 min x 2) f/b sliding along extensor mass SMF along extensor msas (3 cups for 2 min x 1) f/b sliding along extensor mass   PA glides cervical            Cervical jt mobs                       Neuro Re-Ed           Rhythmic stab           Prone Ys, Ts, Is           Scap squeezes           Prone ext           Median nerve glides 2 sec; 2x10 2 sec x 20 2 sec; 2x10  With side bend away: 2x10  2 sec; 2x10 2 sec; 2x10 2 sec; 2x10   Ulnar nerve glides           Radial N glides with head turn to the left 2 sec; 2x10 2 sec x 20 2 sec; 2x10 With side bend away: 2x10  2 sec; 2x10 2 sec; 2x10 2 sec; 2x10   TB B Scap Retraction   GTB 2 sec x 20 GTB 2 sec x 20 GTB 2 sec; 2x15       TB B ' S Ext  GTB 2 sec x 20  GTB 2 sec x 20 GTB 2 sec; 2x15       Seated thoracic ext           Standing Thoracic Extension with hands behind head with elbows sliding up wall           Bicep stretch using biodex 15 sec x 3  15 sec x 3   15 sec x 3    15 sec x 3 ea   Pec stretch in doorway           Elbow Flexion stretch c small towel           Seated  thoracic extension stretch  (soccer ball)  2x10 (hands clasped and second round with hands behind head) 2x10 (hands clasped and second round with hands behind head)  2x10 (hands clasped and second round with hands behind head) 2x10 (hands clasped and second around with hands behind head)  2x10 (hands clasped and second round with hands behind head) 2x10 (hands clasped and second round with hands behind head) 2x10 (hands clasped and second round with hands behind head)   Sideying Thoracic Rotation stretch           Cerivcal retraction           Cervical retraction to exntesion           Ther Ex           UBE 2 mins fwd/retro ea 2 mins fwd/retro ea 2 mins fwd/retro ea  2 mins fwd/retro  NV? 2 mins fwd/retro 2 mins fwd/retro ea   Wrist ext/flex DB 4# 20 ea (rolling weights on finger tips during flexion) 4# 30 ea (rolling weights on finger tips during flexion) 4# 30 ea (rolling weights on finger tips during flexion 4# 30 ea (rolling weights on finger tips during flexion)  3# 30 ea 3# 30 ea  4# 2x10 ea (rolling weights on finger tips during flexion)    Diggiflex (all, individual) Red (all): 20; Yellow: 10 (individual) Red (all): 20; Yellow 10 (individual) Red (all); 20; Yellow 10 (individual) Red (all): 20; yellow: 10 (individual)  Red (all): 20; Yellow 10 (individual)  Red (all): 20; Yellow: 10 (individual)  Red (all): 20; Yellow: 10 (individual)    Metal gripper 50# 20 50# 50# 30 5# 30  35#  35# 30 50# 20   Pronation with supination during elbow flexion 4# 1x10  5# 2x10 5# 3x10 NV? 5# 30  4# 10  5# 2x10 4# 1x10  5# 2x10 4# 1x10  5# 2x10                         Tricep ext           Rows, Ext            Wrist flex, ext stretch      Manually extension stretch only 15 sec x 3 ea NV?    strengthening            No moneys            UBE           Supination stretch           Tricep stretch           Therastick wrist flexion and extension eccentric Red: 5 sec x 20 ea Red: 5 sec x 20 ea Red: 5 sec x 20 ea Red: 5 sec x 20  ea  Red: 5 sec x 20 ea Red: 5 sec x 20 ea Red: 5 sec x 20 ea   Therastick shakes AP, M/L Red: 30 sec x 1 ea (straight arm Red: 30 sec x 1 ea (straight arm) Red: 30 sec x 1 ea (slight bent for M/L) Red: 30 sec x 1 ea (straight arm ea)  Red: 30 sec x 1 ea (straight arm) Red: 30 sec x 1 ea (straight arm) Red: 30 sec x 1 ea (straight arm - had to bend towards the end due to fatigue)   Supination and pronation 4# 20 4# 30 4# 30 4# 30  3# 30;  3# 30 4# 20   DB RD ecc  2# 20 2# 20 2# 30       Supination and pronation with Hammer+weight NV?      NV? NV?   Ther Activity            Dunaway carry NV?      NV? NV?               Gait Training                                   Modalities            MH 10 mins with there ex 10 mins with there ex 10 mins with there ex and 10 mins post 10 mins with there ex   15 mins with there ex 10 mins with there ex 10 mins with there ex

## 2024-06-27 ENCOUNTER — OFFICE VISIT (OUTPATIENT)
Dept: PHYSICAL THERAPY | Facility: CLINIC | Age: 59
End: 2024-06-27
Payer: COMMERCIAL

## 2024-06-27 DIAGNOSIS — M25.521 RIGHT ELBOW PAIN: Primary | ICD-10-CM

## 2024-06-27 PROCEDURE — 97112 NEUROMUSCULAR REEDUCATION: CPT | Performed by: PHYSICAL THERAPIST

## 2024-06-27 PROCEDURE — 97140 MANUAL THERAPY 1/> REGIONS: CPT | Performed by: PHYSICAL THERAPIST

## 2024-06-27 NOTE — PROGRESS NOTES
Daily Note     Today's date: 2024  Patient name: Theresa Kim  : 1965  MRN: [id number]  Referring provider: Joselin Brumfield DO  Dx:   Encounter Diagnosis     ICD-10-CM    1. Right elbow pain  M25.521                      Subjective: Patient reports that she feels alright tonight.       Objective: See treatment diary below      Assessment: Tolerated treatment well; initiated POC with UBE for active warmup. MH applied for there ex. Vcs provided on proper sequencing with exercises; added garg carry to progress  endurance as well as postural awareness. She reports fatigue. MT performed after receiving consent from pt.  Patient demonstrated fatigue post treatment and would benefit from continued PT      Plan: Continue per plan of care.      Precautions: No past medical history on file. HTN        Access Code: [email or web address]  URL: [email or web address]  Date: 2024  Prepared by: Nancy Colby    Exercises  - Median Nerve Flossing - Tray  - 1 x daily - 7 x weekly - 2 sets - 10 reps  - Ulnar Nerve Flossing  - 1 x daily - 7 x weekly - 2 sets - 10 reps  - Seated Cervical Retraction and Extension  - 1 x daily - 7 x weekly - 3 sets - 10 reps  - Neck Retraction  - 1 x daily - 7 x weekly - 3 sets - 10 reps          Date 2/26 3/1 3/4 3/7 3/12 3/15         Visit # 1 2 3 4 5 6         FOTO done              Re-eval                     Manuals 6/13 6/17 6/20 6/24 6/27  6/6 6/10   PROM           Tspine           Elbow mobilization       NV    Taping Tricep                       Tricep IASTM           IASTM along extensor mass SMF ext mass focus SMF extensor and flexor mass SMF extensor and flexor mass SMF extensor and flexor mass SMF extensor mass  SMF ext mass focus SMF ext mass focus   Active release of Extensor mass SMF SMf SMF 10x SMF 10x SMF 10x  NV? SMF   Cupping SMF along extensor mass (3 cups for 2 mins x 1) f/b sliding along extensor mass SMF along extensor mass (3 cups for approx 2  mins) - sliding NV SMF along extensor mass f/b sliding  SMF along extensor mass f/b sliding SMF along extensor mass f/b sliding  SMF along extensor mass (3 cups for 1 min x 2) f/b sliding along extensor mass SMF along extensor msas (3 cups for 2 min x 1) f/b sliding along extensor mass   PA glides cervical            Cervical jt mobs                       Neuro Re-Ed           Rhythmic stab           Prone Ys, Ts, Is           Scap squeezes           Prone ext           Median nerve glides 2 sec; 2x10 2 sec x 20 2 sec; 2x10  With side bend away: 2x10 NV?  2 sec; 2x10 2 sec; 2x10   Ulnar nerve glides           Radial N glides with head turn to the left 2 sec; 2x10 2 sec x 20 2 sec; 2x10 With side bend away: 2x10 NV?  2 sec; 2x10 2 sec; 2x10   TB B Scap Retraction   GTB 2 sec x 20 GTB 2 sec x 20 GTB 2 sec; 2x15 GTB 2 sec; 2x15      TB B ' S Ext  GTB 2 sec x 20  GTB 2 sec x 20 GTB 2 sec; 2x15 GTB 2 sec; 2x15      Seated thoracic ext           Standing Thoracic Extension with hands behind head with elbows sliding up wall           Bicep stretch using biodex 15 sec x 3  15 sec x 3   15 sec x 3    15 sec x 3 ea   Pec stretch in doorway           Elbow Flexion stretch c small towel           Seated thoracic extension stretch  (soccer ball)  2x10 (hands clasped and second round with hands behind head) 2x10 (hands clasped and second round with hands behind head)  2x10 (hands clasped and second round with hands behind head) 2x10 (hands clasped and second around with hands behind head) 2x10 (hands clasped and second round with hands behind head)   2x10 (hands clasped and second round with hands behind head) 2x10 (hands clasped and second round with hands behind head)   Sideying Thoracic Rotation stretch           Cerivcal retraction           Cervical retraction to exntesion           Ther Ex           UBE 2 mins fwd/retro ea 2 mins fwd/retro ea 2 mins fwd/retro ea  2 mins fwd/retro 2 mins fwd/retro  2 mins fwd/retro 2 mins  fwd/retro ea   Wrist ext/flex DB 4# 20 ea (rolling weights on finger tips during flexion) 4# 30 ea (rolling weights on finger tips during flexion) 4# 30 ea (rolling weights on finger tips during flexion 4# 30 ea (rolling weights on finger tips during flexion) 4# 30 ea (rolling weights on finger tips during flexion)  3# 30 ea  4# 2x10 ea (rolling weights on finger tips during flexion)    Diggiflex (all, individual) Red (all): 20; Yellow: 10 (individual) Red (all): 20; Yellow 10 (individual) Red (all); 20; Yellow 10 (individual) Red (all): 20; yellow: 10 (individual) Red (all); 20; yellow (individual) 10  Red (all): 20; Yellow: 10 (individual)  Red (all): 20; Yellow: 10 (individual)    Metal gripper 50# 20 50# 50# 30 5# 30 50# 30  35# 30 50# 20   Pronation with supination during elbow flexion 4# 1x10  5# 2x10 5# 3x10 NV? 5# 30 5# 30  4# 1x10  5# 2x10 4# 1x10  5# 2x10                         Tricep ext           Rows, Ext            Wrist flex, ext stretch     20 sec x 3 ea  15 sec x 3 ea NV?    strengthening            No moneys            UBE           Supination stretch           Tricep stretch           Therastick wrist flexion and extension eccentric Red: 5 sec x 20 ea Red: 5 sec x 20 ea Red: 5 sec x 20 ea Red: 5 sec x 20 ea Red: 5 sec x 20 ea  Red: 5 sec x 20 ea Red: 5 sec x 20 ea   Therastick shakes AP, M/L Red: 30 sec x 1 ea (straight arm Red: 30 sec x 1 ea (straight arm) Red: 30 sec x 1 ea (slight bent for M/L) Red: 30 sec x 1 ea (straight arm ea) Red: 30 secx  1 ea (straight arm ea)  Red: 30 sec x 1 ea (straight arm) Red: 30 sec x 1 ea (straight arm - had to bend towards the end due to fatigue)   Supination and pronation 4# 20 4# 30 4# 30 4# 30 4# 30  3# 30 4# 20   DB RD ecc  2# 20 2# 20 2# 30 2# 2x15      Supination and pronation with Hammer+weight NV?      NV? NV?   Ther Activity            Dunaway carry with slight elevation of shoulders and scap retraction NV?    7# DBs 3 laps  NV? NV?                Gait Training                                   Modalities            MH 10 mins with there ex 10 mins with there ex 10 mins with there ex and 10 mins post 10 mins with there ex  10 mins with there ex  10 mins with there ex 10 mins with there ex

## 2024-07-01 ENCOUNTER — OFFICE VISIT (OUTPATIENT)
Dept: PHYSICAL THERAPY | Facility: CLINIC | Age: 59
End: 2024-07-01
Payer: COMMERCIAL

## 2024-07-01 DIAGNOSIS — M25.521 RIGHT ELBOW PAIN: Primary | ICD-10-CM

## 2024-07-01 PROCEDURE — 97112 NEUROMUSCULAR REEDUCATION: CPT | Performed by: PHYSICAL THERAPIST

## 2024-07-01 PROCEDURE — 97110 THERAPEUTIC EXERCISES: CPT | Performed by: PHYSICAL THERAPIST

## 2024-07-01 PROCEDURE — 97140 MANUAL THERAPY 1/> REGIONS: CPT | Performed by: PHYSICAL THERAPIST

## 2024-07-01 NOTE — PROGRESS NOTES
Daily Note     Today's date: 2024  Patient name: Theresa Kim  : 1965  MRN: [id number]  Referring provider: Joselin Brumfield DO  Dx:   Encounter Diagnosis     ICD-10-CM    1. Right elbow pain  M25.521                      Subjective: patient reports that she feels a little tired today.       Objective: See treatment diary below      Assessment: Tolerated treatment well; initiated POC with UBE for active warmup f/b MH and there ex. Vcs provided on proper sequencing with exercises; she continues to demonstrate improving tolerance with exercises. MT performed after receiving consent from pt; she continues to have tightness along extensor mass which improves post MT.   Patient demonstrated fatigue post treatment and would benefit from continued PT      Plan: Continue per plan of care.      Precautions: No past medical history on file. HTN        Access Code: [email or web address]  URL: [email or web address]  Date: 2024  Prepared by: Nancy Colby    Exercises  - Median Nerve Flossing - Tray  - 1 x daily - 7 x weekly - 2 sets - 10 reps  - Ulnar Nerve Flossing  - 1 x daily - 7 x weekly - 2 sets - 10 reps  - Seated Cervical Retraction and Extension  - 1 x daily - 7 x weekly - 3 sets - 10 reps  - Neck Retraction  - 1 x daily - 7 x weekly - 3 sets - 10 reps          Date 2/26 3/1 3/4 3/7 3/12 3/15         Visit # 1 2 3 4 5 6         FOTO done              Re-eval                     Manuals 6/13 6/17 6/20 6/24 6/27 7/1  6/10   PROM           Tspine           Elbow mobilization           Taping Tricep                       Tricep IASTM           IASTM along extensor mass SMF ext mass focus SMF extensor and flexor mass SMF extensor and flexor mass SMF extensor and flexor mass SMF extensor mass SMF extensor mass  SMF ext mass focus   Active release of Extensor mass SMF SMf SMF 10x SMF 10x SMF 10x NV?  SMF   Cupping SMF along extensor mass (3 cups for 2 mins x 1) f/b sliding along extensor mass SMF  along extensor mass (3 cups for approx 2 mins) - sliding NV SMF along extensor mass f/b sliding  SMF along extensor mass f/b sliding SMF along extensor mass f/b sliding SMF along extensor mass f/b sliding  SMF along extensor msas (3 cups for 2 min x 1) f/b sliding along extensor mass   PA glides cervical            Cervical jt mobs                       Neuro Re-Ed           Rhythmic stab           Prone Ys, Ts, Is           Scap squeezes           Prone ext           Median nerve glides 2 sec; 2x10 2 sec x 20 2 sec; 2x10  With side bend away: 2x10 NV? With side bend away: 2x10  2 sec; 2x10   Ulnar nerve glides           Radial N glides with head turn to the left 2 sec; 2x10 2 sec x 20 2 sec; 2x10 With side bend away: 2x10 NV? With side bend away: 2x10  2 sec; 2x10   TB B Scap Retraction   GTB 2 sec x 20 GTB 2 sec x 20 GTB 2 sec; 2x15 GTB 2 sec; 2x15 GTB 2 sec; 2x15     TB B ' S Ext  GTB 2 sec x 20  GTB 2 sec x 20 GTB 2 sec; 2x15 GTB 2 sec; 2x15 GTB 2 sec; 2x15     Seated thoracic ext           Standing Thoracic Extension with hands behind head with elbows sliding up wall           Bicep stretch using biodex 15 sec x 3  15 sec x 3   15 sec x 3    15 sec x 3 ea   Pec stretch in doorway           Elbow Flexion stretch c small towel           Seated thoracic extension stretch  (soccer ball)  2x10 (hands clasped and second round with hands behind head) 2x10 (hands clasped and second round with hands behind head)  2x10 (hands clasped and second round with hands behind head) 2x10 (hands clasped and second around with hands behind head) 2x10 (hands clasped and second round with hands behind head)  2x10 (hands clasped and second round with hands behind head)  2x10 (hands clasped and second round with hands behind head)   Sideying Thoracic Rotation stretch           Cerivcal retraction           Cervical retraction to exntesion           Ther Ex           UBE 2 mins fwd/retro ea 2 mins fwd/retro ea 2 mins fwd/retro ea  2  mins fwd/retro 2 mins fwd/retro 2 mins fwd/retro  2 mins fwd/retro ea   Wrist ext/flex DB 4# 20 ea (rolling weights on finger tips during flexion) 4# 30 ea (rolling weights on finger tips during flexion) 4# 30 ea (rolling weights on finger tips during flexion 4# 30 ea (rolling weights on finger tips during flexion) 4# 30 ea (rolling weights on finger tips during flexion) 4# 30 ea (rolling weights on finger tips during flexion)  4# 2x10 ea (rolling weights on finger tips during flexion)    Diggiflex (all, individual) Red (all): 20; Yellow: 10 (individual) Red (all): 20; Yellow 10 (individual) Red (all); 20; Yellow 10 (individual) Red (all): 20; yellow: 10 (individual) Red (all); 20; yellow (individual) 10 Red (all): 30; Yellow individual) 10  Red (all): 20; Yellow: 10 (individual)    Metal gripper 50# 20 50# 50# 30 5# 30 50# 30 50# 30  50# 20   Pronation with supination during elbow flexion 4# 1x10  5# 2x10 5# 3x10 NV? 5# 30 5# 30 5# 30  4# 1x10  5# 2x10                         Tricep ext           Rows, Ext            Wrist flex, ext stretch     20 sec x 3 ea NV?  NV?    strengthening            No moneys            UBE           Supination stretch           Tricep stretch           Therastick wrist flexion and extension eccentric Red: 5 sec x 20 ea Red: 5 sec x 20 ea Red: 5 sec x 20 ea Red: 5 sec x 20 ea Red: 5 sec x 20 ea Red: 5 sec x 20 ea  Red: 5 sec x 20 ea   Therastick shakes AP, M/L Red: 30 sec x 1 ea (straight arm Red: 30 sec x 1 ea (straight arm) Red: 30 sec x 1 ea (slight bent for M/L) Red: 30 sec x 1 ea (straight arm ea) Red: 30 secx  1 ea (straight arm ea) Red: 30 sec x 1 ea (straight arm) ea  Red: 30 sec x 1 ea (straight arm - had to bend towards the end due to fatigue)   Supination and pronation 4# 20 4# 30 4# 30 4# 30 4# 30 4# 30  4# 20   DB RD ecc  2# 20 2# 20 2# 30 2# 2x15 2# 2x15     Supination and pronation with Hammer+weight NV?       NV?   Ther Activity            Dunaway carry with slight  elevation of shoulders and scap retraction NV?    7# DBs 3 laps 7# Dbs 3 laps   NV?               Gait Training                                   Modalities            MH 10 mins with there ex 10 mins with there ex 10 mins with there ex and 10 mins post 10 mins with there ex  10 mins with there ex 10 mins with there ex  10 mins with there ex

## 2024-07-05 ENCOUNTER — OFFICE VISIT (OUTPATIENT)
Dept: PHYSICAL THERAPY | Facility: CLINIC | Age: 59
End: 2024-07-05
Payer: COMMERCIAL

## 2024-07-05 DIAGNOSIS — M25.521 RIGHT ELBOW PAIN: Primary | ICD-10-CM

## 2024-07-05 PROCEDURE — 97140 MANUAL THERAPY 1/> REGIONS: CPT | Performed by: PHYSICAL THERAPIST

## 2024-07-05 PROCEDURE — 97112 NEUROMUSCULAR REEDUCATION: CPT | Performed by: PHYSICAL THERAPIST

## 2024-07-05 NOTE — PROGRESS NOTES
Daily Note     Today's date: 2024  Patient name: Theresa Kim  : 1965  MRN: [id number]  Referring provider: Joselin Brumfield DO  Dx:   Encounter Diagnosis     ICD-10-CM    1. Right elbow pain  M25.521                      Subjective: Patient reports that she was a little sore after last visit.       Objective: See treatment diary below      Assessment: Tolerated treatment well; initiated POC with UBE for active warmup. MT performed after receiving consent from pt; she demonstrates increased tone to extensor flexor mass with improves post IASTM/STM. Vcs provided on proper sequencing with exercises.  Patient demonstrated fatigue post treatment and would benefit from continued PT      Plan: Continue per plan of care.      Precautions: No past medical history on file. HTN        Access Code: [email or web address]  URL: [email or web address]  Date: 2024  Prepared by: Nancy Colby    Exercises  - Median Nerve Flossing - Tray  - 1 x daily - 7 x weekly - 2 sets - 10 reps  - Ulnar Nerve Flossing  - 1 x daily - 7 x weekly - 2 sets - 10 reps  - Seated Cervical Retraction and Extension  - 1 x daily - 7 x weekly - 3 sets - 10 reps  - Neck Retraction  - 1 x daily - 7 x weekly - 3 sets - 10 reps          Date 2/26 3/1 3/4 3/7 3/12 3/15         Visit # 1 2 3 4 5 6         FOTO done              Re-eval                     Manuals     PROM           Tspine           Elbow mobilization           Taping Tricep                       Tricep IASTM           IASTM along extensor mass SMF ext mass focus SMF extensor and flexor mass SMF extensor and flexor mass SMF extensor and flexor mass SMF extensor mass SMF extensor mass SMF extensor mass    Active release of Extensor mass SMF SMf SMF 10x SMF 10x SMF 10x NV? SMF 10x    Cupping SMF along extensor mass (3 cups for 2 mins x 1) f/b sliding along extensor mass SMF along extensor mass (3 cups for approx 2 mins) - sliding NV SMF  along extensor mass f/b sliding  SMF along extensor mass f/b sliding SMF along extensor mass f/b sliding SMF along extensor mass f/b sliding SMF along extensor and flexor mass f/b sliding    PA glides cervical            Cervical jt mobs                       Neuro Re-Ed           Rhythmic stab           Prone Ys, Ts, Is           Scap squeezes           Prone ext           Median nerve glides 2 sec; 2x10 2 sec x 20 2 sec; 2x10  With side bend away: 2x10 NV? With side bend away: 2x10 With side bend away: 2x10    Ulnar nerve glides           Radial N glides with head turn to the left 2 sec; 2x10 2 sec x 20 2 sec; 2x10 With side bend away: 2x10 NV? With side bend away: 2x10 With side bend away: 2x10    TB B Scap Retraction   GTB 2 sec x 20 GTB 2 sec x 20 GTB 2 sec; 2x15 GTB 2 sec; 2x15 GTB 2 sec; 2x15 GTB 2 sec; 2x15    TB B ' S Ext  GTB 2 sec x 20  GTB 2 sec x 20 GTB 2 sec; 2x15 GTB 2 sec; 2x15 GTB 2 sec; 2x15 GTB 2 sec; 2x15    Seated thoracic ext           Standing Thoracic Extension with hands behind head with elbows sliding up wall           Bicep stretch using biodex 15 sec x 3  15 sec x 3   15 sec x 3       Pec stretch in doorway           Elbow Flexion stretch c small towel           Seated thoracic extension stretch  (soccer ball)  2x10 (hands clasped and second round with hands behind head) 2x10 (hands clasped and second round with hands behind head)  2x10 (hands clasped and second round with hands behind head) 2x10 (hands clasped and second around with hands behind head) 2x10 (hands clasped and second round with hands behind head)  2x10 (hands clasped and second round with hands behind head) 2x10 (hands clasped and second round with hands behinds head)    Sideying Thoracic Rotation stretch           Cerivcal retraction           Cervical retraction to exntesion           Ther Ex           UBE 2 mins fwd/retro ea 2 mins fwd/retro ea 2 mins fwd/retro ea  2 mins fwd/retro 2 mins fwd/retro 2 mins fwd/retro 2  mins fwd/retro    Wrist ext/flex DB 4# 20 ea (rolling weights on finger tips during flexion) 4# 30 ea (rolling weights on finger tips during flexion) 4# 30 ea (rolling weights on finger tips during flexion 4# 30 ea (rolling weights on finger tips during flexion) 4# 30 ea (rolling weights on finger tips during flexion) 4# 30 ea (rolling weights on finger tips during flexion) 4# 30 ea (rolling weights on finger tips during flexor)    Diggiflex (all, individual) Red (all): 20; Yellow: 10 (individual) Red (all): 20; Yellow 10 (individual) Red (all); 20; Yellow 10 (individual) Red (all): 20; yellow: 10 (individual) Red (all); 20; yellow (individual) 10 Red (all): 30; Yellow individual) 10 Red (all): 30; Yellow individual: 15    Metal gripper 50# 20 50# 50# 30 5# 30 50# 30 50# 30 50# 30    Pronation with supination during elbow flexion 4# 1x10  5# 2x10 5# 3x10 NV? 5# 30 5# 30 5# 30 5# 30                          Tricep ext           Rows, Ext            Wrist flex, ext stretch     20 sec x 3 ea NV?      strengthening            No moneys            UBE           Supination stretch           Tricep stretch           Therastick wrist flexion and extension eccentric Red: 5 sec x 20 ea Red: 5 sec x 20 ea Red: 5 sec x 20 ea Red: 5 sec x 20 ea Red: 5 sec x 20 ea Red: 5 sec x 20 ea Red: 5 sec x 25 ea    Therastick shakes AP, M/L Red: 30 sec x 1 ea (straight arm Red: 30 sec x 1 ea (straight arm) Red: 30 sec x 1 ea (slight bent for M/L) Red: 30 sec x 1 ea (straight arm ea) Red: 30 secx  1 ea (straight arm ea) Red: 30 sec x 1 ea (straight arm) ea Red: 30 sec x 1 ea (straight arm) ea    Supination and pronation 4# 20 4# 30 4# 30 4# 30 4# 30 4# 30 4# 30 ea    DB RD ecc  2# 20 2# 20 2# 30 2# 2x15 2# 2x15 2# 30    Supination and pronation with Hammer+weight NV?          Ther Activity            Dunaway carry with slight elevation of shoulders and scap retraction NV?    7# DBs 3 laps 7# Dbs 3 laps  7# Dbs 3 laps                Gait  Training                                   Modalities            MH 10 mins with there ex 10 mins with there ex 10 mins with there ex and 10 mins post 10 mins with there ex  10 mins with there ex 10 mins with there ex Def today

## 2024-07-08 ENCOUNTER — OFFICE VISIT (OUTPATIENT)
Dept: PHYSICAL THERAPY | Facility: CLINIC | Age: 59
End: 2024-07-08
Payer: COMMERCIAL

## 2024-07-08 DIAGNOSIS — M25.521 RIGHT ELBOW PAIN: Primary | ICD-10-CM

## 2024-07-08 PROCEDURE — 97112 NEUROMUSCULAR REEDUCATION: CPT | Performed by: PHYSICAL THERAPIST

## 2024-07-08 PROCEDURE — 97110 THERAPEUTIC EXERCISES: CPT | Performed by: PHYSICAL THERAPIST

## 2024-07-08 PROCEDURE — 97140 MANUAL THERAPY 1/> REGIONS: CPT | Performed by: PHYSICAL THERAPIST

## 2024-07-08 NOTE — PROGRESS NOTES
Daily Note     Today's date: 2024  Patient name: Theresa Kim  : 1965  MRN: [id number]  Referring provider: Joselin Brumfield DO  Dx:   Encounter Diagnosis     ICD-10-CM    1. Right elbow pain  M25.521                      Subjective: patient reports that she does feel tight but at      Objective: See treatment diary below      Assessment: Tolerated treatment well; initiated POC with UBE for active warmup. Vcs provided on proper sequencing with exercises; increased resistance with mid back strengthening. Overall, she denies having increased pain throughout session.  Patient demonstrated fatigue post treatment and would benefit from continued PT      Plan: Continue per plan of care.      Precautions: No past medical history on file. HTN        Access Code: [email or web address]  URL: [email or web address]  Date: 2024  Prepared by: Nancy Colby    Exercises  - Median Nerve Flossing - Tray  - 1 x daily - 7 x weekly - 2 sets - 10 reps  - Ulnar Nerve Flossing  - 1 x daily - 7 x weekly - 2 sets - 10 reps  - Seated Cervical Retraction and Extension  - 1 x daily - 7 x weekly - 3 sets - 10 reps  - Neck Retraction  - 1 x daily - 7 x weekly - 3 sets - 10 reps          Date 2/26 3/1 3/4 3/7 3/12 3/15         Visit # 1 2 3 4 5 6         FOTO done              Re-eval                     Manuals    PROM           Tspine           Elbow mobilization           Taping Tricep                       Tricep IASTM           IASTM along extensor mass SMF ext mass focus SMF extensor and flexor mass SMF extensor and flexor mass SMF extensor and flexor mass SMF extensor mass SMF extensor mass SMF extensor mass SMF extensor mass   Active release of Extensor mass SMF SMf SMF 10x SMF 10x SMF 10x NV? SMF 10x NV?   Cupping SMF along extensor mass (3 cups for 2 mins x 1) f/b sliding along extensor mass SMF along extensor mass (3 cups for approx 2 mins) - sliding NV SMF along extensor  mass f/b sliding  SMF along extensor mass f/b sliding SMF along extensor mass f/b sliding SMF along extensor mass f/b sliding SMF along extensor and flexor mass f/b sliding SMF along extensor and flexor mass f/b sliding   PA glides cervical            Cervical jt mobs                       Neuro Re-Ed           Rhythmic stab           Prone Ys, Ts, Is           Scap squeezes           Prone ext           Median nerve glides 2 sec; 2x10 2 sec x 20 2 sec; 2x10  With side bend away: 2x10 NV? With side bend away: 2x10 With side bend away: 2x10 With side bend away: 2x10   Ulnar nerve glides           Radial N glides with head turn to the left 2 sec; 2x10 2 sec x 20 2 sec; 2x10 With side bend away: 2x10 NV? With side bend away: 2x10 With side bend away: 2x10 With side bend away: 2x10   TB B Scap Retraction   GTB 2 sec x 20 GTB 2 sec x 20 GTB 2 sec; 2x15 GTB 2 sec; 2x15 GTB 2 sec; 2x15 GTB 2 sec; 2x15 BTB 3x10    TB B ' S Ext  GTB 2 sec x 20  GTB 2 sec x 20 GTB 2 sec; 2x15 GTB 2 sec; 2x15 GTB 2 sec; 2x15 GTB 2 sec; 2x15 BTB 2 sec; 3x10   Seated thoracic ext           Standing Thoracic Extension with hands behind head with elbows sliding up wall           Bicep stretch using biodex 15 sec x 3  15 sec x 3   15 sec x 3       Pec stretch in doorway           Elbow Flexion stretch c small towel           Seated thoracic extension stretch  (soccer ball)  2x10 (hands clasped and second round with hands behind head) 2x10 (hands clasped and second round with hands behind head)  2x10 (hands clasped and second round with hands behind head) 2x10 (hands clasped and second around with hands behind head) 2x10 (hands clasped and second round with hands behind head)  2x10 (hands clasped and second round with hands behind head) 2x10 (hands clasped and second round with hands behinds head) 2x10 (hands clasped and second round with hands behind head)   Sideying Thoracic Rotation stretch           Cerivcal retraction           Cervical  retraction to exntesion           Ther Ex           UBE 2 mins fwd/retro ea 2 mins fwd/retro ea 2 mins fwd/retro ea  2 mins fwd/retro 2 mins fwd/retro 2 mins fwd/retro 2 mins fwd/retro 2 mins fwd/retro   Wrist ext/flex DB 4# 20 ea (rolling weights on finger tips during flexion) 4# 30 ea (rolling weights on finger tips during flexion) 4# 30 ea (rolling weights on finger tips during flexion 4# 30 ea (rolling weights on finger tips during flexion) 4# 30 ea (rolling weights on finger tips during flexion) 4# 30 ea (rolling weights on finger tips during flexion) 4# 30 ea (rolling weights on finger tips during flexor) 4# 30 ea (rolling weights on finger tips during flexor)   Diggiflex (all, individual) Red (all): 20; Yellow: 10 (individual) Red (all): 20; Yellow 10 (individual) Red (all); 20; Yellow 10 (individual) Red (all): 20; yellow: 10 (individual) Red (all); 20; yellow (individual) 10 Red (all): 30; Yellow individual) 10 Red (all): 30; Yellow individual: 15 Red (all): 30; yellow individual: 15   Metal gripper 50# 20 50# 50# 30 5# 30 50# 30 50# 30 50# 30 50# 30   Pronation with supination during elbow flexion 4# 1x10  5# 2x10 5# 3x10 NV? 5# 30 5# 30 5# 30 5# 30 5# 30                         Tricep ext           Rows, Ext            Wrist flex, ext stretch     20 sec x 3 ea NV?  20 sec x 3 ea    strengthening            No moneys            UBE           Supination stretch           Tricep stretch           Therastick wrist flexion and extension eccentric Red: 5 sec x 20 ea Red: 5 sec x 20 ea Red: 5 sec x 20 ea Red: 5 sec x 20 ea Red: 5 sec x 20 ea Red: 5 sec x 20 ea Red: 5 sec x 25 ea Red: 5 sec x 30 ea   Therastick shakes AP, M/L Red: 30 sec x 1 ea (straight arm Red: 30 sec x 1 ea (straight arm) Red: 30 sec x 1 ea (slight bent for M/L) Red: 30 sec x 1 ea (straight arm ea) Red: 30 secx  1 ea (straight arm ea) Red: 30 sec x 1 ea (straight arm) ea Red: 30 sec x 1 ea (straight arm) ea Red: 30 sec x 1 ea (straight  arm) ea   Supination and pronation 4# 20 4# 30 4# 30 4# 30 4# 30 4# 30 4# 30 ea 4# 30 ea   DB RD ecc  2# 20 2# 20 2# 30 2# 2x15 2# 2x15 2# 30 2# 30    Supination and pronation with Hammer+weight NV?          Ther Activity            Dunaway carry with slight elevation of shoulders and scap retraction NV?    7# DBs 3 laps 7# Dbs 3 laps  7# Dbs 3 laps 7# DBS               Gait Training                                   Modalities            MH 10 mins with there ex 10 mins with there ex 10 mins with there ex and 10 mins post 10 mins with there ex  10 mins with there ex 10 mins with there ex Def today Def

## 2024-07-11 ENCOUNTER — OFFICE VISIT (OUTPATIENT)
Dept: PHYSICAL THERAPY | Facility: CLINIC | Age: 59
End: 2024-07-11
Payer: COMMERCIAL

## 2024-07-11 DIAGNOSIS — M25.521 RIGHT ELBOW PAIN: Primary | ICD-10-CM

## 2024-07-11 PROCEDURE — 97112 NEUROMUSCULAR REEDUCATION: CPT | Performed by: PHYSICAL THERAPIST

## 2024-07-11 PROCEDURE — 97110 THERAPEUTIC EXERCISES: CPT | Performed by: PHYSICAL THERAPIST

## 2024-07-11 PROCEDURE — 97140 MANUAL THERAPY 1/> REGIONS: CPT | Performed by: PHYSICAL THERAPIST

## 2024-07-11 NOTE — PROGRESS NOTES
Daily Note     Today's date: 2024  Patient name: Theresa Kim  : 1965  MRN: [id number]  Referring provider: Joselin Brumfield DO  Dx:   Encounter Diagnosis     ICD-10-CM    1. Right elbow pain  M25.521                      Subjective: patient reports that she was sore after last time but okay.       Objective: See treatment diary below      Assessment: Tolerated treatment well; initiated POC with UBE for active warmup fwd and retro. Vcs provided on proper sequencing with exercises. MT performed after receiving consent from pt; extensor mass increased tone more so than flexor which improves post MT.  Discussed DOMS and modalities to use at home.  Patient demonstrated fatigue post treatment and would benefit from continued PT      Plan: Continue per plan of care.      Precautions: No past medical history on file. HTN        Access Code: [email or web address]  URL: [email or web address]  Date: 2024  Prepared by: Nancy Colby    Exercises  - Median Nerve Flossing - Tray  - 1 x daily - 7 x weekly - 2 sets - 10 reps  - Ulnar Nerve Flossing  - 1 x daily - 7 x weekly - 2 sets - 10 reps  - Seated Cervical Retraction and Extension  - 1 x daily - 7 x weekly - 3 sets - 10 reps  - Neck Retraction  - 1 x daily - 7 x weekly - 3 sets - 10 reps          Date 2/26 3/1 3/4 3/7 3/12 3/15         Visit # 1 2 3 4 5 6         FOTO done              Re-eval                     Manuals    PROM           Tspine           Elbow mobilization           Taping Tricep                       Tricep IASTM           IASTM along extensor mass SMF extensor mass   SMF extensor and flexor mass SMF extensor and flexor mass SMF extensor mass SMF extensor mass SMF extensor mass SMF extensor mass   Active release of Extensor mass   SMF 10x SMF 10x SMF 10x NV? SMF 10x NV?   Cupping SMF along extensor and flexor mass f/b sliding  SMF along extensor mass f/b sliding  SMF along extensor mass f/b  sliding SMF along extensor mass f/b sliding SMF along extensor mass f/b sliding SMF along extensor and flexor mass f/b sliding SMF along extensor and flexor mass f/b sliding   PA glides cervical            Cervical jt mobs                       Neuro Re-Ed           Rhythmic stab           Prone Ys, Ts, Is           Scap squeezes           Prone ext           Median nerve glides With side bend away: 2x10  2 sec; 2x10  With side bend away: 2x10 NV? With side bend away: 2x10 With side bend away: 2x10 With side bend away: 2x10   Ulnar nerve glides           Radial N glides with head turn to the left With side bend away: 2x10  2 sec; 2x10 With side bend away: 2x10 NV? With side bend away: 2x10 With side bend away: 2x10 With side bend away: 2x10   TB B Scap Retraction  BTB 2 sec x 20  GTB 2 sec x 20 GTB 2 sec; 2x15 GTB 2 sec; 2x15 GTB 2 sec; 2x15 GTB 2 sec; 2x15 BTB 3x10    TB B ' S Ext BTB 2 sec x 20  GTB 2 sec x 20 GTB 2 sec; 2x15 GTB 2 sec; 2x15 GTB 2 sec; 2x15 GTB 2 sec; 2x15 BTB 2 sec; 3x10   Seated thoracic ext           Standing Thoracic Extension with hands behind head with elbows sliding up wall           Bicep stretch using biodex    15 sec x 3       Pec stretch in doorway           Elbow Flexion stretch c small towel           Seated thoracic extension stretch  (soccer ball)  2x10 (hands clasped and seond round with hands behind head)   2x10 (hands clasped and second round with hands behind head) 2x10 (hands clasped and second around with hands behind head) 2x10 (hands clasped and second round with hands behind head)  2x10 (hands clasped and second round with hands behind head) 2x10 (hands clasped and second round with hands behinds head) 2x10 (hands clasped and second round with hands behind head)   Sideying Thoracic Rotation stretch           Cerivcal retraction           Cervical retraction to exntesion           Ther Ex           UBE 2 mins fwd/retro  2 mins fwd/retro ea  2 mins fwd/retro 2 mins fwd/retro  2 mins fwd/retro 2 mins fwd/retro 2 mins fwd/retro   Wrist ext/flex DB 4# 30 ea (rolling weights on finger tips during flexion)  4# 30 ea (rolling weights on finger tips during flexion 4# 30 ea (rolling weights on finger tips during flexion) 4# 30 ea (rolling weights on finger tips during flexion) 4# 30 ea (rolling weights on finger tips during flexion) 4# 30 ea (rolling weights on finger tips during flexor) 4# 30 ea (rolling weights on finger tips during flexor)   Diggiflex (all, individual) Red (all): 30; yellow individual: 15x  Red (all); 20; Yellow 10 (individual) Red (all): 20; yellow: 10 (individual) Red (all); 20; yellow (individual) 10 Red (all): 30; Yellow individual) 10 Red (all): 30; Yellow individual: 15 Red (all): 30; yellow individual: 15   Metal gripper 50# 30  50# 30 5# 30 50# 30 50# 30 50# 30 50# 30   Pronation with supination during elbow flexion 5# 30  NV? 5# 30 5# 30 5# 30 5# 30 5# 30                         Tricep ext           Rows, Ext            Wrist flex, ext stretch     20 sec x 3 ea NV?  20 sec x 3 ea    strengthening            No moneys            UBE           Supination stretch           Tricep stretch           Therastick wrist flexion and extension eccentric Red: 5 sec x 30 ea  Red: 5 sec x 20 ea Red: 5 sec x 20 ea Red: 5 sec x 20 ea Red: 5 sec x 20 ea Red: 5 sec x 25 ea Red: 5 sec x 30 ea   Therastick shakes AP, M/L Red: 30 sec x 1 ea (straight arm) ea  Red: 30 sec x 1 ea (slight bent for M/L) Red: 30 sec x 1 ea (straight arm ea) Red: 30 secx  1 ea (straight arm ea) Red: 30 sec x 1 ea (straight arm) ea Red: 30 sec x 1 ea (straight arm) ea Red: 30 sec x 1 ea (straight arm) ea   Supination and pronation 4# 30 ea  4# 30 4# 30 4# 30 4# 30 4# 30 ea 4# 30 ea   DB RD ecc 2# 30  2# 20 2# 30 2# 2x15 2# 2x15 2# 30 2# 30    Supination and pronation with Hammer+weight           Ther Activity            Dunaway carry with slight elevation of shoulders and scap retraction 7# Dbs 3 laps     7#  DBs 3 laps 7# Dbs 3 laps  7# Dbs 3 laps 7# DBS               Gait Training                                   Modalities            MH   10 mins with there ex and 10 mins post 10 mins with there ex  10 mins with there ex 10 mins with there ex Def today Def

## 2024-07-12 ENCOUNTER — APPOINTMENT (OUTPATIENT)
Dept: PHYSICAL THERAPY | Facility: CLINIC | Age: 59
End: 2024-07-12
Payer: COMMERCIAL

## 2024-07-15 ENCOUNTER — OFFICE VISIT (OUTPATIENT)
Dept: PHYSICAL THERAPY | Facility: CLINIC | Age: 59
End: 2024-07-15
Payer: COMMERCIAL

## 2024-07-15 DIAGNOSIS — M25.521 RIGHT ELBOW PAIN: Primary | ICD-10-CM

## 2024-07-15 PROCEDURE — 97140 MANUAL THERAPY 1/> REGIONS: CPT | Performed by: PHYSICAL THERAPIST

## 2024-07-15 PROCEDURE — 97110 THERAPEUTIC EXERCISES: CPT | Performed by: PHYSICAL THERAPIST

## 2024-07-15 PROCEDURE — 97112 NEUROMUSCULAR REEDUCATION: CPT | Performed by: PHYSICAL THERAPIST

## 2024-07-15 NOTE — PROGRESS NOTES
Daily Note     Today's date: 7/15/2024  Patient name: Theresa Kim  : 1965  MRN: [id number]  Referring provider: Joselin Brumfield DO  Dx:   Encounter Diagnosis     ICD-10-CM    1. Right elbow pain  M25.521                      Subjective: She reports that she feels okay today.       Objective: See treatment diary below      Assessment: Tolerated treatment well; initiated POC with UBE for active warmup. Vcs provided on proper sequencing with exercises. She denies having pain throughout session. Muscle endurance is improving overall.  Patient demonstrated fatigue post treatment and would benefit from continued PT      Plan: Continue per plan of care.      Precautions: No past medical history on file. HTN        Access Code: [email or web address]  URL: [email or web address]  Date: 2024  Prepared by: Nancy Colby    Exercises  - Median Nerve Flossing - Tray  - 1 x daily - 7 x weekly - 2 sets - 10 reps  - Ulnar Nerve Flossing  - 1 x daily - 7 x weekly - 2 sets - 10 reps  - Seated Cervical Retraction and Extension  - 1 x daily - 7 x weekly - 3 sets - 10 reps  - Neck Retraction  - 1 x daily - 7 x weekly - 3 sets - 10 reps          Date 2/26 3/1 3/4 3/7 3/12 3/15         Visit # 1 2 3 4 5 6         FOTO done              Re-eval                     Manuals 7/11 7/15   6/27 7/1 7/5 7/8   PROM           Tspine           Elbow mobilization           Taping Tricep                       Tricep IASTM           IASTM along extensor mass SMF extensor mass  SMF extensor mass   SMF extensor mass SMF extensor mass SMF extensor mass SMF extensor mass   Active release of Extensor mass     SMF 10x NV? SMF 10x NV?   Cupping SMF along extensor and flexor mass f/b sliding SMF along extensor and flexor    SMF along extensor mass f/b sliding SMF along extensor mass f/b sliding SMF along extensor and flexor mass f/b sliding SMF along extensor and flexor mass f/b sliding   PA glides cervical            Cervical jt mobs                        Neuro Re-Ed           Rhythmic stab           Prone Ys, Ts, Is           Scap squeezes           Prone ext           Median nerve glides With side bend away: 2x10 With side bend away: 2x10   NV? With side bend away: 2x10 With side bend away: 2x10 With side bend away: 2x10   Ulnar nerve glides           Radial N glides with head turn to the left With side bend away: 2x10 With side bend away: 2x10   NV? With side bend away: 2x10 With side bend away: 2x10 With side bend away: 2x10   TB B Scap Retraction  BTB 2 sec x 20 BTB 2 sec; 2x15   GTB 2 sec; 2x15 GTB 2 sec; 2x15 GTB 2 sec; 2x15 BTB 3x10    TB B ' S Ext BTB 2 sec x 20 BTB 2 sec; 2x15    GTB 2 sec; 2x15 GTB 2 sec; 2x15 GTB 2 sec; 2x15 BTB 2 sec; 3x10   Seated thoracic ext           Standing Thoracic Extension with hands behind head with elbows sliding up wall           Bicep stretch using [employer]           Pec stretch in doorway           Elbow Flexion stretch c small towel           Seated thoracic extension stretch  (soccer ball)  2x10 (hands clasped and seond round with hands behind head)  2x10 (hands clasped and second round with hands behind head)    2x10 (hands clasped and second round with hands behind head)  2x10 (hands clasped and second round with hands behind head) 2x10 (hands clasped and second round with hands behinds head) 2x10 (hands clasped and second round with hands behind head)   Sideying Thoracic Rotation stretch           Cerivcal retraction           Cervical retraction to exntesion           Ther Ex           UBE 2 mins fwd/retro 2 mins fwd/retro   2 mins fwd/retro 2 mins fwd/retro 2 mins fwd/retro 2 mins fwd/retro   Wrist ext/flex DB 4# 30 ea (rolling weights on finger tips during flexion) 4# 30 ea (rolling weights on finger tips during flexion)   4# 30 ea (rolling weights on finger tips during flexion) 4# 30 ea (rolling weights on finger tips during flexion) 4# 30 ea (rolling weights on finger tips during flexor) 4# 30  ea (rolling weights on finger tips during flexor)   Diggiflex (all, individual) Red (all): 30; yellow individual: 15x Red (all): 30; yellow individual: 10x   Red (all); 20; yellow (individual) 10 Red (all): 30; Yellow individual) 10 Red (all): 30; Yellow individual: 15 Red (all): 30; yellow individual: 15   Metal gripper 50# 30 50# 30   50# 30 50# 30 50# 30 50# 30   Pronation with supination during elbow flexion 5# 30 5# 30 ea   5# 30 5# 30 5# 30 5# 30                         Tricep ext           Rows, Ext            Wrist flex, ext stretch     20 sec x 3 ea NV?  20 sec x 3 ea    strengthening            No moneys            UBE           Supination stretch           Tricep stretch           Therastick wrist flexion and extension eccentric Red: 5 sec x 30 ea Red: 5 sec x 30 ea   Red: 5 sec x 20 ea Red: 5 sec x 20 ea Red: 5 sec x 25 ea Red: 5 sec x 30 ea   Therastick shakes AP, M/L Red: 30 sec x 1 ea (straight arm) ea Red: 30 sec x 1 ea (straight arm) ea   Red: 30 secx  1 ea (straight arm ea) Red: 30 sec x 1 ea (straight arm) ea Red: 30 sec x 1 ea (straight arm) ea Red: 30 sec x 1 ea (straight arm) ea   Supination and pronation 4# 30 ea 4# 30 ea   4# 30 4# 30 4# 30 ea 4# 30 ea   DB RD ecc 2# 30 2# 30    2# 2x15 2# 2x15 2# 30 2# 30    Supination and pronation with Hammer+weight           Ther Activity            Dunaway carry with slight elevation of shoulders and scap retraction 7# Dbs 3 laps  7# Dbs 3 laps    7# DBs 3 laps 7# Dbs 3 laps  7# Dbs 3 laps 7# DBS               Gait Training                                   Modalities            MH     10 mins with there ex 10 mins with there ex Def today Def

## 2024-07-18 ENCOUNTER — OFFICE VISIT (OUTPATIENT)
Dept: PHYSICAL THERAPY | Facility: CLINIC | Age: 59
End: 2024-07-18
Payer: COMMERCIAL

## 2024-07-18 DIAGNOSIS — M25.521 RIGHT ELBOW PAIN: Primary | ICD-10-CM

## 2024-07-18 PROCEDURE — 97112 NEUROMUSCULAR REEDUCATION: CPT | Performed by: PHYSICAL THERAPIST

## 2024-07-18 PROCEDURE — 97140 MANUAL THERAPY 1/> REGIONS: CPT | Performed by: PHYSICAL THERAPIST

## 2024-07-18 PROCEDURE — 97110 THERAPEUTIC EXERCISES: CPT | Performed by: PHYSICAL THERAPIST

## 2024-07-18 NOTE — PROGRESS NOTES
Daily Note     Today's date: 2024  Patient name: Theresa Kim  : 1965  MRN: [id number]  Referring provider: Joselin Brumfield DO  Dx:   Encounter Diagnosis     ICD-10-CM    1. Right elbow pain  M25.521                      Subjective: Patient reports that she was doing something and felt it but unsure as to what it was.       Objective: See treatment diary below      Assessment: Tolerated treatment well; initiated POC with UBE for active warmup. Vcs provided on proper sequencing with exercises; added TB B ER and forearm pronation and supination. She reports having tenderness along the tendon sheath with De Quervain's and thus instructed pt on stretching. MT performed after receiving consent from pt; she has increased tone tonight with extensor mass which improves post STM/TPR.  Patient demonstrated fatigue post treatment and would benefit from continued PT      Plan: Continue per plan of care.      Precautions: No past medical history on file. HTN        Access Code: [email or web address]  URL: [email or web address]  Date: 2024  Prepared by: Nancy Colby    Exercises  - Median Nerve Flossing - Tray  - 1 x daily - 7 x weekly - 2 sets - 10 reps  - Ulnar Nerve Flossing  - 1 x daily - 7 x weekly - 2 sets - 10 reps  - Seated Cervical Retraction and Extension  - 1 x daily - 7 x weekly - 3 sets - 10 reps  - Neck Retraction  - 1 x daily - 7 x weekly - 3 sets - 10 reps          Date 2/26 3/1 3/4 3/7 3/12 3/15         Visit # 1 2 3 4 5 6         FOTO done              Re-eval                     Manuals 7/11 7/15 7/18  6/27 7/1 7/5 7/8   PROM           Tspine           Elbow mobilization           Taping Tricep                       Tricep IASTM           IASTM along extensor mass SMF extensor mass  SMF extensor mass SMF ext and flex mass  SMF extensor mass SMF extensor mass SMF extensor mass SMF extensor mass   Active release of Extensor mass     SMF 10x NV? SMF 10x NV?   Cupping SMF along  extensor and flexor mass f/b sliding SMF along extensor and flexor  SMF along ext and flex  SMF along extensor mass f/b sliding SMF along extensor mass f/b sliding SMF along extensor and flexor mass f/b sliding SMF along extensor and flexor mass f/b sliding   PA glides cervical            Cervical jt mobs                       Neuro Re-Ed           Rhythmic stab           Prone Ys, Ts, Is           Scap squeezes           Prone ext           Median nerve glides With side bend away: 2x10 With side bend away: 2x10 With side bend away: 2x10  NV? With side bend away: 2x10 With side bend away: 2x10 With side bend away: 2x10   Ulnar nerve glides           Radial N glides with head turn to the left With side bend away: 2x10 With side bend away: 2x10 With side bend away: 2x10  NV? With side bend away: 2x10 With side bend away: 2x10 With side bend away: 2x10   TB B Scap Retraction  BTB 2 sec x 20 BTB 2 sec; 2x15 BTB 2 sec; 2x15  GTB 2 sec; 2x15 GTB 2 sec; 2x15 GTB 2 sec; 2x15 BTB 3x10    TB B ' S Ext BTB 2 sec x 20 BTB 2 sec; 2x15  BTB 2 sec; 2x15  GTB 2 sec; 2x15 GTB 2 sec; 2x15 GTB 2 sec; 2x15 BTB 2 sec; 3x10   Seated thoracic ext           Standing Thoracic Extension with hands behind head with elbows sliding up wall           Bicep stretch using biodex           Pec stretch in doorway           Elbow Flexion stretch c small towel           Seated thoracic extension stretch  (soccer ball)  2x10 (hands clasped and seond round with hands behind head)  2x10 (hands clasped and second round with hands behind head)  2x10 (hands clasped and second round with hands behind head)  2x10 (hands clasped and second round with hands behind head)  2x10 (hands clasped and second round with hands behind head) 2x10 (hands clasped and second round with hands behinds head) 2x10 (hands clasped and second round with hands behind head)   Sideying Thoracic Rotation stretch           Cerivcal retraction           Cervical retraction to exntesion            Ther Ex           UBE 2 mins fwd/retro 2 mins fwd/retro 2 mins fwd/retro  2 mins fwd/retro 2 mins fwd/retro 2 mins fwd/retro 2 mins fwd/retro   Wrist ext/flex DB 4# 30 ea (rolling weights on finger tips during flexion) 4# 30 ea (rolling weights on finger tips during flexion) 4# 30 ea (rolling weights on finger tips during flex)  4# 30 ea (rolling weights on finger tips during flexion) 4# 30 ea (rolling weights on finger tips during flexion) 4# 30 ea (rolling weights on finger tips during flexor) 4# 30 ea (rolling weights on finger tips during flexor)   Diggiflex (all, individual) Red (all): 30; yellow individual: 15x Red (all): 30; yellow individual: 10x Red (all): 30; yellow individual: 15x  Red (all); 20; yellow (individual) 10 Red (all): 30; Yellow individual) 10 Red (all): 30; Yellow individual: 15 Red (all): 30; yellow individual: 15   Metal gripper 50# 30 50# 30 50# 30  50# 30 50# 30 50# 30 50# 30   Pronation with supination during elbow flexion 5# 30 5# 30 ea 5# 30 ea  5# 30 5# 30 5# 30 5# 30                         Tricep ext           Rows, Ext            Wrist flex, ext stretch     20 sec x 3 ea NV?  20 sec x 3 ea    strengthening            No moneys    GTB 5 sec; 2x10                   Supination stretch           Tricep stretch           Therastick wrist flexion and extension eccentric Red: 5 sec x 30 ea Red: 5 sec x 30 ea Red: 5 sec x 25 ea  Red: 5 sec x 20 ea Red: 5 sec x 20 ea Red: 5 sec x 25 ea Red: 5 sec x 30 ea   Therastick shakes AP, M/L Red: 30 sec x 1 ea (straight arm) ea Red: 30 sec x 1 ea (straight arm) ea Red: 30 sec x 1 ea (straight arm) ea  Red: 30 secx  1 ea (straight arm ea) Red: 30 sec x 1 ea (straight arm) ea Red: 30 sec x 1 ea (straight arm) ea Red: 30 sec x 1 ea (straight arm) ea   Supination and pronation 4# 30 ea 4# 30 ea 4# 30 ea  4# 30 4# 30 4# 30 ea 4# 30 ea   DB RD ecc 2# 30 2# 30  2# 30  2# 2x15 2# 2x15 2# 30 2# 30    Supination and pronation with Hammer+weight    1.5# ankle weight: 15 ea        Ther Activity            Dunaway carry with slight elevation of shoulders and scap retraction 7# Dbs 3 laps  7# Dbs 3 laps  7# Dbs 3 laps  7# DBs 3 laps 7# Dbs 3 laps  7# Dbs 3 laps 7# DBS               Gait Training                                   Modalities            MH     10 mins with there ex 10 mins with there ex Def today Def

## 2024-07-22 ENCOUNTER — OFFICE VISIT (OUTPATIENT)
Dept: PHYSICAL THERAPY | Facility: CLINIC | Age: 59
End: 2024-07-22
Payer: COMMERCIAL

## 2024-07-22 DIAGNOSIS — M25.521 RIGHT ELBOW PAIN: Primary | ICD-10-CM

## 2024-07-22 PROCEDURE — 97140 MANUAL THERAPY 1/> REGIONS: CPT | Performed by: PHYSICAL THERAPIST

## 2024-07-22 PROCEDURE — 97112 NEUROMUSCULAR REEDUCATION: CPT | Performed by: PHYSICAL THERAPIST

## 2024-07-22 PROCEDURE — 97110 THERAPEUTIC EXERCISES: CPT | Performed by: PHYSICAL THERAPIST

## 2024-07-22 NOTE — PROGRESS NOTES
Daily Note     Today's date: 2024  Patient name: Theresa Kim  : 1965  MRN: [id number]  Referring provider: Joselin Brumfield DO  Dx:   Encounter Diagnosis     ICD-10-CM    1. Right elbow pain  M25.521                      Subjective: Patient reports that she feels okay today. She was a little sore after last visit.       Objective: See treatment diary below      Assessment: Tolerated treatment well; initiated POC with SciFIT for active warmup and to increase circulation.  Vcs provided on proper sequencing with exercises. MT performed after receiving consent form pt; she continues to have increased tone along extensor mass more medially.   Patient demonstrated fatigue post treatment and would benefit from continued PT      Plan: Continue per plan of care.      Precautions: No past medical history on file. HTN        Access Code: [email or web address]  URL: [email or web address]  Date: 2024  Prepared by: Nancy Colby    Exercises  - Median Nerve Flossing - Tray  - 1 x daily - 7 x weekly - 2 sets - 10 reps  - Ulnar Nerve Flossing  - 1 x daily - 7 x weekly - 2 sets - 10 reps  - Seated Cervical Retraction and Extension  - 1 x daily - 7 x weekly - 3 sets - 10 reps  - Neck Retraction  - 1 x daily - 7 x weekly - 3 sets - 10 reps          Date 2/26 3/1 3/4 3/7 3/12 3/15         Visit # 1 2 3 4 5 6         FOTO done              Re-eval                     Manuals 7/11 7/15 7/18 7/22   7/5 7/8   PROM           Tspine           Elbow mobilization           Taping Tricep                       Tricep IASTM           IASTM along extensor mass SMF extensor mass  SMF extensor mass SMF ext and flex mass SMF ext and flex mass   SMF extensor mass SMF extensor mass   Active release of Extensor mass       SMF 10x NV?   Cupping SMF along extensor and flexor mass f/b sliding SMF along extensor and flexor  SMF along ext and flex SMF along ext and flex f/b sliding   SMF along extensor and flexor mass f/b  sliding SMF along extensor and flexor mass f/b sliding   PA glides cervical            Cervical jt mobs                       Neuro Re-Ed           Rhythmic stab           Prone Ys, Ts, Is           Scap squeezes           Prone ext           Median nerve glides With side bend away: 2x10 With side bend away: 2x10 With side bend away: 2x10 With side bend away: 2x10   With side bend away: 2x10 With side bend away: 2x10   Ulnar nerve glides           Radial N glides with head turn to the left With side bend away: 2x10 With side bend away: 2x10 With side bend away: 2x10 With side bend away: 2x10   With side bend away: 2x10 With side bend away: 2x10   TB B Scap Retraction  BTB 2 sec x 20 BTB 2 sec; 2x15 BTB 2 sec; 2x15 BTB 2 sec; 2x15   GTB 2 sec; 2x15 BTB 3x10    TB B ' S Ext BTB 2 sec x 20 BTB 2 sec; 2x15  BTB 2 sec; 2x15 BTB 2 sec; 2x15   GTB 2 sec; 2x15 BTB 2 sec; 3x10   Seated thoracic ext           Standing Thoracic Extension with hands behind head with elbows sliding up wall           Bicep stretch using [employer]           Pec stretch in doorway           Elbow Flexion stretch c small towel           Seated thoracic extension stretch  (soccer ball)  2x10 (hands clasped and seond round with hands behind head)  2x10 (hands clasped and second round with hands behind head)  2x10 (hands clasped and second round with hands behind head) 2x10 (hands clasped and second round with hands behind head)   2x10 (hands clasped and second round with hands behinds head) 2x10 (hands clasped and second round with hands behind head)   Sideying Thoracic Rotation stretch           Cerivcal retraction           Cervical retraction to exntesion           Ther Ex           UBE 2 mins fwd/retro 2 mins fwd/retro 2 mins fwd/retro 2 mins fwd/retro   2 mins fwd/retro 2 mins fwd/retro   Wrist ext/flex DB 4# 30 ea (rolling weights on finger tips during flexion) 4# 30 ea (rolling weights on finger tips during flexion) 4# 30 ea (rolling weights on  finger tips during flex) 4# 30 ea (rolling weights on finger tips during flex)   4# 30 ea (rolling weights on finger tips during flexor) 4# 30 ea (rolling weights on finger tips during flexor)   Diggiflex (all, individual) Red (all): 30; yellow individual: 15x Red (all): 30; yellow individual: 10x Red (all): 30; yellow individual: 15x Red (all): 30; Yellow individual: 15x   Red (all): 30; Yellow individual: 15 Red (all): 30; yellow individual: 15   Metal gripper 50# 30 50# 30 50# 30 50# 30   50# 30 50# 30   Pronation with supination during elbow flexion 5# 30 5# 30 ea 5# 30 ea 5# 30 ea   5# 30 5# 30                         Tricep ext           Rows, Ext            Wrist flex, ext stretch        20 sec x 3 ea    strengthening            No moneys    GTB 5 sec; 2x10 NV?                  Supination stretch           Tricep stretch           Therastick wrist flexion and extension eccentric Red: 5 sec x 30 ea Red: 5 sec x 30 ea Red: 5 sec x 25 ea Red: 5 sec x 30 ea   Red: 5 sec x 25 ea Red: 5 sec x 30 ea   Therastick shakes AP, M/L Red: 30 sec x 1 ea (straight arm) ea Red: 30 sec x 1 ea (straight arm) ea Red: 30 sec x 1 ea (straight arm) ea Red: 30 sec x 1 ea (straight arm ea)    Red: 30 sec x 1 ea (straight arm) ea Red: 30 sec x 1 ea (straight arm) ea   Supination and pronation 4# 30 ea 4# 30 ea 4# 30 ea 4# 30 ea   4# 30 ea 4# 30 ea   DB RD ecc 2# 30 2# 30  2# 30 2# 30   2# 30 2# 30    Supination and pronation with Hammer+weight   1.5# ankle weight: 15 ea 1.5# ankle weight: 30 ea       Ther Activity            Dunaway carry with slight elevation of shoulders and scap retraction 7# Dbs 3 laps  7# Dbs 3 laps  7# Dbs 3 laps 7# Dbs 3 laps    7# Dbs 3 laps 7# DBS               Gait Training                                   Modalities            MH       Def today Def

## 2024-07-25 ENCOUNTER — OFFICE VISIT (OUTPATIENT)
Dept: PHYSICAL THERAPY | Facility: CLINIC | Age: 59
End: 2024-07-25
Payer: COMMERCIAL

## 2024-07-25 DIAGNOSIS — M25.521 RIGHT ELBOW PAIN: Primary | ICD-10-CM

## 2024-07-25 PROCEDURE — 97140 MANUAL THERAPY 1/> REGIONS: CPT | Performed by: PHYSICAL THERAPIST

## 2024-07-25 PROCEDURE — 97112 NEUROMUSCULAR REEDUCATION: CPT | Performed by: PHYSICAL THERAPIST

## 2024-07-25 PROCEDURE — 97110 THERAPEUTIC EXERCISES: CPT | Performed by: PHYSICAL THERAPIST

## 2024-07-25 NOTE — PROGRESS NOTES
Daily Note     Today's date: 2024  Patient name: Theresa Kim  : 1965  MRN: [id number]  Referring provider: Joselin Brumfield DO  Dx:   Encounter Diagnosis     ICD-10-CM    1. Right elbow pain  M25.521                      Subjective: Patient reports that she was sore after last time but nothing worse.       Objective: See treatment diary below      Assessment: Tolerated treatment well; initiated POC with UBE for active warmup. Vcs provided on proper sequencing with exercises; resumed TB B ER. MT performed after receiving consent from pt; TPR/STM with increased tone along extensor mass which improves post MT. Overall, muscle endurance is improving since I.E.   Patient demonstrated fatigue post treatment and would benefit from continued PT      Plan: Continue per plan of care.      Precautions: No past medical history on file. HTN        Access Code: [email or web address]  URL: [email or web address]  Date: 2024  Prepared by: Nancy Colby    Exercises  - Median Nerve Flossing - Tray  - 1 x daily - 7 x weekly - 2 sets - 10 reps  - Ulnar Nerve Flossing  - 1 x daily - 7 x weekly - 2 sets - 10 reps  - Seated Cervical Retraction and Extension  - 1 x daily - 7 x weekly - 3 sets - 10 reps  - Neck Retraction  - 1 x daily - 7 x weekly - 3 sets - 10 reps          Date 2/26 3/1 3/4 3/7 3/12 3/15         Visit # 1 2 3 4 5 6         FOTO done              Re-eval                     Manuals 7/11 7/15 7/18 7/22 7/25   7/8   PROM           Tspine           Elbow mobilization           Taping Tricep                       Tricep IASTM           IASTM along extensor mass SMF extensor mass  SMF extensor mass SMF ext and flex mass SMF ext and flex mass SMF ext and flex mass   SMF extensor mass   Active release of Extensor mass        NV?   Cupping SMF along extensor and flexor mass f/b sliding SMF along extensor and flexor  SMF along ext and flex SMF along ext and flex f/b sliding SMF along ext mass only f/b  sliding   SMF along extensor and flexor mass f/b sliding   PA glides cervical            Cervical jt mobs                       Neuro Re-Ed           Rhythmic stab           Prone Ys, Ts, Is           Scap squeezes           Prone ext           Median nerve glides With side bend away: 2x10 With side bend away: 2x10 With side bend away: 2x10 With side bend away: 2x10 With side bend away: 2x10   With side bend away: 2x10   Ulnar nerve glides           Radial N glides with head turn to the left With side bend away: 2x10 With side bend away: 2x10 With side bend away: 2x10 With side bend away: 2x10 With side bend away: 2x10   With side bend away: 2x10   TB B Scap Retraction  BTB 2 sec x 20 BTB 2 sec; 2x15 BTB 2 sec; 2x15 BTB 2 sec; 2x15 BTB 2 sec; 2x15   BTB 3x10    TB B ' S Ext BTB 2 sec x 20 BTB 2 sec; 2x15  BTB 2 sec; 2x15 BTB 2 sec; 2x15 BTB 2 sec; 2x15    BTB 2 sec; 3x10   Seated thoracic ext           Standing Thoracic Extension with hands behind head with elbows sliding up wall           Bicep stretch using [employer]           Pec stretch in doorway           Elbow Flexion stretch c small towel           Seated thoracic extension stretch  (soccer ball)  2x10 (hands clasped and seond round with hands behind head)  2x10 (hands clasped and second round with hands behind head)  2x10 (hands clasped and second round with hands behind head) 2x10 (hands clasped and second round with hands behind head) 2x10 (hands clasped and second round with hands behind head)   2x10 (hands clasped and second round with hands behind head)   Sideying Thoracic Rotation stretch           Cerivcal retraction           Cervical retraction to exntesion           Ther Ex           UBE 2 mins fwd/retro 2 mins fwd/retro 2 mins fwd/retro 2 mins fwd/retro 2 mins fwd/retro   2 mins fwd/retro   Wrist ext/flex DB 4# 30 ea (rolling weights on finger tips during flexion) 4# 30 ea (rolling weights on finger tips during flexion) 4# 30 ea (rolling weights on  finger tips during flex) 4# 30 ea (rolling weights on finger tips during flex) 4# 30 ea (rolling weights on fingers tips during flex)    4# 30 ea (rolling weights on finger tips during flexor)   Diggiflex (all, individual) Red (all): 30; yellow individual: 15x Red (all): 30; yellow individual: 10x Red (all): 30; yellow individual: 15x Red (all): 30; Yellow individual: 15x Red (all): 30; Yellow individual: 20x   Red (all): 30; yellow individual: 15   Metal gripper 50# 30 50# 30 50# 30 50# 30 50# 30   50# 30   Pronation with supination during elbow flexion 5# 30 5# 30 ea 5# 30 ea 5# 30 ea 5# 30 ea   5# 30                         Tricep ext           Rows, Ext            Wrist flex, ext stretch        20 sec x 3 ea    strengthening            No moneys    GTB 5 sec; 2x10 NV? GTB 5 sec; 3x10                 Supination stretch           Tricep stretch           Therastick wrist flexion and extension eccentric Red: 5 sec x 30 ea Red: 5 sec x 30 ea Red: 5 sec x 25 ea Red: 5 sec x 30 ea Red: 5 sec x 30 ea   Red: 5 sec x 30 ea   Therastick shakes AP, M/L Red: 30 sec x 1 ea (straight arm) ea Red: 30 sec x 1 ea (straight arm) ea Red: 30 sec x 1 ea (straight arm) ea Red: 30 sec x 1 ea (straight arm ea)  Red: 30 sec x 1 ea (straight arm ea)   Red: 30 sec x 1 ea (straight arm) ea   Supination and pronation 4# 30 ea 4# 30 ea 4# 30 ea 4# 30 ea 4# 30 ea   4# 30 ea   DB RD ecc 2# 30 2# 30  2# 30 2# 30 2# 30   2# 30    Supination and pronation with Hammer+weight   1.5# ankle weight: 15 ea 1.5# ankle weight: 30 ea 1.5# ankle weight: 20 ea      Ther Activity            Dunaway carry with slight elevation of shoulders and scap retraction 7# Dbs 3 laps  7# Dbs 3 laps  7# Dbs 3 laps 7# Dbs 3 laps  7# Dbs 3 laps ea   7# DBS   DB carrying with head of weight     NV?                  Gait Training                                   Modalities            MH        Def

## 2024-07-29 ENCOUNTER — OFFICE VISIT (OUTPATIENT)
Dept: PHYSICAL THERAPY | Facility: CLINIC | Age: 59
End: 2024-07-29
Payer: COMMERCIAL

## 2024-07-29 DIAGNOSIS — M25.521 RIGHT ELBOW PAIN: Primary | ICD-10-CM

## 2024-07-29 PROCEDURE — 97112 NEUROMUSCULAR REEDUCATION: CPT | Performed by: PHYSICAL THERAPIST

## 2024-07-29 PROCEDURE — 97110 THERAPEUTIC EXERCISES: CPT | Performed by: PHYSICAL THERAPIST

## 2024-07-29 PROCEDURE — 97530 THERAPEUTIC ACTIVITIES: CPT | Performed by: PHYSICAL THERAPIST

## 2024-07-29 PROCEDURE — 97140 MANUAL THERAPY 1/> REGIONS: CPT | Performed by: PHYSICAL THERAPIST

## 2024-07-29 NOTE — PROGRESS NOTES
Daily Note     Today's date: 2024  Patient name: Theresa Kim  : 1965  MRN: [id number]  Referring provider: Joselin Brumfield DO  Dx:   Encounter Diagnosis     ICD-10-CM    1. Right elbow pain  M25.521                      Subjective: patient reports that she continues to be tight with palpation.       Objective: See treatment diary below      Assessment: Tolerated treatment well; initiated POC with UBE for active warmup. Vcs provided on proper sequencing with exercises; added weight hold using towel today for  endurance. She reports soreness post session. She was instructed on stretching and modalities at home.  Patient demonstrated fatigue post treatment and would benefit from continued PT      Plan: Continue per plan of care.      Precautions: No past medical history on file. HTN        Access Code: [email or web address]  URL: [email or web address]  Date: 2024  Prepared by: Nancy Colby    Exercises  - Median Nerve Flossing - Tray  - 1 x daily - 7 x weekly - 2 sets - 10 reps  - Ulnar Nerve Flossing  - 1 x daily - 7 x weekly - 2 sets - 10 reps  - Seated Cervical Retraction and Extension  - 1 x daily - 7 x weekly - 3 sets - 10 reps  - Neck Retraction  - 1 x daily - 7 x weekly - 3 sets - 10 reps          Date 2/26 3/1 3/4 3/7 3/12 3/15         Visit # 1 2 3 4 5 6         FOTO done              Re-eval                     Manuals 7/11 7/15 7/18 7/22 7/25 7/29     PROM           Tspine           Elbow mobilization           Taping Tricep                       Tricep IASTM           IASTM along extensor mass SMF extensor mass  SMF extensor mass SMF ext and flex mass SMF ext and flex mass SMF ext and flex mass SMF ext and flex mass     Active release of Extensor mass           Cupping SMF along extensor and flexor mass f/b sliding SMF along extensor and flexor  SMF along ext and flex SMF along ext and flex f/b sliding SMF along ext mass only f/b sliding SMF along extensio mass only f/b  sliding     PA glides cervical            Cervical jt mobs                       Neuro Re-Ed           Rhythmic stab           Prone Ys, Ts, Is           Scap squeezes           Prone ext           Median nerve glides With side bend away: 2x10 With side bend away: 2x10 With side bend away: 2x10 With side bend away: 2x10 With side bend away: 2x10 With side bend kristin: 2x10     Ulnar nerve glides           Radial N glides with head turn to the left With side bend away: 2x10 With side bend away: 2x10 With side bend away: 2x10 With side bend away: 2x10 With side bend away: 2x10 With side bend away: 2x10     TB B Scap Retraction  BTB 2 sec x 20 BTB 2 sec; 2x15 BTB 2 sec; 2x15 BTB 2 sec; 2x15 BTB 2 sec; 2x15 BTB 2 sec; 2x15      TB B ' S Ext BTB 2 sec x 20 BTB 2 sec; 2x15  BTB 2 sec; 2x15 BTB 2 sec; 2x15 BTB 2 sec; 2x15  BTB 2 sec; 2x15     Seated thoracic ext           Standing Thoracic Extension with hands behind head with elbows sliding up wall           Bicep stretch using [employer]           Pec stretch in doorway           Elbow Flexion stretch c small towel           Seated thoracic extension stretch  (soccer ball)  2x10 (hands clasped and seond round with hands behind head)  2x10 (hands clasped and second round with hands behind head)  2x10 (hands clasped and second round with hands behind head) 2x10 (hands clasped and second round with hands behind head) 2x10 (hands clasped and second round with hands behind head) 2x10 (hands clasped and second round with hands behind head)     Sideying Thoracic Rotation stretch           Cerivcal retraction           Cervical retraction to exntesion           Ther Ex           UBE 2 mins fwd/retro 2 mins fwd/retro 2 mins fwd/retro 2 mins fwd/retro 2 mins fwd/retro 2 mins fwd/retro     Wrist ext/flex DB 4# 30 ea (rolling weights on finger tips during flexion) 4# 30 ea (rolling weights on finger tips during flexion) 4# 30 ea (rolling weights on finger tips during flex) 4# 30 ea  (rolling weights on finger tips during flex) 4# 30 ea (rolling weights on fingers tips during flex)  4# 30 ea (rolling weights on finger tips during flex)     Diggiflex (all, individual) Red (all): 30; yellow individual: 15x Red (all): 30; yellow individual: 10x Red (all): 30; yellow individual: 15x Red (all): 30; Yellow individual: 15x Red (all): 30; Yellow individual: 20x Red (all): 30; Yellow individual: 20x     Metal gripper 50# 30 50# 30 50# 30 50# 30 50# 30 50#     Pronation with supination during elbow flexion 5# 30 5# 30 ea 5# 30 ea 5# 30 ea 5# 30 ea 5# 30 x 3 ea                           Tricep ext           Rows, Ext            Wrist flex, ext stretch            strengthening            No moneys    GTB 5 sec; 2x10 NV? GTB 5 sec; 3x10 GTB 2 sec; 2x10                 Supination stretch           Tricep stretch           Therastick wrist flexion and extension eccentric Red: 5 sec x 30 ea Red: 5 sec x 30 ea Red: 5 sec x 25 ea Red: 5 sec x 30 ea Red: 5 sec x 30 ea Red: 5 sec x 30 ea     Therastick shakes AP, M/L Red: 30 sec x 1 ea (straight arm) ea Red: 30 sec x 1 ea (straight arm) ea Red: 30 sec x 1 ea (straight arm) ea Red: 30 sec x 1 ea (straight arm ea)  Red: 30 sec x 1 ea (straight arm ea) Red: 30 sec x 1 ea (straight arm ea)      Supination and pronation 4# 30 ea 4# 30 ea 4# 30 ea 4# 30 ea 4# 30 ea 4# 30 ea      DB RD ecc 2# 30 2# 30  2# 30 2# 30 2# 30 2# 30      Supination and pronation with Hammer+weight   1.5# ankle weight: 15 ea 1.5# ankle weight: 30 ea 1.5# ankle weight: 20 ea 1.5# ankle weight: 30 ea     Ther Activity            Dunaway carry with slight elevation of shoulders and scap retraction 7# Dbs 3 laps  7# Dbs 3 laps  7# Dbs 3 laps 7# Dbs 3 laps  7# Dbs 3 laps ea 7# DBS 3 laps ea      DB hold with towel       6# 1 min      DB carrying with head of weight     NV?                  Gait Training                                   Modalities            MH

## 2024-08-05 ENCOUNTER — OFFICE VISIT (OUTPATIENT)
Dept: PHYSICAL THERAPY | Facility: CLINIC | Age: 59
End: 2024-08-05
Payer: COMMERCIAL

## 2024-08-05 DIAGNOSIS — M25.521 RIGHT ELBOW PAIN: Primary | ICD-10-CM

## 2024-08-05 PROCEDURE — 97112 NEUROMUSCULAR REEDUCATION: CPT | Performed by: PHYSICAL THERAPIST

## 2024-08-05 PROCEDURE — 97140 MANUAL THERAPY 1/> REGIONS: CPT | Performed by: PHYSICAL THERAPIST

## 2024-08-05 PROCEDURE — 97110 THERAPEUTIC EXERCISES: CPT | Performed by: PHYSICAL THERAPIST

## 2024-08-05 PROCEDURE — 97530 THERAPEUTIC ACTIVITIES: CPT | Performed by: PHYSICAL THERAPIST

## 2024-08-05 NOTE — PROGRESS NOTES
Daily Note     Today's date: 2024  Patient name: Theresa Kim  : 1965  MRN: [id number]  Referring provider: Joselin Brumfield DO  Dx:   Encounter Diagnosis     ICD-10-CM    1. Right elbow pain  M25.521                      Subjective: She has noticed improvements with her strength overall and endurance.        Objective: See treatment diary below      Assessment: Tolerated treatment well; initiated POC with UBE for active warmup. MT performed after receiving consent from pt; she has increased tone along extensor mass today. Vcs provided on proper sequencing with exercises; increased weights throughout program. She denies pain however soreness.   Patient demonstrated fatigue post treatment and would benefit from continued PT      Plan: Continue per plan of care.      Precautions: No past medical history on file. HTN        Access Code: [email or web address]  URL: [email or web address]  Date: 2024  Prepared by: Nancy Colby    Exercises  - Median Nerve Flossing - Tray  - 1 x daily - 7 x weekly - 2 sets - 10 reps  - Ulnar Nerve Flossing  - 1 x daily - 7 x weekly - 2 sets - 10 reps  - Seated Cervical Retraction and Extension  - 1 x daily - 7 x weekly - 3 sets - 10 reps  - Neck Retraction  - 1 x daily - 7 x weekly - 3 sets - 10 reps          Date 2/26 3/1 3/4 3/7 3/12 3/15         Visit # 1 2 3 4 5 6         FOTO done              Re-eval                     Manuals 7/11 7/15 7/18 7/22 7/25 7/29 8    PROM           Tspine           Elbow mobilization           Taping Tricep                       Tricep IASTM           IASTM along extensor mass SMF extensor mass  SMF extensor mass SMF ext and flex mass SMF ext and flex mass SMF ext and flex mass SMF ext and flex mass SMF ext and flex mass    Active release of Extensor mass           Cupping SMF along extensor and flexor mass f/b sliding SMF along extensor and flexor  SMF along ext and flex SMF along ext and flex f/b sliding SMF along ext  mass only f/b sliding SMF along extensio mass only f/b sliding SMF along extension mass only f/b sliding    PA glides cervical            Cervical jt mobs                       Neuro Re-Ed           Rhythmic stab           Prone Ys, Ts, Is           Scap squeezes           Prone ext           Median nerve glides With side bend away: 2x10 With side bend away: 2x10 With side bend away: 2x10 With side bend away: 2x10 With side bend away: 2x10 With side bend kristin: 2x10 With side bend away: 2x10 ea    Ulnar nerve glides           Radial N glides with head turn to the left With side bend away: 2x10 With side bend away: 2x10 With side bend away: 2x10 With side bend away: 2x10 With side bend away: 2x10 With side bend away: 2x10 With side bend away: 2x10    TB B Scap Retraction  BTB 2 sec x 20 BTB 2 sec; 2x15 BTB 2 sec; 2x15 BTB 2 sec; 2x15 BTB 2 sec; 2x15 BTB 2 sec; 2x15  BTB 2 sec; 2x15    TB B ' S Ext BTB 2 sec x 20 BTB 2 sec; 2x15  BTB 2 sec; 2x15 BTB 2 sec; 2x15 BTB 2 sec; 2x15  BTB 2 sec; 2x15 BTB 2 sec; 2x15     Seated thoracic ext           Standing Thoracic Extension with hands behind head with elbows sliding up wall           Bicep stretch using bi[employer]x           Pec stretch in doorway           Elbow Flexion stretch c small towel           Seated thoracic extension stretch  (soccer ball)  2x10 (hands clasped and seond round with hands behind head)  2x10 (hands clasped and second round with hands behind head)  2x10 (hands clasped and second round with hands behind head) 2x10 (hands clasped and second round with hands behind head) 2x10 (hands clasped and second round with hands behind head) 2x10 (hands clasped and second round with hands behind head) 2x10 (hands clasped and second round with hands behind head)    Sideying Thoracic Rotation stretch           Cerivcal retraction           Cervical retraction to exntesion           Ther Ex           UBE 2 mins fwd/retro 2 mins fwd/retro 2 mins fwd/retro 2 mins fwd/retro  2 mins fwd/retro 2 mins fwd/retro 2 mins fwd/retro    Wrist ext/flex DB 4# 30 ea (rolling weights on finger tips during flexion) 4# 30 ea (rolling weights on finger tips during flexion) 4# 30 ea (rolling weights on finger tips during flex) 4# 30 ea (rolling weights on finger tips during flex) 4# 30 ea (rolling weights on fingers tips during flex)  4# 30 ea (rolling weights on finger tips during flex) 4# 30 ea (rolling weights on finger tips during flex)     Diggiflex (all, individual) Red (all): 30; yellow individual: 15x Red (all): 30; yellow individual: 10x Red (all): 30; yellow individual: 15x Red (all): 30; Yellow individual: 15x Red (all): 30; Yellow individual: 20x Red (all): 30; Yellow individual: 20x Red (all): 30; Yellow: Individual: 20x    Metal gripper 50# 30 50# 30 50# 30 50# 30 50# 30 50# 50# 30    Pronation with supination during elbow flexion 5# 30 5# 30 ea 5# 30 ea 5# 30 ea 5# 30 ea 5# 30 x 3 ea 6# 30 ea                          Tricep ext           Rows, Ext            Wrist flex, ext stretch            strengthening            No moneys    GTB 5 sec; 2x10 NV? GTB 5 sec; 3x10 GTB 2 sec; 2x10  NV?               Supination stretch           Tricep stretch           Therastick wrist flexion and extension eccentric Red: 5 sec x 30 ea Red: 5 sec x 30 ea Red: 5 sec x 25 ea Red: 5 sec x 30 ea Red: 5 sec x 30 ea Red: 5 sec x 30 ea Red: 5 sec x 30 ea    Therastick shakes AP, M/L Red: 30 sec x 1 ea (straight arm) ea Red: 30 sec x 1 ea (straight arm) ea Red: 30 sec x 1 ea (straight arm) ea Red: 30 sec x 1 ea (straight arm ea)  Red: 30 sec x 1 ea (straight arm ea) Red: 30 sec x 1 ea (straight arm ea)  Red: sec x 1 ea (straight arm ea)    Supination and pronation 4# 30 ea 4# 30 ea 4# 30 ea 4# 30 ea 4# 30 ea 4# 30 ea  4# 30 ea    DB RD ecc 2# 30 2# 30  2# 30 2# 30 2# 30 2# 30  3# 30 ea    Supination and pronation with Hammer+weight   1.5# ankle weight: 15 ea 1.5# ankle weight: 30 ea 1.5# ankle weight: 20 ea  1.5# ankle weight: 30 ea 1.5# ankle weight: 30 ea    Ther Activity            Dunaway carry with slight elevation of shoulders and scap retraction 7# Dbs 3 laps  7# Dbs 3 laps  7# Dbs 3 laps 7# Dbs 3 laps  7# Dbs 3 laps ea 7# DBS 3 laps ea  8# Dbs 3 laps ea    DB hold with towel       6# 1 min  6# 1 min x 2    DB carrying with head of weight     NV?                  Gait Training                                   Modalities            MH

## 2024-08-08 ENCOUNTER — OFFICE VISIT (OUTPATIENT)
Dept: PHYSICAL THERAPY | Facility: CLINIC | Age: 59
End: 2024-08-08
Payer: COMMERCIAL

## 2024-08-08 DIAGNOSIS — M25.521 RIGHT ELBOW PAIN: Primary | ICD-10-CM

## 2024-08-08 PROCEDURE — 97530 THERAPEUTIC ACTIVITIES: CPT | Performed by: PHYSICAL THERAPIST

## 2024-08-08 PROCEDURE — 97140 MANUAL THERAPY 1/> REGIONS: CPT | Performed by: PHYSICAL THERAPIST

## 2024-08-08 PROCEDURE — 97110 THERAPEUTIC EXERCISES: CPT | Performed by: PHYSICAL THERAPIST

## 2024-08-08 PROCEDURE — 97112 NEUROMUSCULAR REEDUCATION: CPT | Performed by: PHYSICAL THERAPIST

## 2024-08-08 NOTE — PROGRESS NOTES
Daily Note     Today's date: 2024  Patient name: Theresa Kim  : 1965  MRN: [id number]  Referring provider: Joselin Brumfield DO  Dx:   Encounter Diagnosis     ICD-10-CM    1. Right elbow pain  M25.521                      Subjective: patient reports that she was sore after last visit for two days.       Objective: See treatment diary below      Assessment: Tolerated treatment well; initiated POC with UBE for active warmup. Vcs provided on proper sequencing with exercises. MT performed after receiving consent from pt; she has increased tenderness with palpation of extensor mass which improves with STM/TPR. She reports soreness post session and encouraged modalities to use at home.   Patient demonstrated fatigue post treatment and would benefit from continued PT      Plan: Continue per plan of care.      Precautions: No past medical history on file. HTN        Access Code: [email or web address]  URL: [email or web address]  Date: 2024  Prepared by: Nancy Colby    Exercises  - Median Nerve Flossing - Tray  - 1 x daily - 7 x weekly - 2 sets - 10 reps  - Ulnar Nerve Flossing  - 1 x daily - 7 x weekly - 2 sets - 10 reps  - Seated Cervical Retraction and Extension  - 1 x daily - 7 x weekly - 3 sets - 10 reps  - Neck Retraction  - 1 x daily - 7 x weekly - 3 sets - 10 reps          Date 2/26 3/1 3/4 3/7 3/12 3/15         Visit # 1 2 3 4 5 6         FOTO done              Re-eval                     Manuals 7/11 7/15 7/18 7/22 7/25 7/29 8/5 8/8   PROM           Tspine           Elbow mobilization           Taping Tricep                       Tricep IASTM           IASTM along extensor mass SMF extensor mass  SMF extensor mass SMF ext and flex mass SMF ext and flex mass SMF ext and flex mass SMF ext and flex mass SMF ext and flex mass SMF ext and flex mass   Active release of Extensor mass           Cupping SMF along extensor and flexor mass f/b sliding SMF along extensor and flexor  SMF along ext  and flex SMF along ext and flex f/b sliding SMF along ext mass only f/b sliding SMF along extensio mass only f/b sliding SMF along extension mass only f/b sliding SMF along extension mass only -    PA glides cervical            Cervical jt mobs                       Neuro Re-Ed           Rhythmic stab           Prone Ys, Ts, Is           Scap squeezes           Prone ext           Median nerve glides With side bend away: 2x10 With side bend away: 2x10 With side bend away: 2x10 With side bend away: 2x10 With side bend away: 2x10 With side bend kristin: 2x10 With side bend away: 2x10 ea With side bend away: 2x10   Ulnar nerve glides           Radial N glides with head turn to the left With side bend away: 2x10 With side bend away: 2x10 With side bend away: 2x10 With side bend away: 2x10 With side bend away: 2x10 With side bend away: 2x10 With side bend away: 2x10 With side bend away: 2x10   TB B Scap Retraction  BTB 2 sec x 20 BTB 2 sec; 2x15 BTB 2 sec; 2x15 BTB 2 sec; 2x15 BTB 2 sec; 2x15 BTB 2 sec; 2x15  BTB 2 sec; 2x15 BTB 2 sec; 2x15   TB B ' S Ext BTB 2 sec x 20 BTB 2 sec; 2x15  BTB 2 sec; 2x15 BTB 2 sec; 2x15 BTB 2 sec; 2x15  BTB 2 sec; 2x15 BTB 2 sec; 2x15  BTB 2 sec; 2x15   Seated thoracic ext           Standing Thoracic Extension with hands behind head with elbows sliding up wall           Bicep stretch using biodex           Pec stretch in doorway           Elbow Flexion stretch c small towel           Seated thoracic extension stretch  (soccer ball)  2x10 (hands clasped and seond round with hands behind head)  2x10 (hands clasped and second round with hands behind head)  2x10 (hands clasped and second round with hands behind head) 2x10 (hands clasped and second round with hands behind head) 2x10 (hands clasped and second round with hands behind head) 2x10 (hands clasped and second round with hands behind head) 2x10 (hands clasped and second round with hands behind head) 2x10 (hands clasped and second round with  hands behind head)    Sideying Thoracic Rotation stretch           Cerivcal retraction           Cervical retraction to exntesion           Ther Ex           UBE 2 mins fwd/retro 2 mins fwd/retro 2 mins fwd/retro 2 mins fwd/retro 2 mins fwd/retro 2 mins fwd/retro 2 mins fwd/retro 2 mins fwd/retro   Wrist ext/flex DB 4# 30 ea (rolling weights on finger tips during flexion) 4# 30 ea (rolling weights on finger tips during flexion) 4# 30 ea (rolling weights on finger tips during flex) 4# 30 ea (rolling weights on finger tips during flex) 4# 30 ea (rolling weights on fingers tips during flex)  4# 30 ea (rolling weights on finger tips during flex) 4# 30 ea (rolling weights on finger tips during flex)  4# 30 ea (rolling weights on finger tips during flex)   Diggiflex (all, individual) Red (all): 30; yellow individual: 15x Red (all): 30; yellow individual: 10x Red (all): 30; yellow individual: 15x Red (all): 30; Yellow individual: 15x Red (all): 30; Yellow individual: 20x Red (all): 30; Yellow individual: 20x Red (all): 30; Yellow: Individual: 20x Red (all): 30; Yellow: Individual: 15   Metal gripper 50# 30 50# 30 50# 30 50# 30 50# 30 50# 50# 30 50# 30   Pronation with supination during elbow flexion 5# 30 5# 30 ea 5# 30 ea 5# 30 ea 5# 30 ea 5# 30 x 3 ea 6# 30 ea 6# 30 ea                         Tricep ext           Rows, Ext            Wrist flex, ext stretch            strengthening            No moneys    GTB 5 sec; 2x10 NV? GTB 5 sec; 3x10 GTB 2 sec; 2x10  NV? NV?              Supination stretch           Tricep stretch           Therastick wrist flexion and extension eccentric Red: 5 sec x 30 ea Red: 5 sec x 30 ea Red: 5 sec x 25 ea Red: 5 sec x 30 ea Red: 5 sec x 30 ea Red: 5 sec x 30 ea Red: 5 sec x 30 ea Red: 5 sec x 30 ea   Therastick shakes AP, M/L Red: 30 sec x 1 ea (straight arm) ea Red: 30 sec x 1 ea (straight arm) ea Red: 30 sec x 1 ea (straight arm) ea Red: 30 sec x 1 ea (straight arm ea)  Red: 30 sec x 1  ea (straight arm ea) Red: 30 sec x 1 ea (straight arm ea)  Red: sec x 1 ea (straight arm ea) Red: 30 sec x 1 ea (Straight arm ea)   Supination and pronation 4# 30 ea 4# 30 ea 4# 30 ea 4# 30 ea 4# 30 ea 4# 30 ea  4# 30 ea 4# 30 ea   DB RD ecc 2# 30 2# 30  2# 30 2# 30 2# 30 2# 30  3# 30 ea 3# 30 ea   Supination and pronation with Hammer+weight   1.5# ankle weight: 15 ea 1.5# ankle weight: 30 ea 1.5# ankle weight: 20 ea 1.5# ankle weight: 30 ea 1.5# ankle weight: 30 ea 1.5# ankle weight: 30 ea   Ther Activity            Dunaway carry with slight elevation of shoulders and scap retraction 7# Dbs 3 laps  7# Dbs 3 laps  7# Dbs 3 laps 7# Dbs 3 laps  7# Dbs 3 laps ea 7# DBS 3 laps ea  8# Dbs 3 laps ea 8# Dbs: 3 laps ea   DB hold with towel       6# 1 min  6# 1 min x 2 6# 2 min x 1   DB carrying with head of weight     NV?                  Gait Training                                   Modalities            MH

## 2024-08-12 ENCOUNTER — APPOINTMENT (OUTPATIENT)
Dept: PHYSICAL THERAPY | Facility: CLINIC | Age: 59
End: 2024-08-12
Payer: COMMERCIAL

## 2024-08-15 ENCOUNTER — OFFICE VISIT (OUTPATIENT)
Dept: PHYSICAL THERAPY | Facility: CLINIC | Age: 59
End: 2024-08-15
Payer: COMMERCIAL

## 2024-08-15 DIAGNOSIS — M25.521 RIGHT ELBOW PAIN: Primary | ICD-10-CM

## 2024-08-15 PROCEDURE — 97140 MANUAL THERAPY 1/> REGIONS: CPT | Performed by: PHYSICAL THERAPIST

## 2024-08-15 PROCEDURE — 97110 THERAPEUTIC EXERCISES: CPT | Performed by: PHYSICAL THERAPIST

## 2024-08-15 PROCEDURE — 97112 NEUROMUSCULAR REEDUCATION: CPT | Performed by: PHYSICAL THERAPIST

## 2024-08-15 PROCEDURE — 97530 THERAPEUTIC ACTIVITIES: CPT | Performed by: PHYSICAL THERAPIST

## 2024-08-15 NOTE — PROGRESS NOTES
Daily Note     Today's date: 8/15/2024  Patient name: Theresa Kim  : 1965  MRN: [id number]  Referring provider: Joselin Brumfield DO  Dx:   Encounter Diagnosis     ICD-10-CM    1. Right elbow pain  M25.521                      Subjective: patient reports that she feels the same but does feel better with therapy.       Objective: See treatment diary below      Assessment: Tolerated treatment well; initiated POC with UBE for active warmup. Vcs provided on proper sequencing with exercises. MT performed after receiving consent from pt; she has increased tone with bilateral extensor mass and flexor mass which improves with STM. Concluded with static stretching. Patient demonstrated fatigue post treatment and would benefit from continued PT      Plan: Continue per plan of care.      Precautions: No past medical history on file. HTN        Access Code: [email or web address]  URL: [email or web address]  Date: 2024  Prepared by: Nancy Colby    Exercises  - Median Nerve Flossing - Tray  - 1 x daily - 7 x weekly - 2 sets - 10 reps  - Ulnar Nerve Flossing  - 1 x daily - 7 x weekly - 2 sets - 10 reps  - Seated Cervical Retraction and Extension  - 1 x daily - 7 x weekly - 3 sets - 10 reps  - Neck Retraction  - 1 x daily - 7 x weekly - 3 sets - 10 reps          Date 2/26 3/1 3/4 3/7 3/12 3/15         Visit # 1 2 3 4 5 6         FOTO done              Re-eval                     Manuals 8/15   7/22 7/25 7/29 8/5 8/8   PROM           Tspine           Elbow mobilization           Taping Tricep                       Tricep IASTM           IASTM along extensor mass    SMF ext and flex mass SMF ext and flex mass SMF ext and flex mass SMF ext and flex mass SMF ext and flex mass   Active release of Extensor mass           Cupping    SMF along ext and flex f/b sliding SMF along ext mass only f/b sliding SMF along extensio mass only f/b sliding SMF along extension mass only f/b sliding SMF along extension mass  only -    PA glides cervical            Cervical jt mobs                       Neuro Re-Ed           Rhythmic stab           Prone Ys, Ts, Is           Scap squeezes           Prone ext           Median nerve glides With side bend away: 2x10   With side bend away: 2x10 With side bend away: 2x10 With side bend kristin: 2x10 With side bend away: 2x10 ea With side bend away: 2x10   Ulnar nerve glides           Radial N glides with head turn to the left With side bend away: 2x10   With side bend away: 2x10 With side bend away: 2x10 With side bend away: 2x10 With side bend away: 2x10 With side bend away: 2x10   TB B Scap Retraction  NV   BTB 2 sec; 2x15 BTB 2 sec; 2x15 BTB 2 sec; 2x15  BTB 2 sec; 2x15 BTB 2 sec; 2x15   TB B ' S Ext NV   BTB 2 sec; 2x15 BTB 2 sec; 2x15  BTB 2 sec; 2x15 BTB 2 sec; 2x15  BTB 2 sec; 2x15   Seated thoracic ext           Standing Thoracic Extension with hands behind head with elbows sliding up wall           Bicep stretch using [employer]           Pec stretch in doorway           Elbow Flexion stretch c small towel           Seated thoracic extension stretch  (soccer ball)  2x10 (hands clasped and second round with hands behind head)    2x10 (hands clasped and second round with hands behind head) 2x10 (hands clasped and second round with hands behind head) 2x10 (hands clasped and second round with hands behind head) 2x10 (hands clasped and second round with hands behind head) 2x10 (hands clasped and second round with hands behind head)    Sideying Thoracic Rotation stretch           Cerivcal retraction           Cervical retraction to exntesion           Ther Ex           UBE 2 mins fwd/retro   2 mins fwd/retro 2 mins fwd/retro 2 mins fwd/retro 2 mins fwd/retro 2 mins fwd/retro   Wrist ext/flex DB 4# 30 ea (rolling weights on finger tips during flex)   4# 30 ea (rolling weights on finger tips during flex) 4# 30 ea (rolling weights on fingers tips during flex)  4# 30 ea (rolling weights on finger tips  during flex) 4# 30 ea (rolling weights on finger tips during flex)  4# 30 ea (rolling weights on finger tips during flex)   Diggiflex (all, individual) Red (all): 30; Yellow: individual 30   Red (all): 30; Yellow individual: 15x Red (all): 30; Yellow individual: 20x Red (all): 30; Yellow individual: 20x Red (all): 30; Yellow: Individual: 20x Red (all): 30; Yellow: Individual: 15   Metal gripper 50# 30   50# 30 50# 30 50# 50# 30 50# 30   Pronation with supination during elbow flexion    5# 30 ea 5# 30 ea 5# 30 x 3 ea 6# 30 ea 6# 30 ea                         Tricep ext           Rows, Ext            Wrist flex, ext stretch            strengthening            No moneys  GTB 2 sec x 20   NV? GTB 5 sec; 3x10 GTB 2 sec; 2x10  NV? NV?              Supination stretch           Tricep stretch           Therastick wrist flexion and extension eccentric Red: 5 sec x 30 ea   Red: 5 sec x 30 ea Red: 5 sec x 30 ea Red: 5 sec x 30 ea Red: 5 sec x 30 ea Red: 5 sec x 30 ea   Therastick shakes AP, M/L Red: 30 sec x 1 ea (Straight arm ea)   Red: 30 sec x 1 ea (straight arm ea)  Red: 30 sec x 1 ea (straight arm ea) Red: 30 sec x 1 ea (straight arm ea)  Red: sec x 1 ea (straight arm ea) Red: 30 sec x 1 ea (Straight arm ea)   Supination and pronation 4# 30 ea   4# 30 ea 4# 30 ea 4# 30 ea  4# 30 ea 4# 30 ea   DB RD ecc 3# 30 ea   2# 30 2# 30 2# 30  3# 30 ea 3# 30 ea   Supination and pronation with Hammer+weight NV?   1.5# ankle weight: 30 ea 1.5# ankle weight: 20 ea 1.5# ankle weight: 30 ea 1.5# ankle weight: 30 ea 1.5# ankle weight: 30 ea   Ther Activity            Dunaway carry with slight elevation of shoulders and scap retraction NV?   7# Dbs 3 laps  7# Dbs 3 laps ea 7# DBS 3 laps ea  8# Dbs 3 laps ea 8# Dbs: 3 laps ea   DB hold with towel  NV?     6# 1 min  6# 1 min x 2 6# 2 min x 1   DB carrying with head of weight     NV?                  Gait Training                                   Modalities            MH

## 2024-08-19 ENCOUNTER — OFFICE VISIT (OUTPATIENT)
Dept: PHYSICAL THERAPY | Facility: CLINIC | Age: 59
End: 2024-08-19
Payer: COMMERCIAL

## 2024-08-19 DIAGNOSIS — M25.521 RIGHT ELBOW PAIN: Primary | ICD-10-CM

## 2024-08-19 PROCEDURE — 97112 NEUROMUSCULAR REEDUCATION: CPT | Performed by: PHYSICAL THERAPIST

## 2024-08-19 PROCEDURE — 97140 MANUAL THERAPY 1/> REGIONS: CPT | Performed by: PHYSICAL THERAPIST

## 2024-08-19 PROCEDURE — 97110 THERAPEUTIC EXERCISES: CPT | Performed by: PHYSICAL THERAPIST

## 2024-08-19 NOTE — PROGRESS NOTES
Daily Note     Today's date: 2024  Patient name: Theresa Kim  : 1965  MRN: [id number]  Referring provider: Joselin Brumfield DO  Dx:   Encounter Diagnosis     ICD-10-CM    1. Right elbow pain  M25.521                      Subjective: She reports that she was sore after last visit.       Objective: See treatment diary below      Assessment: Tolerated treatment well; initiated POC with table slides for shoulder warmup. Vcs provided on proper sequencing with exercises; resumed mid back strengthening exercises today. She reports having fatigue post sessions and soreness. Concluded with nerve glides and mid back stretching. Patient demonstrated fatigue post treatment and would benefit from continued PT      Plan: Continue per plan of care.      Precautions: No past medical history on file. HTN        Access Code: [email or web address]  URL: [email or web address]  Date: 2024  Prepared by: Nancy Colby    Exercises  - Median Nerve Flossing - Tray  - 1 x daily - 7 x weekly - 2 sets - 10 reps  - Ulnar Nerve Flossing  - 1 x daily - 7 x weekly - 2 sets - 10 reps  - Seated Cervical Retraction and Extension  - 1 x daily - 7 x weekly - 3 sets - 10 reps  - Neck Retraction  - 1 x daily - 7 x weekly - 3 sets - 10 reps          Date 2/26 3/1 3/4 3/7 3/12 3/15         Visit # 1 2 3 4 5 6         FOTO done              Re-eval                     Manuals 8/15 8/19   7/25 7/29 8/5 8/8   PROM           Tspine           Elbow mobilization           Taping Tricep                       Tricep IASTM           IASTM along extensor mass SMF ext and flex mass SMF ext and flex mass   SMF ext and flex mass SMF ext and flex mass SMF ext and flex mass SMF ext and flex mass   Active release of Extensor mass           Cupping SMF SMF along ext mass only   SMF along ext mass only f/b sliding SMF along extensio mass only f/b sliding SMF along extension mass only f/b sliding SMF along extension mass only -    PA glides  cervical            Cervical jt mobs                       Neuro Re-Ed           Rhythmic stab           Prone Ys, Ts, Is           Scap squeezes           Prone ext           Median nerve glides With side bend away: 2x10 With side bend away: 2x10   With side bend away: 2x10 With side bend kristin: 2x10 With side bend away: 2x10 ea With side bend away: 2x10   Ulnar nerve glides           Radial N glides with head turn to the left With side bend away: 2x10 With side bend away: 2x10   With side bend away: 2x10 With side bend away: 2x10 With side bend away: 2x10 With side bend away: 2x10   TB B Scap Retraction  NV BTB 2 sec; 2x15   BTB 2 sec; 2x15 BTB 2 sec; 2x15  BTB 2 sec; 2x15 BTB 2 sec; 2x15   TB B ' S Ext NV BTB 2 sec; 2x15   BTB 2 sec; 2x15  BTB 2 sec; 2x15 BTB 2 sec; 2x15  BTB 2 sec; 2x15   Seated thoracic ext           Standing Thoracic Extension with hands behind head with elbows sliding up wall           Bicep stretch using [employer]           Pec stretch in doorway           Elbow Flexion stretch c small towel           Seated thoracic extension stretch  (soccer ball)  2x10 (hands clasped and second round with hands behind head)  2x10 (hands clasped and second roung with hands behind head)   2x10 (hands clasped and second round with hands behind head) 2x10 (hands clasped and second round with hands behind head) 2x10 (hands clasped and second round with hands behind head) 2x10 (hands clasped and second round with hands behind head)    Sideying Thoracic Rotation stretch           Cerivcal retraction           Cervical retraction to exntesion           Ther Ex           UBE 2 mins fwd/retro 2 mins fwd/retro   2 mins fwd/retro 2 mins fwd/retro 2 mins fwd/retro 2 mins fwd/retro   Wrist ext/flex DB 4# 30 ea (rolling weights on finger tips during flex) 4# 30 ea (rolling weights on finger tips during flex)   4# 30 ea (rolling weights on fingers tips during flex)  4# 30 ea (rolling weights on finger tips during flex) 4# 30  ea (rolling weights on finger tips during flex)  4# 30 ea (rolling weights on finger tips during flex)   Diggiflex (all, individual) Red (all): 30; Yellow: individual 30 Red (all): 30; Yellow: individual: 30   Red (all): 30; Yellow individual: 20x Red (all): 30; Yellow individual: 20x Red (all): 30; Yellow: Individual: 20x Red (all): 30; Yellow: Individual: 15   Metal gripper 50# 30 50# 30   50# 30 50# 50# 30 50# 30   Pronation with supination during elbow flexion     5# 30 ea 5# 30 x 3 ea 6# 30 ea 6# 30 ea                         Tricep ext           Rows, Ext            Wrist flex, ext stretch            strengthening            No moneys  GTB 2 sec x 20 NV?   GTB 5 sec; 3x10 GTB 2 sec; 2x10  NV? NV?              Supination stretch           Tricep stretch           Therastick wrist flexion and extension eccentric Red: 5 sec x 30 ea Red: 5 sec x 30 ea   Red: 5 sec x 30 ea Red: 5 sec x 30 ea Red: 5 sec x 30 ea Red: 5 sec x 30 ea   Therastick shakes AP, M/L Red: 30 sec x 1 ea (Straight arm ea) Red: 40 sec x 1 ea (straight arm ea)   Red: 30 sec x 1 ea (straight arm ea) Red: 30 sec x 1 ea (straight arm ea)  Red: sec x 1 ea (straight arm ea) Red: 30 sec x 1 ea (Straight arm ea)   Supination and pronation 4# 30 ea 4# 30 ea   4# 30 ea 4# 30 ea  4# 30 ea 4# 30 ea   DB RD ecc 3# 30 ea 3# 30 ea   2# 30 2# 30  3# 30 ea 3# 30 ea   Supination and pronation with Hammer+weight NV? NV?   1.5# ankle weight: 20 ea 1.5# ankle weight: 30 ea 1.5# ankle weight: 30 ea 1.5# ankle weight: 30 ea   Ther Activity            Dunaway carry with slight elevation of shoulders and scap retraction NV? 8# Dbs: 3 laps ea   7# Dbs 3 laps ea 7# DBS 3 laps ea  8# Dbs 3 laps ea 8# Dbs: 3 laps ea   DB hold with towel  NV? NV?    6# 1 min  6# 1 min x 2 6# 2 min x 1   DB carrying with head of weight     NV?                  Gait Training                                   Modalities            MH

## 2024-08-22 ENCOUNTER — OFFICE VISIT (OUTPATIENT)
Dept: PHYSICAL THERAPY | Facility: CLINIC | Age: 59
End: 2024-08-22
Payer: COMMERCIAL

## 2024-08-22 DIAGNOSIS — M25.521 RIGHT ELBOW PAIN: Primary | ICD-10-CM

## 2024-08-22 PROCEDURE — 97112 NEUROMUSCULAR REEDUCATION: CPT | Performed by: PHYSICAL THERAPIST

## 2024-08-22 PROCEDURE — 97140 MANUAL THERAPY 1/> REGIONS: CPT | Performed by: PHYSICAL THERAPIST

## 2024-08-22 PROCEDURE — 97110 THERAPEUTIC EXERCISES: CPT | Performed by: PHYSICAL THERAPIST

## 2024-08-22 NOTE — PROGRESS NOTES
Daily Note     Today's date: 2024  Patient name: Theresa Kim  : 1965  MRN: [id number]  Referring provider: Joselin Brumfield DO  Dx:   Encounter Diagnosis     ICD-10-CM    1. Right elbow pain  M25.521                      Subjective: She reports that she was sore after last visit.       Objective: See treatment diary below      Assessment: Tolerated treatment well; initiated POC with UBE for active warmup. Vcs provided on proper sequencing with exercises.  MT performed after receiving consent from pt; she has increased palpable tenderness in flexor muscle belly and increased with extensor origin site both of which improved with STM/TPR. She reports feeling stretched post session. Patient demonstrated fatigue post treatment and would benefit from continued PT      Plan: Continue per plan of care.      Precautions: No past medical history on file. HTN        Access Code: [email or web address]  URL: [email or web address]  Date: 2024  Prepared by: Nancy Colby    Exercises  - Median Nerve Flossing - Tray  - 1 x daily - 7 x weekly - 2 sets - 10 reps  - Ulnar Nerve Flossing  - 1 x daily - 7 x weekly - 2 sets - 10 reps  - Seated Cervical Retraction and Extension  - 1 x daily - 7 x weekly - 3 sets - 10 reps  - Neck Retraction  - 1 x daily - 7 x weekly - 3 sets - 10 reps          Date 2/26 3/1 3/4 3/7 3/12 3/15         Visit # 1 2 3 4 5 6         FOTO done              Re-eval                     Manuals 8/15 8/19 8/22   7/29 8/5 8/8   PROM           Tspine           Elbow mobilization           Taping Tricep                       Tricep IASTM           IASTM along extensor mass SMF ext and flex mass SMF ext and flex mass SMF ext ad flex mass   SMF ext and flex mass SMF ext and flex mass SMF ext and flex mass   Active release of Extensor mass           Cupping SMF SMF along ext mass only SMF along ext mass only    SMF along extensio mass only f/b sliding SMF along extension mass only f/b  sliding SMF along extension mass only -    PA glides cervical            Cervical jt mobs                       Neuro Re-Ed           Rhythmic stab           Prone Ys, Ts, Is           Scap squeezes           Prone ext           Median nerve glides With side bend away: 2x10 With side bend away: 2x10 With side bend away: 2x10   With side bend kristin: 2x10 With side bend away: 2x10 ea With side bend away: 2x10   Ulnar nerve glides           Radial N glides with head turn to the left With side bend away: 2x10 With side bend away: 2x10 With side bend away: 2x10   With side bend away: 2x10 With side bend away: 2x10 With side bend away: 2x10   TB B Scap Retraction  NV BTB 2 sec; 2x15 NV?   BTB 2 sec; 2x15  BTB 2 sec; 2x15 BTB 2 sec; 2x15   TB B ' S Ext NV BTB 2 sec; 2x15 NV?   BTB 2 sec; 2x15 BTB 2 sec; 2x15  BTB 2 sec; 2x15   Seated thoracic ext           Standing Thoracic Extension with hands behind head with elbows sliding up wall           Bicep stretch using [email or web address]           Pec stretch in doorway           Elbow Flexion stretch c small towel           Seated thoracic extension stretch  (soccer ball)  2x10 (hands clasped and second round with hands behind head)  2x10 (hands clasped and second roung with hands behind head) 2x10 (hands clasped and second round with hands behind head)   2x10 (hands clasped and second round with hands behind head) 2x10 (hands clasped and second round with hands behind head) 2x10 (hands clasped and second round with hands behind head)    Sideying Thoracic Rotation stretch           Cerivcal retraction           Cervical retraction to exntesion           Ther Ex           UBE 2 mins fwd/retro 2 mins fwd/retro 2 mins fwd/ retro   2 mins fwd/retro 2 mins fwd/retro 2 mins fwd/retro   Wrist ext/flex DB 4# 30 ea (rolling weights on finger tips during flex) 4# 30 ea (rolling weights on finger tips during flex) 4# 30 ea (rolling weights on finger tips during flex)   4# 30 ea (rolling weights on  finger tips during flex) 4# 30 ea (rolling weights on finger tips during flex)  4# 30 ea (rolling weights on finger tips during flex)   Diggiflex (all, individual) Red (all): 30; Yellow: individual 30 Red (all): 30; Yellow: individual: 30 NV?   Red (all): 30; Yellow individual: 20x Red (all): 30; Yellow: Individual: 20x Red (all): 30; Yellow: Individual: 15   Metal gripper 50# 30 50# 30 50# 30   50# 50# 30 50# 30   Pronation with supination during elbow flexion      5# 30 x 3 ea 6# 30 ea 6# 30 ea                         Tricep ext           Rows, Ext            Wrist flex, ext stretch            strengthening            No moneys  GTB 2 sec x 20 NV? NV?   GTB 2 sec; 2x10  NV? NV?              Supination stretch           Tricep stretch           Therastick wrist flexion and extension eccentric Red: 5 sec x 30 ea Red: 5 sec x 30 ea Red: 5 sec x 30 ea   Red: 5 sec x 30 ea Red: 5 sec x 30 ea Red: 5 sec x 30 ea   Therastick shakes AP, M/L Red: 30 sec x 1 ea (Straight arm ea) Red: 40 sec x 1 ea (straight arm ea) Red: 40 sec x 1 ea (straight arm ea)   Red: 30 sec x 1 ea (straight arm ea)  Red: sec x 1 ea (straight arm ea) Red: 30 sec x 1 ea (Straight arm ea)   Supination and pronation 4# 30 ea 4# 30 ea 4# 30 ea   4# 30 ea  4# 30 ea 4# 30 ea   DB RD ecc 3# 30 ea 3# 30 ea 3# 30 ea   2# 30  3# 30 ea 3# 30 ea   Supination and pronation with Hammer+weight NV? NV? 1.5# ankle weihr: 30 ea   1.5# ankle weight: 30 ea 1.5# ankle weight: 30 ea 1.5# ankle weight: 30 ea   Ther Activity            Dunaway carry with slight elevation of shoulders and scap retraction NV? 8# Dbs: 3 laps ea 8# Dbs: 3 laps ea   7# DBS 3 laps ea  8# Dbs 3 laps ea 8# Dbs: 3 laps ea   DB hold with towel  NV? NV? NV?   6# 1 min  6# 1 min x 2 6# 2 min x 1   DB carrying with head of weight                       Gait Training                                   Modalities            MH

## 2024-08-26 ENCOUNTER — OFFICE VISIT (OUTPATIENT)
Dept: PHYSICAL THERAPY | Facility: CLINIC | Age: 59
End: 2024-08-26
Payer: COMMERCIAL

## 2024-08-26 DIAGNOSIS — M25.521 RIGHT ELBOW PAIN: Primary | ICD-10-CM

## 2024-08-26 PROCEDURE — 97110 THERAPEUTIC EXERCISES: CPT | Performed by: PHYSICAL THERAPIST

## 2024-08-26 PROCEDURE — 97112 NEUROMUSCULAR REEDUCATION: CPT | Performed by: PHYSICAL THERAPIST

## 2024-08-26 PROCEDURE — 97140 MANUAL THERAPY 1/> REGIONS: CPT | Performed by: PHYSICAL THERAPIST

## 2024-08-26 NOTE — PROGRESS NOTES
Daily Note     Today's date: 2024  Patient name: Theresa Kim  : 1965  MRN: [id number]  Referring provider: Joselin Brumfield DO  Dx:   Encounter Diagnosis     ICD-10-CM    1. Right elbow pain  M25.521                      Subjective: She reports feeling sore after last visit.       Objective: See treatment diary below      Assessment: Tolerated treatment well; initiated POC with UBE for active warmup. MT performed after receiving consent from pt; she has increased tone along extensor mass. Vcs provided on proper sequencing with exercises; modified program today for time. She felt looser post session. Patient demonstrated fatigue post treatment and would benefit from continued PT      Plan: Continue per plan of care.      Precautions: No past medical history on file. HTN        Access Code: [email or web address]  URL: [email or web address]  Date: 2024  Prepared by: Nancy Colby    Exercises  - Median Nerve Flossing - Tray  - 1 x daily - 7 x weekly - 2 sets - 10 reps  - Ulnar Nerve Flossing  - 1 x daily - 7 x weekly - 2 sets - 10 reps  - Seated Cervical Retraction and Extension  - 1 x daily - 7 x weekly - 3 sets - 10 reps  - Neck Retraction  - 1 x daily - 7 x weekly - 3 sets - 10 reps          Date 2/26 3/1 3/4 3/7 3/12 3/15         Visit # 1 2 3 4 5 6         FOTO done              Re-eval                     Manuals 8/15 8/19 8/22 8/26  7/29 8/5 8/8   PROM           Tspine           Elbow mobilization           Taping Tricep                       Tricep IASTM           IASTM along extensor mass SMF ext and flex mass SMF ext and flex mass SMF ext ad flex mass SMF ext and flex mass  SMF ext and flex mass SMF ext and flex mass SMF ext and flex mass   Active release of Extensor mass           Cupping SMF SMF along ext mass only SMF along ext mass only  SMF along ext mass only  SMF along extensio mass only f/b sliding SMF along extension mass only f/b sliding SMF along extension mass only -     PA glides cervical            Cervical jt mobs                       Neuro Re-Ed           Rhythmic stab           Prone Ys, Ts, Is           Scap squeezes           Prone ext           Median nerve glides With side bend away: 2x10 With side bend away: 2x10 With side bend away: 2x10 NV?  With side bend kristin: 2x10 With side bend away: 2x10 ea With side bend away: 2x10   Ulnar nerve glides           Radial N glides with head turn to the left With side bend away: 2x10 With side bend away: 2x10 With side bend away: 2x10 NV?  With side bend away: 2x10 With side bend away: 2x10 With side bend away: 2x10   TB B Scap Retraction  NV BTB 2 sec; 2x15 NV? NV?  BTB 2 sec; 2x15  BTB 2 sec; 2x15 BTB 2 sec; 2x15   TB B ' S Ext NV BTB 2 sec; 2x15 NV? NV?  BTB 2 sec; 2x15 BTB 2 sec; 2x15  BTB 2 sec; 2x15   Seated thoracic ext           Standing Thoracic Extension with hands behind head with elbows sliding up wall           Bicep stretch using [employer]           Pec stretch in doorway           Elbow Flexion stretch c small towel           Seated thoracic extension stretch  (soccer ball)  2x10 (hands clasped and second round with hands behind head)  2x10 (hands clasped and second roung with hands behind head) 2x10 (hands clasped and second round with hands behind head) 2x10 (hands clasped and second round with hands behind head)  2x10 (hands clasped and second round with hands behind head) 2x10 (hands clasped and second round with hands behind head) 2x10 (hands clasped and second round with hands behind head)    Sideying Thoracic Rotation stretch           Cerivcal retraction           Cervical retraction to exntesion           Ther Ex           UBE 2 mins fwd/retro 2 mins fwd/retro 2 mins fwd/ retro 2 mins fwd/retro  2 mins fwd/retro 2 mins fwd/retro 2 mins fwd/retro   Wrist ext/flex DB 4# 30 ea (rolling weights on finger tips during flex) 4# 30 ea (rolling weights on finger tips during flex) 4# 30 ea (rolling weights on finger tips  during flex) 4# 30 ea (rolling weights on finger tips during flex)  4# 30 ea (rolling weights on finger tips during flex) 4# 30 ea (rolling weights on finger tips during flex)  4# 30 ea (rolling weights on finger tips during flex)   Diggiflex (all, individual) Red (all): 30; Yellow: individual 30 Red (all): 30; Yellow: individual: 30 NV? NV?  Red (all): 30; Yellow individual: 20x Red (all): 30; Yellow: Individual: 20x Red (all): 30; Yellow: Individual: 15   Metal gripper 50# 30 50# 30 50# 30 NV?  50# 50# 30 50# 30   Pronation with supination during elbow flexion      5# 30 x 3 ea 6# 30 ea 6# 30 ea                         Tricep ext           Rows, Ext            Wrist flex, ext stretch            strengthening            No moneys  GTB 2 sec x 20 NV? NV? NV?  GTB 2 sec; 2x10  NV? NV?              Supination stretch           Tricep stretch           Therastick wrist flexion and extension eccentric Red: 5 sec x 30 ea Red: 5 sec x 30 ea Red: 5 sec x 30 ea Red: 5 sec x 30 ea  Red: 5 sec x 30 ea Red: 5 sec x 30 ea Red: 5 sec x 30 ea   Therastick shakes AP, M/L Red: 30 sec x 1 ea (Straight arm ea) Red: 40 sec x 1 ea (straight arm ea) Red: 40 sec x 1 ea (straight arm ea) Red: 40 sec x 1 ea (straight arm ea)   Red: 30 sec x 1 ea (straight arm ea)  Red: sec x 1 ea (straight arm ea) Red: 30 sec x 1 ea (Straight arm ea)   Supination and pronation 4# 30 ea 4# 30 ea 4# 30 ea 4# 30 ea   4# 30 ea  4# 30 ea 4# 30 ea   DB RD ecc 3# 30 ea 3# 30 ea 3# 30 ea 3# 30 ea  2# 30  3# 30 ea 3# 30 ea   Supination and pronation with Hammer+weight NV? NV? 1.5# ankle weihr: 30 ea NV?  1.5# ankle weight: 30 ea 1.5# ankle weight: 30 ea 1.5# ankle weight: 30 ea   Ther Activity            Dunaway carry with slight elevation of shoulders and scap retraction NV? 8# Dbs: 3 laps ea 8# Dbs: 3 laps ea NV?  7# DBS 3 laps ea  8# Dbs 3 laps ea 8# Dbs: 3 laps ea   DB hold with towel  NV? NV? NV? NV?  6# 1 min  6# 1 min x 2 6# 2 min x 1   DB carrying with  head of weight                       Gait Training                                   Modalities            MH

## 2024-08-29 ENCOUNTER — OFFICE VISIT (OUTPATIENT)
Dept: PHYSICAL THERAPY | Facility: CLINIC | Age: 59
End: 2024-08-29
Payer: COMMERCIAL

## 2024-08-29 DIAGNOSIS — M25.521 RIGHT ELBOW PAIN: Primary | ICD-10-CM

## 2024-08-29 PROCEDURE — 97110 THERAPEUTIC EXERCISES: CPT | Performed by: PHYSICAL THERAPIST

## 2024-08-29 PROCEDURE — 97112 NEUROMUSCULAR REEDUCATION: CPT | Performed by: PHYSICAL THERAPIST

## 2024-08-29 PROCEDURE — 97140 MANUAL THERAPY 1/> REGIONS: CPT | Performed by: PHYSICAL THERAPIST

## 2024-08-29 NOTE — PROGRESS NOTES
Daily Note     Today's date: 2024  Patient name: Theresa Kim  : 1965  MRN: [id number]  Referring provider: Joselin Brumfield DO  Dx:   Encounter Diagnosis     ICD-10-CM    1. Right elbow pain  M25.521                      Subjective: She reports that she was sore after last visit.       Objective: See treatment diary below      Assessment: Tolerated treatment well; initiated POC with UBE for active warmup. Vcs provided on proper sequencing with exercises; resumed TB B ER. She denies having increased pain throughout session. MT performed after receiving consent from pt; STM/TPR extensor mass due to increased palpable tenderness which improves post MT.  Patient demonstrated fatigue post treatment and would benefit from continued PT      Plan: Continue per plan of care.      Precautions: No past medical history on file. HTN        Access Code: [email or web address]  URL: [email or web address]  Date: 2024  Prepared by: Nancy Colby    Exercises  - Median Nerve Flossing - Tray  - 1 x daily - 7 x weekly - 2 sets - 10 reps  - Ulnar Nerve Flossing  - 1 x daily - 7 x weekly - 2 sets - 10 reps  - Seated Cervical Retraction and Extension  - 1 x daily - 7 x weekly - 3 sets - 10 reps  - Neck Retraction  - 1 x daily - 7 x weekly - 3 sets - 10 reps          Date 2/26 3/1 3/4 3/7 3/12 3/15         Visit # 1 2 3 4 5 6         FOTO done              Re-eval                     Manuals 8/15 8/19 8/22 8/26 8/29  8/5 8/8   PROM           Tspine           Elbow mobilization           Taping Tricep                       Tricep IASTM           IASTM along extensor mass SMF ext and flex mass SMF ext and flex mass SMF ext ad flex mass SMF ext and flex mass SMF ext mass  SMF ext and flex mass SMF ext and flex mass   Active release of Extensor mass           Cupping SMF SMF along ext mass only SMF along ext mass only  SMF along ext mass only SMF along ext mass only  SMF along extension mass only f/b sliding SMF  along extension mass only -    PA glides cervical            Cervical jt mobs                       Neuro Re-Ed           Rhythmic stab           Prone Ys, Ts, Is           Scap squeezes           Prone ext           Median nerve glides With side bend away: 2x10 With side bend away: 2x10 With side bend away: 2x10 NV? With side bend away: 2x10  With side bend away: 2x10 ea With side bend away: 2x10   Ulnar nerve glides           Radial N glides with head turn to the left With side bend away: 2x10 With side bend away: 2x10 With side bend away: 2x10 NV? With side bend away: 2x10  With side bend away: 2x10 With side bend away: 2x10   TB B Scap Retraction  NV BTB 2 sec; 2x15 NV? NV? NV?  BTB 2 sec; 2x15 BTB 2 sec; 2x15   TB B ' S Ext NV BTB 2 sec; 2x15 NV? NV? NV?  BTB 2 sec; 2x15  BTB 2 sec; 2x15   Seated thoracic ext           Standing Thoracic Extension with hands behind head with elbows sliding up wall           Bicep stretch using [employer]           Pec stretch in doorway           Elbow Flexion stretch c small towel           Seated thoracic extension stretch  (soccer ball)  2x10 (hands clasped and second round with hands behind head)  2x10 (hands clasped and second roung with hands behind head) 2x10 (hands clasped and second round with hands behind head) 2x10 (hands clasped and second round with hands behind head) 2x10 (hands clasped and second round with hands behind head)   2x10 (hands clasped and second round with hands behind head) 2x10 (hands clasped and second round with hands behind head)    Sideying Thoracic Rotation stretch           Cerivcal retraction           Cervical retraction to exntesion           Ther Ex           UBE 2 mins fwd/retro 2 mins fwd/retro 2 mins fwd/ retro 2 mins fwd/retro 2 mins fwd/retro  2 mins fwd/retro 2 mins fwd/retro   Wrist ext/flex DB 4# 30 ea (rolling weights on finger tips during flex) 4# 30 ea (rolling weights on finger tips during flex) 4# 30 ea (rolling weights on finger  tips during flex) 4# 30 ea (rolling weights on finger tips during flex) 4# 30 ea (rolling on finger tips during flex)   4# 30 ea (rolling weights on finger tips during flex)  4# 30 ea (rolling weights on finger tips during flex)   Diggiflex (all, individual) Red (all): 30; Yellow: individual 30 Red (all): 30; Yellow: individual: 30 NV? NV? Red (all): 30; Yellow: (individual): 30  Red (all): 30; Yellow: Individual: 20x Red (all): 30; Yellow: Individual: 15   Metal gripper 50# 30 50# 30 50# 30 NV? 50# 30  50# 30 50# 30   Pronation with supination during elbow flexion       6# 30 ea 6# 30 ea                         Tricep ext           Rows, Ext            Wrist flex, ext stretch            strengthening            No moneys  GTB 2 sec x 20 NV? NV? NV? GTB 2 sec x 20   NV? NV?              Supination stretch           Tricep stretch           Therastick wrist flexion and extension eccentric Red: 5 sec x 30 ea Red: 5 sec x 30 ea Red: 5 sec x 30 ea Red: 5 sec x 30 ea Red: 5 sec x 30 ea  Red: 5 sec x 30 ea Red: 5 sec x 30 ea   Therastick shakes AP, M/L Red: 30 sec x 1 ea (Straight arm ea) Red: 40 sec x 1 ea (straight arm ea) Red: 40 sec x 1 ea (straight arm ea) Red: 40 sec x 1 ea (straight arm ea)  Red: 40 sec x 1 ea (straight arm ea)  Red: sec x 1 ea (straight arm ea) Red: 30 sec x 1 ea (Straight arm ea)   Supination and pronation 4# 30 ea 4# 30 ea 4# 30 ea 4# 30 ea  4# 30 ea  4# 30 ea 4# 30 ea   DB RD ecc 3# 30 ea 3# 30 ea 3# 30 ea 3# 30 ea 3# 30 ea  3# 30 ea 3# 30 ea   Supination and pronation with Hammer+weight NV? NV? 1.5# ankle weihr: 30 ea NV? 1.5# ankle weights: 30 ea  1.5# ankle weight: 30 ea 1.5# ankle weight: 30 ea   Ther Activity            Dunaway carry with slight elevation of shoulders and scap retraction NV? 8# Dbs: 3 laps ea 8# Dbs: 3 laps ea NV? 9# 3 laps ea  8# Dbs 3 laps ea 8# Dbs: 3 laps ea   DB hold with towel  NV? NV? NV? NV? NV?  6# 1 min x 2 6# 2 min x 1   DB carrying with head of weight                        Gait Training                                   Modalities            MH

## 2024-09-05 ENCOUNTER — OFFICE VISIT (OUTPATIENT)
Dept: PHYSICAL THERAPY | Facility: CLINIC | Age: 59
End: 2024-09-05
Payer: COMMERCIAL

## 2024-09-05 DIAGNOSIS — M25.521 RIGHT ELBOW PAIN: Primary | ICD-10-CM

## 2024-09-05 PROCEDURE — 97110 THERAPEUTIC EXERCISES: CPT | Performed by: PHYSICAL THERAPIST

## 2024-09-05 PROCEDURE — 97140 MANUAL THERAPY 1/> REGIONS: CPT | Performed by: PHYSICAL THERAPIST

## 2024-09-05 PROCEDURE — 97112 NEUROMUSCULAR REEDUCATION: CPT | Performed by: PHYSICAL THERAPIST

## 2024-09-05 NOTE — PROGRESS NOTES
Daily Note     Today's date: 2024  Patient name: Theresa Kim  : 1965  MRN: [id number]  Referring provider: Joselin Brumfield DO  Dx:   Encounter Diagnosis     ICD-10-CM    1. Right elbow pain  M25.521                      Subjective: She reports that she was lifting wood this weekend.       Objective: See treatment diary below      Assessment: Tolerated treatment well; initiated POC with UBE for active warmup. Vcs provided on proper sequencing with exercises; overall no reports of pain during program. MT performed after receiving consent from pt; she reports increased tenderness with palpation of medial aspect of extensor mass. Patient demonstrated fatigue post treatment and would benefit from continued PT      Plan: Continue per plan of care.      Precautions: No past medical history on file. HTN        Access Code: [email or web address]  URL: [email or web address]  Date: 2024  Prepared by: Nancy Colby    Exercises  - Median Nerve Flossing - Tray  - 1 x daily - 7 x weekly - 2 sets - 10 reps  - Ulnar Nerve Flossing  - 1 x daily - 7 x weekly - 2 sets - 10 reps  - Seated Cervical Retraction and Extension  - 1 x daily - 7 x weekly - 3 sets - 10 reps  - Neck Retraction  - 1 x daily - 7 x weekly - 3 sets - 10 reps          Date 2/26 3/1 3/4 3/7 3/12 3/15         Visit # 1 2 3 4 5 6         FOTO done              Re-eval                     Manuals 8/15 8/19 8/22 8/26 8/29 9/5  8/8   PROM           Tspine           Elbow mobilization           Taping Tricep                       Tricep IASTM           IASTM along extensor mass SMF ext and flex mass SMF ext and flex mass SMF ext ad flex mass SMF ext and flex mass SMF ext mass SMF ext mass  SMF ext and flex mass   Active release of Extensor mass           Cupping SMF SMF along ext mass only SMF along ext mass only  SMF along ext mass only SMF along ext mass only SMF along ext mass only  SMF along extension mass only -    PA glides cervical             Cervical jt mobs                       Neuro Re-Ed           Rhythmic stab           Prone Ys, Ts, Is           Scap squeezes           Prone ext           Median nerve glides With side bend away: 2x10 With side bend away: 2x10 With side bend away: 2x10 NV? With side bend away: 2x10 With side bend away: 2x10  With side bend away: 2x10   Ulnar nerve glides           Radial N glides with head turn to the left With side bend away: 2x10 With side bend away: 2x10 With side bend away: 2x10 NV? With side bend away: 2x10 With side bend away: 2x10  With side bend away: 2x10   TB B Scap Retraction  NV BTB 2 sec; 2x15 NV? NV? NV? NV?  BTB 2 sec; 2x15   TB B ' S Ext NV BTB 2 sec; 2x15 NV? NV? NV? NV?  BTB 2 sec; 2x15   Seated thoracic ext           Standing Thoracic Extension with hands behind head with elbows sliding up wall           Bicep stretch using [employer]           Pec stretch in doorway           Elbow Flexion stretch c small towel           Seated thoracic extension stretch  (soccer ball)  2x10 (hands clasped and second round with hands behind head)  2x10 (hands clasped and second roung with hands behind head) 2x10 (hands clasped and second round with hands behind head) 2x10 (hands clasped and second round with hands behind head) 2x10 (hands clasped and second round with hands behind head)  2x10 (hands clasped and second round with hands behind head)  2x10 (hands clasped and second round with hands behind head)    Sideying Thoracic Rotation stretch           Cerivcal retraction           Cervical retraction to exntesion           Ther Ex           UBE 2 mins fwd/retro 2 mins fwd/retro 2 mins fwd/ retro 2 mins fwd/retro 2 mins fwd/retro 2 mins fwd/retro  2 mins fwd/retro   Wrist ext/flex DB 4# 30 ea (rolling weights on finger tips during flex) 4# 30 ea (rolling weights on finger tips during flex) 4# 30 ea (rolling weights on finger tips during flex) 4# 30 ea (rolling weights on finger tips during flex) 4# 30 ea  (rolling on finger tips during flex)  4# 30 ea (rolling on finger tips during flex)  4# 30 ea (rolling weights on finger tips during flex)   Diggiflex (all, individual) Red (all): 30; Yellow: individual 30 Red (all): 30; Yellow: individual: 30 NV? NV? Red (all): 30; Yellow: (individual): 30 Red (all): 30; Yellow (individual): 30  Red (all): 30; Yellow: Individual: 15   Metal gripper 50# 30 50# 30 50# 30 NV? 50# 30 50# 30  50# 30   Pronation with supination during elbow flexion        6# 30 ea                         Tricep ext           Rows, Ext            Wrist flex, ext stretch            strengthening            No moneys  GTB 2 sec x 20 NV? NV? NV? GTB 2 sec x 20  GTB 2 sec x 20  NV?              Supination stretch           Tricep stretch           Therastick wrist flexion and extension eccentric Red: 5 sec x 30 ea Red: 5 sec x 30 ea Red: 5 sec x 30 ea Red: 5 sec x 30 ea Red: 5 sec x 30 ea Red: 5 sec x 30 ea  Red: 5 sec x 30 ea   Therastick shakes AP, M/L Red: 30 sec x 1 ea (Straight arm ea) Red: 40 sec x 1 ea (straight arm ea) Red: 40 sec x 1 ea (straight arm ea) Red: 40 sec x 1 ea (straight arm ea)  Red: 40 sec x 1 ea (straight arm ea) Red: 40 sec x 1 ea (Straight arm ea)  Red: 30 sec x 1 ea (Straight arm ea)   Supination and pronation 4# 30 ea 4# 30 ea 4# 30 ea 4# 30 ea  4# 30 ea 4# 30 ea  4# 30 ea   DB RD ecc 3# 30 ea 3# 30 ea 3# 30 ea 3# 30 ea 3# 30 ea 3# 30 ea  3# 30 ea   Supination and pronation with Hammer+weight NV? NV? 1.5# ankle weihr: 30 ea NV? 1.5# ankle weights: 30 ea 1.5# ankle weights: 30 ea  1.5# ankle weight: 30 ea   Ther Activity            Dunaway carry with slight elevation of shoulders and scap retraction NV? 8# Dbs: 3 laps ea 8# Dbs: 3 laps ea NV? 9# 3 laps ea 9# 3 laps ea  8# Dbs: 3 laps ea   DB hold with towel  NV? NV? NV? NV? NV?   6# 2 min x 1   DB carrying with head of weight                       Gait Training                                   Modalities            MH

## 2024-09-12 ENCOUNTER — OFFICE VISIT (OUTPATIENT)
Dept: PHYSICAL THERAPY | Facility: CLINIC | Age: 59
End: 2024-09-12
Payer: COMMERCIAL

## 2024-09-12 DIAGNOSIS — M25.521 RIGHT ELBOW PAIN: Primary | ICD-10-CM

## 2024-09-12 PROCEDURE — 97140 MANUAL THERAPY 1/> REGIONS: CPT | Performed by: PHYSICAL THERAPIST

## 2024-09-12 PROCEDURE — 97110 THERAPEUTIC EXERCISES: CPT | Performed by: PHYSICAL THERAPIST

## 2024-09-12 PROCEDURE — 97112 NEUROMUSCULAR REEDUCATION: CPT | Performed by: PHYSICAL THERAPIST

## 2024-09-12 NOTE — PROGRESS NOTES
PT Evaluation     Today's date: 2024  Patient name: Theresa Kim  : 1965  MRN: [id number]  Referring provider: Joselin Brumfield DO  Dx:   Encounter Diagnosis     ICD-10-CM    1. Right elbow pain  M25.521               Assessment  Assessment details: Theresa is a pleasant 59 yo female presenting to OP PT secondary to Right elbow pain with insidious onset 1 year ago. She denies DALLIN, and reports no numbness/tingling. She reports having improvements with her strength in being able to hold a watering can approximately 2 gallons when she was not able to do prior. She does report that she experienced pain while riding her Ebike.  Overall, she reports having at least a 75% improvement since I.E. Pt presents continued but improving wrist and elbow strength deficits, periscap strength deficits, and pain at end range elbow/flex ext. Pain is increased along triceps with palmation and increase posterior medial elbow pain with valgus stress test. Pt would benefit from skilled PT to address stated deficits and improve tolerance to daily activiites and improve maximal function.  Impairments: impaired physical strength and pain with function    Symptom irritability: moderateUnderstanding of Dx/Px/POC: good   Prognosis: good    Goals  Pt will demonstrate Gage with progressive home exercise program to assist with PT carry over and prevent recurrence of symptoms in the future - Goal met  Pt will demonstrate 20% improvement in FOTO score to increase tolerance to functional return. - Progressing   Pt will demonstrate 20 deg improvement in shoulder ROM to increase tolerance to reaching overhead, behind neck, and behind back to increase ease with ADLs such dressing/bathing - Progressing   Pt will demonstrate 4+/5 in UE strength to allow for improved tolerance to lifting items overhead. - Progressing   Pt will demonstrate 4+/5 in periscap strength to improve posture in sitting. - Progressing       Plan  Patient  would benefit from: PT eval and skilled physical therapy  Planned modality interventions: TENS, manual electrical stimulation, low level laser therapy, thermotherapy: hydrocollator packs and cryotherapy  Planned therapy interventions: IASTM, joint mobilization, manual therapy, massage, nerve gliding, patient education, neuromuscular re-education, stretching, strengthening, therapeutic activities and therapeutic exercise  Frequency: 2x week  Plan of Care beginning date: 2024  Plan of Care expiration date: 2024  Treatment plan discussed with: patient        Subjective Evaluation    History of Present Illness    RE: 2024: Patient reports overall sxs have improved however recent pain noted with riding her Ebike.     RE: 24: Patient reports that she continues to have soreness in her arm and tightness especially when she overworks her arm such as being in the garden. Overall she does report that she has improved in her strength.       RE 24: Patient was away on vacation for two weeks and reports that her sxs did improve. She did keep up with her nerve glide exercises. The pain has returned since returning to work and using the mouse. She has been at a constant 7/10 pain level this week.     Mechanism of injury: Theresa is a 59 yo female presenting to OP PT secondary to Right elbow pain with onset 1 year ago. Pt reports sx are aggravated with working on computer using mouse and with maintaining elbow in end range positions. She reports weakness with carrying items, working overhead, and completing yard work. She reports some relief with Advil, massage, and compression stretch.          Recurrent probem    Quality of life: good    Patient Goals  Patient goals for therapy: increased strength  Patient goal: Stronger and less pain;  Pain  Current pain ratin  At best pain ratin  At worst pain ratin  Location: Elbow  Quality: dull ache  Aggravating factors: lifting, sitting, standing and stair  climbing, opening jars    Social Support  Stairs in house: yes   Lives in: multiple-level home  Lives with: significant other    Employment status: working  Hand dominance: right      Diagnostic Tests  X-ray: normal  Treatments  No previous or current treatments        Objective     Observations     Right Elbow   Negative for edema, effusion and incisions.     Palpation     Right   Tenderness of the triceps and wrist flexors.     Active Range of Motion     Right Elbow   Normal active range of motion  Elbow hyperextension  Extension: with pain    CROM:  Flexion: TBA  Extension: TBA  Rotation: (R) TBA (L) TBA  Lat: (R) TBA (L) TBA    Passive Range of Motion     Right Elbow   Normal passive range of motion  Extension: with pain    Joint Play     Right Elbow   Joints within functional limits are the humeroulnar joint, humeroradial joint and proximal radioulnar joint.     Strength/Myotome Testing     Right Elbow   Flexion: 4  Extension: 4  Forearm supination: 4  Forearm pronation: 4    Right Wrist/Hand   Wrist extension: 4  Wrist flexion: 4-  Radial deviation: 3+  Ulnar deviation: 4-    UT: (R) 3+/5 (L) 4-/5  MT: (T) 4-/5 (L) 4-/5  LT: (R) 3/5 (L) 3/5        Tests     Right Elbow   Positive active medial epicondylitis, moving valgus stress, passive medial epicondylitis and valgus stress at 30 degrees.   Negative Cozen's, elbow flexion, handshake, milking maneuver, pronator compression, radial tunnel and valgus stress at 0 degrees.       Diagnosis:  Right elbow pain   Precautions:  HTN   Comparable signs 1) End range elbow extension   2) pain with wrist flexion  3)    Primary Impairments: 1)  Right wrist and elbow weakness  2) Right periscap strength deficits   Patient Goals Work at her computer without discomfort               Precautions: No past medical history on file. HTN    Exercises  - Median Nerve Flossing - Tray  - 1 x daily - 7 x weekly - 2 sets - 10 reps  - Ulnar Nerve Flossing  - 1 x daily - 7 x weekly - 2  sets - 10 reps  - Seated Cervical Retraction and Extension  - 1 x daily - 7 x weekly - 3 sets - 10 reps  - Neck Retraction  - 1 x daily - 7 x weekly - 3 sets - 10 reps          Date 2/26 3/1 3/4 3/7 3/12 3/15         Visit # 1 2 3 4 5 6         FOTO done              Re-eval                     Manuals 8/15 8/19 8/22 8/26 8/29 9/5 9/12    PROM           Tspine           Elbow mobilization           Taping Tricep                       Tricep IASTM           IASTM along extensor mass SMF ext and flex mass SMF ext and flex mass SMF ext ad flex mass SMF ext and flex mass SMF ext mass SMF ext mass SMF ext mass    Active release of Extensor mass           Cupping SMF SMF along ext mass only SMF along ext mass only  SMF along ext mass only SMF along ext mass only SMF along ext mass only SMF along ext mass only    PA glides cervical            Cervical jt mobs                       Neuro Re-Ed           Rhythmic stab           Prone Ys, Ts, Is           Scap squeezes           Prone ext           Median nerve glides With side bend away: 2x10 With side bend away: 2x10 With side bend away: 2x10 NV? With side bend away: 2x10 With side bend away: 2x10 With side bend away: 2x10    Ulnar nerve glides           Radial N glides with head turn to the left With side bend away: 2x10 With side bend away: 2x10 With side bend away: 2x10 NV? With side bend away: 2x10 With side bend away: 2x10 With side bend away: 2x10    TB B Scap Retraction  NV BTB 2 sec; 2x15 NV? NV? NV? NV?     TB B ' S Ext NV BTB 2 sec; 2x15 NV? NV? NV? NV?     Seated thoracic ext           Standing Thoracic Extension with hands behind head with elbows sliding up wall           Bicep stretch using [employer]           Pec stretch in doorway           Elbow Flexion stretch c small towel           Seated thoracic extension stretch  (soccer ball)  2x10 (hands clasped and second round with hands behind head)  2x10 (hands clasped and second roung with hands behind head) 2x10  (hands clasped and second round with hands behind head) 2x10 (hands clasped and second round with hands behind head) 2x10 (hands clasped and second round with hands behind head)  2x10 (hands clasped and second round with hands behind head)     Sideying Thoracic Rotation stretch           Cerivcal retraction           Cervical retraction to exntesion           Ther Ex           UBE 2 mins fwd/retro 2 mins fwd/retro 2 mins fwd/ retro 2 mins fwd/retro 2 mins fwd/retro 2 mins fwd/retro 2 mins fwd/retro    Wrist ext/flex DB 4# 30 ea (rolling weights on finger tips during flex) 4# 30 ea (rolling weights on finger tips during flex) 4# 30 ea (rolling weights on finger tips during flex) 4# 30 ea (rolling weights on finger tips during flex) 4# 30 ea (rolling on finger tips during flex)  4# 30 ea (rolling on finger tips during flex) 4# 30 ea (rolling on finger tips during flex)     Diggiflex (all, individual) Red (all): 30; Yellow: individual 30 Red (all): 30; Yellow: individual: 30 NV? NV? Red (all): 30; Yellow: (individual): 30 Red (all): 30; Yellow (individual): 30 Red (all): 30; yellow (individual): 30    Metal gripper 50# 30 50# 30 50# 30 NV? 50# 30 50# 30 50# 30    Pronation with supination during elbow flexion                                 Tricep ext           Rows, Ext            Wrist flex, ext stretch            strengthening            No moneys  GTB 2 sec x 20 NV? NV? NV? GTB 2 sec x 20  GTB 2 sec x 20                Supination stretch           Tricep stretch           Therastick wrist flexion and extension eccentric Red: 5 sec x 30 ea Red: 5 sec x 30 ea Red: 5 sec x 30 ea Red: 5 sec x 30 ea Red: 5 sec x 30 ea Red: 5 sec x 30 ea Green: 5 sec x 30 ea    Therastick shakes AP, M/L Red: 30 sec x 1 ea (Straight arm ea) Red: 40 sec x 1 ea (straight arm ea) Red: 40 sec x 1 ea (straight arm ea) Red: 40 sec x 1 ea (straight arm ea)  Red: 40 sec x 1 ea (straight arm ea) Red: 40 sec x 1 ea (Straight arm ea) Green: 40  sec x 1 ea (straight arm ea)                Supination and pronation 4# 30 ea 4# 30 ea 4# 30 ea 4# 30 ea  4# 30 ea 4# 30 ea 4# 30 ea    DB RD ecc 3# 30 ea 3# 30 ea 3# 30 ea 3# 30 ea 3# 30 ea 3# 30 ea 3# 30 ea    Supination and pronation with Hammer+weight NV? NV? 1.5# ankle weihr: 30 ea NV? 1.5# ankle weights: 30 ea 1.5# ankle weights: 30 ea 1.5# ankle weights: 30 ea    Ther Activity            Dunaway carry with slight elevation of shoulders and scap retraction NV? 8# Dbs: 3 laps ea 8# Dbs: 3 laps ea NV? 9# 3 laps ea 9# 3 laps ea 9# 3 laps ea    DB hold with towel  NV? NV? NV? NV? NV?      DB carrying with head of weight                       Gait Training                                   Modalities            MH

## 2024-09-16 ENCOUNTER — OFFICE VISIT (OUTPATIENT)
Dept: PHYSICAL THERAPY | Facility: CLINIC | Age: 59
End: 2024-09-16
Payer: COMMERCIAL

## 2024-09-16 DIAGNOSIS — M25.521 RIGHT ELBOW PAIN: Primary | ICD-10-CM

## 2024-09-16 PROCEDURE — 97140 MANUAL THERAPY 1/> REGIONS: CPT | Performed by: PHYSICAL THERAPIST

## 2024-09-16 PROCEDURE — 97112 NEUROMUSCULAR REEDUCATION: CPT | Performed by: PHYSICAL THERAPIST

## 2024-09-16 PROCEDURE — 97110 THERAPEUTIC EXERCISES: CPT | Performed by: PHYSICAL THERAPIST

## 2024-09-16 NOTE — PROGRESS NOTES
Daily Note     Today's date: 2024  Patient name: Theresa Kim  : 1965  MRN: [id number]  Referring provider: Joselin Brumfield DO  Dx:   Encounter Diagnosis     ICD-10-CM    1. Right elbow pain  M25.521                      Subjective: She reports that she was sore after last visit.       Objective: See treatment diary below      Assessment: Tolerated treatment well; initiated POC with UBE for active warmup. Vcs provided on proper sequencing with exercises; she continues to fatigue with NM control therastick shaking exercise for muscle control as well as endurance training. She denies having increased pain however soreness. She continues to have palpable tenderness with medial aspect of extensor mass.   Patient demonstrated fatigue post treatment and would benefit from continued PT      Plan: Continue per plan of care.      Precautions: No past medical history on file. HTN        Access Code: [email or web address]  URL: [email or web address]  Date: 2024  Prepared by: Nancy Colby    Exercises  - Median Nerve Flossing - Tray  - 1 x daily - 7 x weekly - 2 sets - 10 reps  - Ulnar Nerve Flossing  - 1 x daily - 7 x weekly - 2 sets - 10 reps  - Seated Cervical Retraction and Extension  - 1 x daily - 7 x weekly - 3 sets - 10 reps  - Neck Retraction  - 1 x daily - 7 x weekly - 3 sets - 10 reps            Date 2/26 3/1 3/4 3/7 3/12 3/15         Visit # 1 2 3 4 5 6         FOTO done              Re-eval                     Manuals 8/15 8/19 8/22 8/26 8/29 9/5 9/12 9/16   PROM           Tspine           Elbow mobilization           Taping Tricep                       Tricep IASTM           IASTM along extensor mass SMF ext and flex mass SMF ext and flex mass SMF ext ad flex mass SMF ext and flex mass SMF ext mass SMF ext mass SMF ext mass SMF ext mass   Active release of Extensor mass           Cupping SMF SMF along ext mass only SMF along ext mass only  SMF along ext mass only SMF along ext mass  only SMF along ext mass only SMF along ext mass only SMF along ext mass only   PA glides cervical            Cervical jt mobs                       Neuro Re-Ed           Rhythmic stab           Prone Ys, Ts, Is           Scap squeezes           Prone ext           Median nerve glides With side bend away: 2x10 With side bend away: 2x10 With side bend away: 2x10 NV? With side bend away: 2x10 With side bend away: 2x10 With side bend away: 2x10 With side bend away: 2x10   Ulnar nerve glides           Radial N glides with head turn to the left With side bend away: 2x10 With side bend away: 2x10 With side bend away: 2x10 NV? With side bend away: 2x10 With side bend away: 2x10 With side bend away: 2x10 With side bend away: 2x10   TB B Scap Retraction  NV BTB 2 sec; 2x15 NV? NV? NV? NV?  NV?   TB B ' S Ext NV BTB 2 sec; 2x15 NV? NV? NV? NV?  NV?   Seated thoracic ext           Standing Thoracic Extension with hands behind head with elbows sliding up wall           Bicep stretch using Contorion           Pec stretch in doorway           Elbow Flexion stretch c small towel           Seated thoracic extension stretch  (soccer ball)  2x10 (hands clasped and second round with hands behind head)  2x10 (hands clasped and second roung with hands behind head) 2x10 (hands clasped and second round with hands behind head) 2x10 (hands clasped and second round with hands behind head) 2x10 (hands clasped and second round with hands behind head)  2x10 (hands clasped and second round with hands behind head)  NV?   Sideying Thoracic Rotation stretch           Cerivcal retraction           Cervical retraction to exntesion           Ther Ex           UBE 2 mins fwd/retro 2 mins fwd/retro 2 mins fwd/ retro 2 mins fwd/retro 2 mins fwd/retro 2 mins fwd/retro 2 mins fwd/retro 2 mins fwd/retro   Wrist ext/flex DB 4# 30 ea (rolling weights on finger tips during flex) 4# 30 ea (rolling weights on finger tips during flex) 4# 30 ea (rolling weights on  finger tips during flex) 4# 30 ea (rolling weights on finger tips during flex) 4# 30 ea (rolling on finger tips during flex)  4# 30 ea (rolling on finger tips during flex) 4# 30 ea (rolling on finger tips during flex)  4# 30 ea (rolling on finger tips during flex)   Diggiflex (all, individual) Red (all): 30; Yellow: individual 30 Red (all): 30; Yellow: individual: 30 NV? NV? Red (all): 30; Yellow: (individual): 30 Red (all): 30; Yellow (individual): 30 Red (all): 30; yellow (individual): 30 Red (all): 30; yellow (individual): 30   Metal gripper 50# 30 50# 30 50# 30 NV? 50# 30 50# 30 50# 30 50# 30   Pronation with supination during elbow flexion                                 Tricep ext           Rows, Ext            Wrist flex, ext stretch            strengthening            No moneys  GTB 2 sec x 20 NV? NV? NV? GTB 2 sec x 20  GTB 2 sec x 20                Supination stretch           Tricep stretch           Therastick wrist flexion and extension eccentric Red: 5 sec x 30 ea Red: 5 sec x 30 ea Red: 5 sec x 30 ea Red: 5 sec x 30 ea Red: 5 sec x 30 ea Red: 5 sec x 30 ea Green: 5 sec x 30 ea Green: 5 sec x 30 ea   Therastick shakes AP, M/L Red: 30 sec x 1 ea (Straight arm ea) Red: 40 sec x 1 ea (straight arm ea) Red: 40 sec x 1 ea (straight arm ea) Red: 40 sec x 1 ea (straight arm ea)  Red: 40 sec x 1 ea (straight arm ea) Red: 40 sec x 1 ea (Straight arm ea) Green: 40 sec x 1 ea (straight arm ea)  Green: 40 sec x 1 ea (straight arm ea)              Supination and pronation 4# 30 ea 4# 30 ea 4# 30 ea 4# 30 ea  4# 30 ea 4# 30 ea 4# 30 ea 4# 30 ea    DB RD ecc 3# 30 ea 3# 30 ea 3# 30 ea 3# 30 ea 3# 30 ea 3# 30 ea 3# 30 ea 3# 30 ea   Supination and pronation with Hammer+weight NV? NV? 1.5# ankle weihr: 30 ea NV? 1.5# ankle weights: 30 ea 1.5# ankle weights: 30 ea 1.5# ankle weights: 30 ea 1.5# ankle weights: 30 ea   Ther Activity            Dunaway carry with slight elevation of shoulders and scap retraction NV? 8#  Dbs: 3 laps ea 8# Dbs: 3 laps ea NV? 9# 3 laps ea 9# 3 laps ea 9# 3 laps ea 9# 3 laps ea   DB hold with towel  NV? NV? NV? NV? NV?      DB carrying with head of weight                       Gait Training                                   Modalities            MH

## 2024-09-19 ENCOUNTER — APPOINTMENT (OUTPATIENT)
Dept: PHYSICAL THERAPY | Facility: CLINIC | Age: 59
End: 2024-09-19
Payer: COMMERCIAL

## 2024-09-23 ENCOUNTER — OFFICE VISIT (OUTPATIENT)
Dept: PHYSICAL THERAPY | Facility: CLINIC | Age: 59
End: 2024-09-23
Payer: COMMERCIAL

## 2024-09-23 DIAGNOSIS — M25.521 RIGHT ELBOW PAIN: Primary | ICD-10-CM

## 2024-09-23 PROCEDURE — 97140 MANUAL THERAPY 1/> REGIONS: CPT | Performed by: PHYSICAL THERAPIST

## 2024-09-23 PROCEDURE — 97112 NEUROMUSCULAR REEDUCATION: CPT | Performed by: PHYSICAL THERAPIST

## 2024-09-23 PROCEDURE — 97110 THERAPEUTIC EXERCISES: CPT | Performed by: PHYSICAL THERAPIST

## 2024-09-23 NOTE — PROGRESS NOTES
Daily Note     Today's date: 2024  Patient name: Theresa Kim  : 1965  MRN: [id number]  Referring provider: Joselin Brumfield DO  Dx:   Encounter Diagnosis     ICD-10-CM    1. Right elbow pain  M25.521                      Subjective: Patient reports that while she was away they road her E bikes for approximately 20 miles the one day so she is pretty stiff.       Objective: See treatment diary below      Assessment: Tolerated treatment well; initiated POC with Scifit for active warmup. Vcs provided on proper sequencing with exercises; increased weight with pronation and supination as well as farmer carry. MT performed after receiving consent from pt; increased tone along extensor mass more medial aspect.    Patient demonstrated fatigue post treatment and would benefit from continued PT      Plan: Continue per plan of care.      Precautions: No past medical history on file. HTN        Access Code: [email or web address]  URL: [email or web address]  Date: 2024  Prepared by: Nancy Colby    Exercises  - Median Nerve Flossing - Tray  - 1 x daily - 7 x weekly - 2 sets - 10 reps  - Ulnar Nerve Flossing  - 1 x daily - 7 x weekly - 2 sets - 10 reps  - Seated Cervical Retraction and Extension  - 1 x daily - 7 x weekly - 3 sets - 10 reps  - Neck Retraction  - 1 x daily - 7 x weekly - 3 sets - 10 reps            Date 2/26 3/1 3/4 3/7 3/12 3/15         Visit # 1 2 3 4 5 6         FOTO done              Re-eval                     Manuals    PROM           Tspine           Elbow mobilization           Taping Tricep                       Tricep IASTM           STM L extensor and flex stretch SMF          IASTM along extensor mass SMF ext mass  SMF ext ad flex mass SMF ext and flex mass SMF ext mass SMF ext mass SMF ext mass SMF ext mass   Active release of Extensor mass           L flexion and ext stretch SMF          Cupping NV?  SMF along ext mass only  SMF along  ext mass only SMF along ext mass only SMF along ext mass only SMF along ext mass only SMF along ext mass only   PA glides cervical            Cervical jt mobs                       Neuro Re-Ed           Rhythmic stab           Prone Ys, Ts, Is           Scap squeezes           Prone ext           Median nerve glides With side bend away: 2x10  With side bend away: 2x10 NV? With side bend away: 2x10 With side bend away: 2x10 With side bend away: 2x10 With side bend away: 2x10   Ulnar nerve glides           Radial N glides with head turn to the left With side bend away: 2x10  With side bend away: 2x10 NV? With side bend away: 2x10 With side bend away: 2x10 With side bend away: 2x10 With side bend away: 2x10   TB B Scap Retraction    NV? NV? NV? NV?  NV?   TB B ' S Ext   NV? NV? NV? NV?  NV?   Seated thoracic ext           Standing Thoracic Extension with hands behind head with elbows sliding up wall           Bicep stretch using Avi[employer]m           Pec stretch in doorway           Elbow Flexion stretch c small towel           Seated thoracic extension stretch  (soccer ball)  1x10 (hands clasped)  2x10 (hands clasped and second round with hands behind head) 2x10 (hands clasped and second round with hands behind head) 2x10 (hands clasped and second round with hands behind head)  2x10 (hands clasped and second round with hands behind head)  NV?   Sideying Thoracic Rotation stretch           Cerivcal retraction           Cervical retraction to exntesion           Ther Ex           UBE 2 mins fwd/retro  2 mins fwd/ retro 2 mins fwd/retro 2 mins fwd/retro 2 mins fwd/retro 2 mins fwd/retro 2 mins fwd/retro   Wrist ext/flex DB 4# 30 ea (rolling on finger tips during flex)  4# 30 ea (rolling weights on finger tips during flex) 4# 30 ea (rolling weights on finger tips during flex) 4# 30 ea (rolling on finger tips during flex)  4# 30 ea (rolling on finger tips during flex) 4# 30 ea (rolling on finger tips during flex)  4# 30 ea  (rolling on finger tips during flex)   Diggiflex (all, individual) Red (all): 30; yellow (individual): 30  NV? NV? Red (all): 30; Yellow: (individual): 30 Red (all): 30; Yellow (individual): 30 Red (all): 30; yellow (individual): 30 Red (all): 30; yellow (individual): 30   Metal gripper 50# 30  50# 30 NV? 50# 30 50# 30 50# 30 50# 30   Pronation with supination during elbow flexion                                 Tricep ext           Rows, Ext            Wrist flex, ext stretch            strengthening            No moneys  NV?  NV? NV? GTB 2 sec x 20  GTB 2 sec x 20                Supination stretch           Tricep stretch           Therastick wrist flexion and extension eccentric Green: 5 sec x 30 ea  Red: 5 sec x 30 ea Red: 5 sec x 30 ea Red: 5 sec x 30 ea Red: 5 sec x 30 ea Green: 5 sec x 30 ea Green: 5 sec x 30 ea   Therastick shakes AP, M/L Green: 40 sec x 1 ea (straight arm ea)   Red: 40 sec x 1 ea (straight arm ea) Red: 40 sec x 1 ea (straight arm ea)  Red: 40 sec x 1 ea (straight arm ea) Red: 40 sec x 1 ea (Straight arm ea) Green: 40 sec x 1 ea (straight arm ea)  Green: 40 sec x 1 ea (straight arm ea)              Supination and pronation 4# 30 ea  4# 30 ea 4# 30 ea  4# 30 ea 4# 30 ea 4# 30 ea 4# 30 ea    DB RD ecc 3# 30 ea  3# 30 ea 3# 30 ea 3# 30 ea 3# 30 ea 3# 30 ea 3# 30 ea   Supination and pronation with Hammer+weight 2# ankle weights: 10 ea  1.5# ankle weihr: 30 ea NV? 1.5# ankle weights: 30 ea 1.5# ankle weights: 30 ea 1.5# ankle weights: 30 ea 1.5# ankle weights: 30 ea   Ther Activity            [place]way carry with slight elevation of shoulders and scap retraction 10# 3 laps ea  8# Dbs: 3 laps ea NV? 9# 3 laps ea 9# 3 laps ea 9# 3 laps ea 9# 3 laps ea   DB hold with towel    NV? NV? NV?      DB carrying with head of weight                       Gait Training                                   Modalities            MH

## 2024-09-26 ENCOUNTER — OFFICE VISIT (OUTPATIENT)
Dept: PHYSICAL THERAPY | Facility: CLINIC | Age: 59
End: 2024-09-26
Payer: COMMERCIAL

## 2024-09-26 DIAGNOSIS — M25.521 RIGHT ELBOW PAIN: Primary | ICD-10-CM

## 2024-09-26 PROCEDURE — 97110 THERAPEUTIC EXERCISES: CPT | Performed by: PHYSICAL THERAPIST

## 2024-09-26 PROCEDURE — 97140 MANUAL THERAPY 1/> REGIONS: CPT | Performed by: PHYSICAL THERAPIST

## 2024-09-26 PROCEDURE — 97112 NEUROMUSCULAR REEDUCATION: CPT | Performed by: PHYSICAL THERAPIST

## 2024-09-26 NOTE — PROGRESS NOTES
Daily Note     Today's date: 2024  Patient name: Theresa Kim  : 1965  MRN: [id number]  Referring provider: Joselin Brumfield DO  Dx:   Encounter Diagnosis     ICD-10-CM    1. Right elbow pain  M25.521                      Subjective: patient reports that she was pretty sore after last visit.       Objective: See treatment diary below      Assessment: Tolerated treatment well; initiated POC with UBE for active warmup. VCS provided on proper sequencing with exercises; she continues to demonstrate improving muscle endurance with exercise routine. MT performed after receiving consent from pt; she continues to have increased tone along medial aspect of extensor mass which improves post TPR.  Patient demonstrated fatigue post treatment and would benefit from continued PT      Plan: Continue per plan of care.      Precautions: No past medical history on file. HTN        Access Code: [email or web address]  URL: [email or web address]  Date: 2024  Prepared by: Nancy Colby    Exercises  - Median Nerve Flossing - Tray  - 1 x daily - 7 x weekly - 2 sets - 10 reps  - Ulnar Nerve Flossing  - 1 x daily - 7 x weekly - 2 sets - 10 reps  - Seated Cervical Retraction and Extension  - 1 x daily - 7 x weekly - 3 sets - 10 reps  - Neck Retraction  - 1 x daily - 7 x weekly - 3 sets - 10 reps            Date 2/26 3/1 3/4 3/7 3/12 3/15         Visit # 1 2 3 4 5 6         FOTO done              Re-eval                     Manuals    PROM           Tspine           Elbow mobilization           Taping Tricep                       Tricep IASTM           STM L extensor and flex stretch SMF SMF         IASTM along extensor mass SMF ext mass SMF ext mass  SMF ext and flex mass SMF ext mass SMF ext mass SMF ext mass SMF ext mass   Active release of Extensor mass  NV?         L flexion and ext stretch SMF SMF (wrist extension stretch only)         Cupping NV?   SMF along ext mass only  SMF along ext mass only SMF along ext mass only SMF along ext mass only SMF along ext mass only   PA glides cervical            Cervical jt mobs                       Neuro Re-Ed           Rhythmic stab           Prone Ys, Ts, Is           Scap squeezes           Prone ext           Median nerve glides With side bend away: 2x10 With side bend away: 2x10  NV? With side bend away: 2x10 With side bend away: 2x10 With side bend away: 2x10 With side bend away: 2x10   Ulnar nerve glides           Radial N glides with head turn to the left With side bend away: 2x10 With side bend away: 2x10  NV? With side bend away: 2x10 With side bend away: 2x10 With side bend away: 2x10 With side bend away: 2x10   TB B Scap Retraction     NV? NV? NV?  NV?   TB B ' S Ext    NV? NV? NV?  NV?   Seated thoracic ext           Standing Thoracic Extension with hands behind head with elbows sliding up wall           Bicep stretch using Lift           Pec stretch in doorway           Elbow Flexion stretch c small towel           Seated thoracic extension stretch  (soccer ball)  1x10 (hands clasped) 1x10 (hands clasped)  2x10 (hands clasped and second round with hands behind head) 2x10 (hands clasped and second round with hands behind head)  2x10 (hands clasped and second round with hands behind head)  NV?   Sideying Thoracic Rotation stretch           Cerivcal retraction           Cervical retraction to exntesion           Ther Ex           UBE 2 mins fwd/retro 2 mins fwd/ retro  2 mins fwd/retro 2 mins fwd/retro 2 mins fwd/retro 2 mins fwd/retro 2 mins fwd/retro   Wrist ext/flex DB 4# 30 ea (rolling on finger tips during flex) 4# 30 ea (rolling on finger tips during flex)  4# 30 ea (rolling weights on finger tips during flex) 4# 30 ea (rolling on finger tips during flex)  4# 30 ea (rolling on finger tips during flex) 4# 30 ea (rolling on finger tips during flex)  4# 30 ea (rolling on finger tips during flex)   Diggiflex (all, individual) Red  (all): 30; yellow (individual): 30 Red (all): 30; Yellow (individual): 30  NV? Red (all): 30; Yellow: (individual): 30 Red (all): 30; Yellow (individual): 30 Red (all): 30; yellow (individual): 30 Red (all): 30; yellow (individual): 30   Metal gripper 50# 30 50# 30  NV? 50# 30 50# 30 50# 30 50# 30   Pronation with supination during elbow flexion  6# 30                               Tricep ext           Rows, Ext            Wrist flex, ext stretch            strengthening            No moneys  NV? NV?  NV? GTB 2 sec x 20  GTB 2 sec x 20                Supination stretch           Tricep stretch           Therastick wrist flexion and extension eccentric Green: 5 sec x 30 ea Green: 5 sec x 30 ea  Red: 5 sec x 30 ea Red: 5 sec x 30 ea Red: 5 sec x 30 ea Green: 5 sec x 30 ea Green: 5 sec x 30 ea   Therastick shakes AP, M/L Green: 40 sec x 1 ea (straight arm ea)  Green: 40 sec x 1 ea (straight arm ea)  Red: 40 sec x 1 ea (straight arm ea)  Red: 40 sec x 1 ea (straight arm ea) Red: 40 sec x 1 ea (Straight arm ea) Green: 40 sec x 1 ea (straight arm ea)  Green: 40 sec x 1 ea (straight arm ea)              Supination and pronation 4# 30 ea 4# 30 ea  4# 30 ea  4# 30 ea 4# 30 ea 4# 30 ea 4# 30 ea    DB RD ecc 3# 30 ea 3# 30 ea  3# 30 ea 3# 30 ea 3# 30 ea 3# 30 ea 3# 30 ea   Supination and pronation with Hammer+weight 2# ankle weights: 10 ea 2# ankle weights: 20 ea  NV? 1.5# ankle weights: 30 ea 1.5# ankle weights: 30 ea 1.5# ankle weights: 30 ea 1.5# ankle weights: 30 ea   Ther Activity            [place] carry with slight elevation of shoulders and scap retraction 10# 3 laps ea 10# 3 laps ea  NV? 9# 3 laps ea 9# 3 laps ea 9# 3 laps ea 9# 3 laps ea   DB hold with towel     NV? NV?      DB carrying with head of weight                       Gait Training                                   Modalities            MH

## 2024-09-30 ENCOUNTER — APPOINTMENT (OUTPATIENT)
Dept: PHYSICAL THERAPY | Facility: CLINIC | Age: 59
End: 2024-09-30
Payer: COMMERCIAL

## 2024-10-03 ENCOUNTER — APPOINTMENT (OUTPATIENT)
Dept: PHYSICAL THERAPY | Facility: CLINIC | Age: 59
End: 2024-10-03
Payer: COMMERCIAL

## 2024-10-07 ENCOUNTER — APPOINTMENT (OUTPATIENT)
Dept: PHYSICAL THERAPY | Facility: CLINIC | Age: 59
End: 2024-10-07
Payer: COMMERCIAL

## 2024-10-10 ENCOUNTER — OFFICE VISIT (OUTPATIENT)
Dept: PHYSICAL THERAPY | Facility: CLINIC | Age: 59
End: 2024-10-10
Payer: COMMERCIAL

## 2024-10-10 DIAGNOSIS — M25.521 RIGHT ELBOW PAIN: Primary | ICD-10-CM

## 2024-10-10 PROCEDURE — 97140 MANUAL THERAPY 1/> REGIONS: CPT | Performed by: PHYSICAL THERAPIST

## 2024-10-10 PROCEDURE — 97110 THERAPEUTIC EXERCISES: CPT | Performed by: PHYSICAL THERAPIST

## 2024-10-10 PROCEDURE — 97112 NEUROMUSCULAR REEDUCATION: CPT | Performed by: PHYSICAL THERAPIST

## 2024-10-10 NOTE — PROGRESS NOTES
Daily Note     Today's date: 10/10/2024  Patient name: Theresa Kim  : 1965  MRN: [id number]  Referring provider: Joselin Brumfield DO  Dx:   Encounter Diagnosis     ICD-10-CM    1. Right elbow pain  M25.521                      Subjective: She reports that she is really stiff from not having therapy.       Objective: See treatment diary below      Assessment: Tolerated treatment well; initiated POC with Ube for active warm-up. Vcs provided on proper responses to exercises. Session focused on STM/TPR due to increased tightness today. She has a positive response with MT post session with decreased sxs. Patient demonstrated fatigue post treatment and would benefit from continued PT      Plan: Continue per plan of care.      Precautions: No past medical history on file. HTN        Access Code: [email or web address]  URL: [email or web address]  Date: 2024  Prepared by: Nancy Colby    Exercises  - Median Nerve Flossing - Tray  - 1 x daily - 7 x weekly - 2 sets - 10 reps  - Ulnar Nerve Flossing  - 1 x daily - 7 x weekly - 2 sets - 10 reps  - Seated Cervical Retraction and Extension  - 1 x daily - 7 x weekly - 3 sets - 10 reps  - Neck Retraction  - 1 x daily - 7 x weekly - 3 sets - 10 reps            Date 2/26 3/1 3/4 3/7 3/12 3/15         Visit # 1 2 3 4 5 6         FOTO done              Re-eval                     Manuals 9/23 9/26 10/10   9/5 9/12 9/16   PROM           Tspine           Elbow mobilization           Taping Tricep                       Tricep IASTM           STM L extensor and flex stretch SMF SMF SMF        IASTM along extensor mass SMF ext mass SMF ext mass SMF ext mass   SMF ext mass SMF ext mass SMF ext mass   Active release of Extensor mass  NV?         L flexion and ext stretch SMF SMF (wrist extension stretch only) SMF (wrist flex and ext stretch)        Cupping NV?     SMF along ext mass only SMF along ext mass only SMF along ext mass only   PA glides cervical             Cervical jt mobs                       Neuro Re-Ed           Rhythmic stab           Prone Ys, Ts, Is           Scap squeezes           Prone ext           Median nerve glides With side bend away: 2x10 With side bend away: 2x10 NV   With side bend away: 2x10 With side bend away: 2x10 With side bend away: 2x10   Ulnar nerve glides           Radial N glides with head turn to the left With side bend away: 2x10 With side bend away: 2x10 NV   With side bend away: 2x10 With side bend away: 2x10 With side bend away: 2x10   TB B Scap Retraction       NV?  NV?   TB B ' S Ext      NV?  NV?   Seated thoracic ext           Standing Thoracic Extension with hands behind head with elbows sliding up wall           Bicep stretch using [employer]           Pec stretch in doorway           Elbow Flexion stretch c small towel           Seated thoracic extension stretch  (soccer ball)  1x10 (hands clasped) 1x10 (hands clasped) NV?   2x10 (hands clasped and second round with hands behind head)  NV?   Sideying Thoracic Rotation stretch           Cerivcal retraction           Cervical retraction to exntesion           Ther Ex           UBE 2 mins fwd/retro 2 mins fwd/ retro 2 mins fwd/retro   2 mins fwd/retro 2 mins fwd/retro 2 mins fwd/retro   Wrist ext/flex DB 4# 30 ea (rolling on finger tips during flex) 4# 30 ea (rolling on finger tips during flex) 4# 30 ea (rolling on finger tips during flex)   4# 30 ea (rolling on finger tips during flex) 4# 30 ea (rolling on finger tips during flex)  4# 30 ea (rolling on finger tips during flex)   Diggiflex (all, individual) Red (all): 30; yellow (individual): 30 Red (all): 30; Yellow (individual): 30 Red (all): 30; Yellow (individual): 30   Red (all): 30; Yellow (individual): 30 Red (all): 30; yellow (individual): 30 Red (all): 30; yellow (individual): 30   Metal gripper 50# 30 50# 30    50# 30 50# 30 50# 30   Pronation with supination during elbow flexion  6# 30 NV                               Tricep ext           Rows, Ext            Wrist flex, ext stretch            strengthening            No moneys  NV? NV? NV?   GTB 2 sec x 20                Supination stretch           Tricep stretch           Therastick wrist flexion and extension eccentric Green: 5 sec x 30 ea Green: 5 sec x 30 ea Green: 5 sec x 30 ea   Red: 5 sec x 30 ea Green: 5 sec x 30 ea Green: 5 sec x 30 ea   Therastick shakes AP, M/L Green: 40 sec x 1 ea (straight arm ea)  Green: 40 sec x 1 ea (straight arm ea) Green: 40 sec x 1 ea (straight arm ea)   Red: 40 sec x 1 ea (Straight arm ea) Green: 40 sec x 1 ea (straight arm ea)  Green: 40 sec x 1 ea (straight arm ea)              Supination and pronation 4# 30 ea 4# 30 ea 4# 30 ea    4# 30 ea 4# 30 ea 4# 30 ea    DB RD ecc 3# 30 ea 3# 30 ea 3# 30 ea   3# 30 ea 3# 30 ea 3# 30 ea   Supination and pronation with Hammer+weight 2# ankle weights: 10 ea 2# ankle weights: 20 ea NV?   1.5# ankle weights: 30 ea 1.5# ankle weights: 30 ea 1.5# ankle weights: 30 ea   Ther Activity            Dunaway carry with slight elevation of shoulders and scap retraction 10# 3 laps ea 10# 3 laps ea NV?   9# 3 laps ea 9# 3 laps ea 9# 3 laps ea   DB hold with towel            DB carrying with head of weight                       Gait Training                                   Modalities

## 2024-10-14 ENCOUNTER — APPOINTMENT (OUTPATIENT)
Dept: PHYSICAL THERAPY | Facility: CLINIC | Age: 59
End: 2024-10-14
Payer: COMMERCIAL

## 2024-10-17 ENCOUNTER — APPOINTMENT (OUTPATIENT)
Dept: PHYSICAL THERAPY | Facility: CLINIC | Age: 59
End: 2024-10-17
Payer: COMMERCIAL

## 2024-10-21 ENCOUNTER — APPOINTMENT (OUTPATIENT)
Dept: PHYSICAL THERAPY | Facility: CLINIC | Age: 59
End: 2024-10-21
Payer: COMMERCIAL

## 2024-10-24 ENCOUNTER — OFFICE VISIT (OUTPATIENT)
Dept: PHYSICAL THERAPY | Facility: CLINIC | Age: 59
End: 2024-10-24
Payer: COMMERCIAL

## 2024-10-24 DIAGNOSIS — M25.521 RIGHT ELBOW PAIN: Primary | ICD-10-CM

## 2024-10-24 PROCEDURE — 97112 NEUROMUSCULAR REEDUCATION: CPT | Performed by: PHYSICAL THERAPIST

## 2024-10-24 PROCEDURE — 97140 MANUAL THERAPY 1/> REGIONS: CPT | Performed by: PHYSICAL THERAPIST

## 2024-10-24 NOTE — PROGRESS NOTES
Daily Note     Today's date: 10/24/2024  Patient name: Theresa Kim  : 1965  MRN: [id number]  Referring provider: oJselin Brumfield DO  Dx:   Encounter Diagnosis     ICD-10-CM    1. Right elbow pain  M25.521                      Subjective: She reports that she continues to feel stiffness in her arm.       Objective: See treatment diary below      Assessment: Tolerated treatment well; initiated POC with UBE for active warmup. Vcs provided on proper sequencing with exercises; she demonstrates fatigue and thus continued decreased muscle endurance. MT performed after receiving consent from pt; she has increased tone to extensor mass which improves with TPR however results in soreness. Concluded with MH to R elbow post session.  Patient demonstrated fatigue post treatment and would benefit from continued PT      Plan: Continue per plan of care.      Precautions: No past medical history on file. HTN        Access Code: [email or web address]  URL: [email or web address]  Date: 2024  Prepared by: Nancy Colby    Exercises  - Median Nerve Flossing - Tray  - 1 x daily - 7 x weekly - 2 sets - 10 reps  - Ulnar Nerve Flossing  - 1 x daily - 7 x weekly - 2 sets - 10 reps  - Seated Cervical Retraction and Extension  - 1 x daily - 7 x weekly - 3 sets - 10 reps  - Neck Retraction  - 1 x daily - 7 x weekly - 3 sets - 10 reps            Date 2/26 3/1 3/4 3/7 3/12 3/15         Visit # 1 2 3 4 5 6         FOTO done              Re-eval                     Manuals 9/23 9/26 10/10 10/24    9/16   PROM           Tspine           Elbow mobilization           Taping Tricep                       Tricep IASTM           STM L extensor and flex stretch SMF SMF SMF SMF        IASTM along extensor mass SMF ext mass SMF ext mass SMF ext mass SMF ext and flex mass    SMF ext mass   Active release of Extensor mass  NV?         L flexion and ext stretch SMF SMF (wrist extension stretch only) SMF (wrist flex and ext stretch)  SMF (wrist flex and ext stretch)        Cupping NV?       SMF along ext mass only   PA glides cervical            Cervical jt mobs                       Neuro Re-Ed           Rhythmic stab           Prone Ys, Ts, Is           Scap squeezes           Prone ext           Median nerve glides With side bend away: 2x10 With side bend away: 2x10 NV NV    With side bend away: 2x10   Ulnar nerve glides           Radial N glides with head turn to the left With side bend away: 2x10 With side bend away: 2x10 NV NV    With side bend away: 2x10   TB B Scap Retraction         NV?   TB B ' S Ext        NV?   Seated thoracic ext           Standing Thoracic Extension with hands behind head with elbows sliding up wall           Bicep stretch using [employer]           Pec stretch in doorway           Elbow Flexion stretch c small towel           Seated thoracic extension stretch  (soccer ball)  1x10 (hands clasped) 1x10 (hands clasped) NV? NV?    NV?   Sideying Thoracic Rotation stretch           Cerivcal retraction           Cervical retraction to exntesion           Ther Ex           UBE 2 mins fwd/retro 2 mins fwd/ retro 2 mins fwd/retro 2 mins fwd/retro    2 mins fwd/retro   Wrist ext/flex DB 4# 30 ea (rolling on finger tips during flex) 4# 30 ea (rolling on finger tips during flex) 4# 30 ea (rolling on finger tips during flex) 4# 30 ea (rolling on finger tips during flex)    4# 30 ea (rolling on finger tips during flex)   Diggiflex (all, individual) Red (all): 30; yellow (individual): 30 Red (all): 30; Yellow (individual): 30 Red (all): 30; Yellow (individual): 30 Red (all): 30; Yellow (individual): 30    Red (all): 30; yellow (individual): 30   Metal gripper 50# 30 50# 30  50# 30    50# 30   Pronation with supination during elbow flexion  6# 30 NV 6# 30                             Tricep ext           Rows, Ext            Wrist flex, ext stretch            strengthening            No moneys  NV? NV? NV? NV?                   Supination stretch           Tricep stretch           Therastick wrist flexion and extension eccentric Green: 5 sec x 30 ea Green: 5 sec x 30 ea Green: 5 sec x 30 ea NV?    Green: 5 sec x 30 ea   Therastick shakes AP, M/L Green: 40 sec x 1 ea (straight arm ea)  Green: 40 sec x 1 ea (straight arm ea) Green: 40 sec x 1 ea (straight arm ea) NV?    Green: 40 sec x 1 ea (straight arm ea)              Supination and pronation 4# 30 ea 4# 30 ea 4# 30 ea  4# 30 ea    4# 30 ea    DB RD ecc 3# 30 ea 3# 30 ea 3# 30 ea 3# 30 ea    3# 30 ea   Supination and pronation with Hammer+weight 2# ankle weights: 10 ea 2# ankle weights: 20 ea NV? NV?    1.5# ankle weights: 30 ea   Ther Activity            Dunaway carry with slight elevation of shoulders and scap retraction 10# 3 laps ea 10# 3 laps ea NV? 10# 3 laps ea    9# 3 laps ea   DB hold with towel            DB carrying with head of weight                       Gait Training                                   Modalities

## 2024-10-28 ENCOUNTER — APPOINTMENT (OUTPATIENT)
Dept: PHYSICAL THERAPY | Facility: CLINIC | Age: 59
End: 2024-10-28
Payer: COMMERCIAL

## 2024-10-28 ENCOUNTER — OFFICE VISIT (OUTPATIENT)
Dept: PHYSICAL THERAPY | Facility: CLINIC | Age: 59
End: 2024-10-28
Payer: COMMERCIAL

## 2024-10-28 DIAGNOSIS — M25.521 RIGHT ELBOW PAIN: Primary | ICD-10-CM

## 2024-10-28 PROCEDURE — 97110 THERAPEUTIC EXERCISES: CPT | Performed by: PHYSICAL THERAPIST

## 2024-10-28 PROCEDURE — 97112 NEUROMUSCULAR REEDUCATION: CPT | Performed by: PHYSICAL THERAPIST

## 2024-10-28 PROCEDURE — 97140 MANUAL THERAPY 1/> REGIONS: CPT | Performed by: PHYSICAL THERAPIST

## 2024-10-28 NOTE — PROGRESS NOTES
Daily Note     Today's date: 10/28/2024  Patient name: Theresa Kim  : 1965  MRN: [id number]  Referring provider: Joselin Brumfield DO  Dx:   Encounter Diagnosis     ICD-10-CM    1. Right elbow pain  M25.521                      Subjective: Patient reports that she was sore after last time however she reports that is does help.       Objective: See treatment diary below      Assessment: Tolerated treatment well; initiated POC with UBE for active warmup to increase circulation. Vcs provided on proper sequencing with exercises; increased resistance with bicep curls. She fatigues with exercises. MT performed after receiving consent from pt; she has increased tone along medial aspect of extensor mass which improves post TPR/STM/IASM.  Patient demonstrated fatigue post treatment and would benefit from continued PT      Plan: Continue per plan of care.      Precautions: No past medical history on file. HTN        Access Code: [email or web address]  URL: [email or web address]  Date: 2024  Prepared by: Nancy Colby    Exercises  - Median Nerve Flossing - Tray  - 1 x daily - 7 x weekly - 2 sets - 10 reps  - Ulnar Nerve Flossing  - 1 x daily - 7 x weekly - 2 sets - 10 reps  - Seated Cervical Retraction and Extension  - 1 x daily - 7 x weekly - 3 sets - 10 reps  - Neck Retraction  - 1 x daily - 7 x weekly - 3 sets - 10 reps            Date 2/26 3/1 3/4 3/7 3/12 3/15         Visit # 1 2 3 4 5 6         FOTO done              Re-eval                     Manuals 9/23 9/26 10/10 10/24 10/28   9/16   PROM           Tspine           Elbow mobilization           Taping Tricep                       Tricep IASTM           STM L extensor and flex stretch SMF SMF SMF SMF  SMF ea      IASTM along extensor mass SMF ext mass SMF ext mass SMF ext mass SMF ext and flex mass SMF ext and flex mass   SMF ext mass   Active release of Extensor mass  NV?         L flexion and ext stretch SMF SMF (wrist extension stretch  only) SMF (wrist flex and ext stretch) SMF (wrist flex and ext stretch)  NV?      Cupping NV?       SMF along ext mass only   PA glides cervical            Cervical jt mobs                       Neuro Re-Ed           Rhythmic stab           Prone Ys, Ts, Is           Scap squeezes           Prone ext           Median nerve glides With side bend away: 2x10 With side bend away: 2x10 NV NV With side bend away: 2x10   With side bend away: 2x10   Ulnar nerve glides           Radial N glides with head turn to the left With side bend away: 2x10 With side bend away: 2x10 NV NV With side bend away: 2x10   With side bend away: 2x10   TB B Scap Retraction         NV?   TB B ' S Ext        NV?   Seated thoracic ext           Standing Thoracic Extension with hands behind head with elbows sliding up wall           Bicep stretch using [facility] stretch in doorway           Elbow Flexion stretch c small towel           Seated thoracic extension stretch  (soccer ball)  1x10 (hands clasped) 1x10 (hands clasped) NV? NV? Half foam: 2x10   NV?   Sideying Thoracic Rotation stretch           Cerivcal retraction           Cervical retraction to exntesion           Ther Ex           UBE 2 mins fwd/retro 2 mins fwd/ retro 2 mins fwd/retro 2 mins fwd/retro 2 mins wd/retro   2 mins fwd/retro   Wrist ext/flex DB 4# 30 ea (rolling on finger tips during flex) 4# 30 ea (rolling on finger tips during flex) 4# 30 ea (rolling on finger tips during flex) 4# 30 ea (rolling on finger tips during flex) 4# 30 ea (rolling on finger tips during flex)   4# 30 ea (rolling on finger tips during flex)   Diggiflex (all, individual) Red (all): 30; yellow (individual): 30 Red (all): 30; Yellow (individual): 30 Red (all): 30; Yellow (individual): 30 Red (all): 30; Yellow (individual): 30 Red (all): 30  Yellow (individual: 30   Red (all): 30; yellow (individual): 30   Metal gripper 50# 30 50# 30  50# 30 50# 30   50# 30   Pronation with supination during  elbow flexion  6# 30 NV 6# 30 7# 30                            Tricep ext           Rows, Ext            Wrist flex, ext stretch            strengthening            No moneys  NV? NV? NV? NV? NV?                 Supination stretch           Tricep stretch           Therastick wrist flexion and extension eccentric Green: 5 sec x 30 ea Green: 5 sec x 30 ea Green: 5 sec x 30 ea NV? Green: 5 sec x 30 ea   Green: 5 sec x 30 ea   Therastick shakes AP, M/L Green: 40 sec x 1 ea (straight arm ea)  Green: 40 sec x 1 ea (straight arm ea) Green: 40 sec x 1 ea (straight arm ea) NV? Green: 40 sec x 1 ea (straight arm ea)   Green: 40 sec x 1 ea (straight arm ea)              Supination and pronation 4# 30 ea 4# 30 ea 4# 30 ea  4# 30 ea 4# 30 ea   4# 30 ea    DB RD ecc 3# 30 ea 3# 30 ea 3# 30 ea 3# 30 ea 3# 30 ea   3# 30 ea   Supination and pronation with Hammer+weight 2# ankle weights: 10 ea 2# ankle weights: 20 ea NV? NV? NV?   1.5# ankle weights: 30 ea   Ther Activity            Dunaway carry with slight elevation of shoulders and scap retraction 10# 3 laps ea 10# 3 laps ea NV? 10# 3 laps ea 10# 3 laps   9# 3 laps ea   DB hold with towel            DB carrying with head of weight                       Gait Training                                   Modalities            MH

## 2024-11-04 ENCOUNTER — APPOINTMENT (OUTPATIENT)
Dept: PHYSICAL THERAPY | Facility: CLINIC | Age: 59
End: 2024-11-04
Payer: COMMERCIAL

## 2024-11-07 ENCOUNTER — OFFICE VISIT (OUTPATIENT)
Dept: PHYSICAL THERAPY | Facility: CLINIC | Age: 59
End: 2024-11-07
Payer: COMMERCIAL

## 2024-11-07 DIAGNOSIS — M25.521 RIGHT ELBOW PAIN: Primary | ICD-10-CM

## 2024-11-07 PROCEDURE — 97140 MANUAL THERAPY 1/> REGIONS: CPT | Performed by: PHYSICAL THERAPIST

## 2024-11-07 PROCEDURE — 97110 THERAPEUTIC EXERCISES: CPT | Performed by: PHYSICAL THERAPIST

## 2024-11-07 PROCEDURE — 97112 NEUROMUSCULAR REEDUCATION: CPT | Performed by: PHYSICAL THERAPIST

## 2024-11-07 NOTE — PROGRESS NOTES
Daily Note     Today's date: 2024  Patient name: Theresa Kim  : 1965  MRN: [id number]  Referring provider: Joselin Brumfield DO  Dx:   Encounter Diagnosis     ICD-10-CM    1. Right elbow pain  M25.521                      Subjective: She reports feeling well overall.       Objective: See treatment diary below      Assessment: Tolerated treatment well; initiated POC with SciFit for BUE. Vcs provided on proper sequencing with exercises. MT performed after receiving consent from pt; she demonstrates improved tone with extensor mass today however medial aspect continues to have hypertrophy which improves post MT.  Patient demonstrated fatigue post treatment and would benefit from continued PT      Plan: Continue per plan of care.      Precautions: No past medical history on file. HTN        Access Code: [email or web address]  URL: [email or web address]  Date: 2024  Prepared by: Nancy Colby    Exercises  - Median Nerve Flossing - Tray  - 1 x daily - 7 x weekly - 2 sets - 10 reps  - Ulnar Nerve Flossing  - 1 x daily - 7 x weekly - 2 sets - 10 reps  - Seated Cervical Retraction and Extension  - 1 x daily - 7 x weekly - 3 sets - 10 reps  - Neck Retraction  - 1 x daily - 7 x weekly - 3 sets - 10 reps            Date 2/26 3/1 3/4 3/7 3/12 3/15         Visit # 1 2 3 4 5 6         FOTO done              Re-eval                     Manuals 9/23 9/26 10/10 10/24 10/28 11/7  9/16   PROM           Tspine           Elbow mobilization           Taping Tricep                       Tricep IASTM           STM L extensor and flex stretch SMF SMF SMF SMF  SMF ea SMF ea     IASTM along extensor mass SMF ext mass SMF ext mass SMF ext mass SMF ext and flex mass SMF ext and flex mass SMF ext and flex mass  SMF ext mass   Active release of Extensor mass  NV?         L flexion and ext stretch SMF SMF (wrist extension stretch only) SMF (wrist flex and ext stretch) SMF (wrist flex and ext stretch)  NV? SMF ea      Cupping NV?       SMF along ext mass only   PA glides cervical            Cervical jt mobs                       Neuro Re-Ed           Rhythmic stab           Prone Ys, Ts, Is           Scap squeezes           Prone ext           Median nerve glides With side bend away: 2x10 With side bend away: 2x10 NV NV With side bend away: 2x10 With side bend away: 2x10  With side bend away: 2x10   Ulnar nerve glides           Radial N glides with head turn to the left With side bend away: 2x10 With side bend away: 2x10 NV NV With side bend away: 2x10 With side bend away: 2x10  With side bend away: 2x10   TB B Scap Retraction         NV?   TB B ' S Ext        NV?   Seated thoracic ext           Standing Thoracic Extension with hands behind head with elbows sliding up wall           Bicep stretch using [employer] stretch in doorway           Elbow Flexion stretch c small towel           Seated thoracic extension stretch  (soccer ball)  1x10 (hands clasped) 1x10 (hands clasped) NV? NV? Half foam: 2x10 Half foam: 2x10  NV?   Sideying Thoracic Rotation stretch           Cerivcal retraction           Cervical retraction to exntesion           Ther Ex           UBE 2 mins fwd/retro 2 mins fwd/ retro 2 mins fwd/retro 2 mins fwd/retro 2 mins wd/retro 2 mins fwd/retro  2 mins fwd/retro   Wrist ext/flex DB 4# 30 ea (rolling on finger tips during flex) 4# 30 ea (rolling on finger tips during flex) 4# 30 ea (rolling on finger tips during flex) 4# 30 ea (rolling on finger tips during flex) 4# 30 ea (rolling on finger tips during flex) 4# 30 ea (rolling on finger tips during flex)  4# 30 ea (rolling on finger tips during flex)   Diggiflex (all, individual) Red (all): 30; yellow (individual): 30 Red (all): 30; Yellow (individual): 30 Red (all): 30; Yellow (individual): 30 Red (all): 30; Yellow (individual): 30 Red (all): 30  Yellow (individual: 30 Red (all): 30; Yellow (individual): 30   Red (all): 30; yellow (individual): 30    Metal gripper 50# 30 50# 30  50# 30 50# 30 NV?  50# 30   Pronation with supination during elbow flexion  6# 30 NV 6# 30 7# 30 NV?                           Tricep ext           Rows, Ext            Wrist flex, ext stretch            strengthening            No moneys  NV? NV? NV? NV? NV? NV?                Supination stretch           Tricep stretch           Therastick wrist flexion and extension eccentric Green: 5 sec x 30 ea Green: 5 sec x 30 ea Green: 5 sec x 30 ea NV? Green: 5 sec x 30 ea Green: 5 sec x 30 ea  Green: 5 sec x 30 ea   Therastick shakes AP, M/L Green: 40 sec x 1 ea (straight arm ea)  Green: 40 sec x 1 ea (straight arm ea) Green: 40 sec x 1 ea (straight arm ea) NV? Green: 40 sec x 1 ea (straight arm ea) Green: 40 sec x 1 ea (straight arm ea)   Green: 40 sec x 1 ea (straight arm ea)              Supination and pronation 4# 30 ea 4# 30 ea 4# 30 ea  4# 30 ea 4# 30 ea 4# 30 ea  4# 30 ea    DB RD ecc 3# 30 ea 3# 30 ea 3# 30 ea 3# 30 ea 3# 30 ea 3# 30 ea  3# 30 ea   Supination and pronation with Hammer+weight 2# ankle weights: 10 ea 2# ankle weights: 20 ea NV? NV? NV?   1.5# ankle weights: 30 ea   Ther Activity            Dunaway carry with slight elevation of shoulders and scap retraction 10# 3 laps ea 10# 3 laps ea NV? 10# 3 laps ea 10# 3 laps NV?  9# 3 laps ea   DB hold with towel            DB carrying with head of weight                       Gait Training                                   Modalities

## 2024-11-11 ENCOUNTER — APPOINTMENT (OUTPATIENT)
Dept: PHYSICAL THERAPY | Facility: CLINIC | Age: 59
End: 2024-11-11
Payer: COMMERCIAL

## 2024-11-14 ENCOUNTER — OFFICE VISIT (OUTPATIENT)
Dept: PHYSICAL THERAPY | Facility: CLINIC | Age: 59
End: 2024-11-14
Payer: COMMERCIAL

## 2024-11-14 DIAGNOSIS — M25.521 RIGHT ELBOW PAIN: Primary | ICD-10-CM

## 2024-11-14 PROCEDURE — 97110 THERAPEUTIC EXERCISES: CPT | Performed by: PHYSICAL THERAPIST

## 2024-11-14 PROCEDURE — 97112 NEUROMUSCULAR REEDUCATION: CPT | Performed by: PHYSICAL THERAPIST

## 2024-11-14 PROCEDURE — 97140 MANUAL THERAPY 1/> REGIONS: CPT | Performed by: PHYSICAL THERAPIST

## 2024-11-14 NOTE — PROGRESS NOTES
Daily Note     Today's date: 2024  Patient name: Theresa Kim  : 1965  MRN: [id number]  Referring provider: Joselin Brumfield DO  Dx:   Encounter Diagnosis     ICD-10-CM    1. Right elbow pain  M25.521                      Subjective: Patient reports overall feeling good. She has not recently had to lift anything.       Objective: See treatment diary below      Assessment: Tolerated treatment well; initiated POC with SciFit with BUE. Vcs provided on proper sequencing with exercises. MT performed after receiving consent from pt; she continues to have increased along medial aspect of extensor mass which improves post TPR/IASTM. She demonstrates increased tone compared to last visit. She also experiences fatigue with her program but denies sxs increase.   Patient demonstrated fatigue post treatment and would benefit from continued PT      Plan: Continue per plan of care.      Precautions: No past medical history on file. HTN        Access Code: [email or web address]  URL: [email or web address]  Date: 2024  Prepared by: Nancy Colby    Exercises  - Median Nerve Flossing - Tray  - 1 x daily - 7 x weekly - 2 sets - 10 reps  - Ulnar Nerve Flossing  - 1 x daily - 7 x weekly - 2 sets - 10 reps  - Seated Cervical Retraction and Extension  - 1 x daily - 7 x weekly - 3 sets - 10 reps  - Neck Retraction  - 1 x daily - 7 x weekly - 3 sets - 10 reps            Date 2/26 3/1 3/4 3/7 3/12 3/15         Visit # 1 2 3 4 5 6         FOTO done              Re-eval                     Manuals 9/23 9/26 10/10 10/24 10/28 11/7 11/14    PROM           Tspine           Elbow mobilization           Taping Tricep                       Tricep IASTM           STM L extensor and flex stretch SMF SMF SMF SMF  SMF ea SMF ea SMF     IASTM along extensor mass SMF ext mass SMF ext mass SMF ext mass SMF ext and flex mass SMF ext and flex mass SMF ext and flex mass SMF ext and flex mass    Active release of Extensor mass   NV?         L flexion and ext stretch SMF SMF (wrist extension stretch only) SMF (wrist flex and ext stretch) SMF (wrist flex and ext stretch)  NV? SMF ea SMF ea    Cupping NV?          PA glides cervical            Cervical jt mobs                       Neuro Re-Ed           Rhythmic stab           Prone Ys, Ts, Is           Scap squeezes           Prone ext           Median nerve glides With side bend away: 2x10 With side bend away: 2x10 NV NV With side bend away: 2x10 With side bend away: 2x10 With side bend away: 2x10    Ulnar nerve glides           Radial N glides with head turn to the left With side bend away: 2x10 With side bend away: 2x10 NV NV With side bend away: 2x10 With side bend away: 2x10 With side bend away: 2x10    TB B Scap Retraction            TB B ' S Ext           Seated thoracic ext           Standing Thoracic Extension with hands behind head with elbows sliding up wall           Bicep stretch using [employer]           Pec stretch in doorway           Elbow Flexion stretch c small towel           Seated thoracic extension stretch  (soccer ball)  1x10 (hands clasped) 1x10 (hands clasped) NV? NV? Half foam: 2x10 Half foam: 2x10 NV?    Sideying Thoracic Rotation stretch           Cerivcal retraction           Cervical retraction to exntesion           Ther Ex           UBE 2 mins fwd/retro 2 mins fwd/ retro 2 mins fwd/retro 2 mins fwd/retro 2 mins wd/retro 2 mins fwd/retro 2 mins fwd/retro    Wrist ext/flex DB 4# 30 ea (rolling on finger tips during flex) 4# 30 ea (rolling on finger tips during flex) 4# 30 ea (rolling on finger tips during flex) 4# 30 ea (rolling on finger tips during flex) 4# 30 ea (rolling on finger tips during flex) 4# 30 ea (rolling on finger tips during flex) 4# 30 ea (rolling on finger tips during flex)               Diggiflex (all, individual) Red (all): 30; yellow (individual): 30 Red (all): 30; Yellow (individual): 30 Red (all): 30; Yellow (individual): 30 Red (all): 30;  Yellow (individual): 30 Red (all): 30  Yellow (individual: 30 Red (all): 30; Yellow (individual): 30  Red (all): 30; Yellow individual: 30    Metal gripper 50# 30 50# 30  50# 30 50# 30 NV? 50# 30    Pronation with supination during elbow flexion  6# 30 NV 6# 30 7# 30 NV? 7# 30                           Tricep ext           Rows, Ext            Wrist flex, ext stretch            strengthening            No moneys  NV? NV? NV? NV? NV? NV?                Supination stretch           Tricep stretch           Therastick wrist flexion and extension eccentric Green: 5 sec x 30 ea Green: 5 sec x 30 ea Green: 5 sec x 30 ea NV? Green: 5 sec x 30 ea Green: 5 sec x 30 ea Green: 5 sec x 30 ea    Therastick shakes AP, M/L Green: 40 sec x 1 ea (straight arm ea)  Green: 40 sec x 1 ea (straight arm ea) Green: 40 sec x 1 ea (straight arm ea) NV? Green: 40 sec x 1 ea (straight arm ea) Green: 40 sec x 1 ea (straight arm ea)  Green: 40 sec x 1 ea (straight arm ea)               Supination and pronation 4# 30 ea 4# 30 ea 4# 30 ea  4# 30 ea 4# 30 ea 4# 30 ea 4# 30 ea    DB RD ecc 3# 30 ea 3# 30 ea 3# 30 ea 3# 30 ea 3# 30 ea 3# 30 ea 3# 30 ea    Supination and pronation with Hammer+weight 2# ankle weights: 10 ea 2# ankle weights: 20 ea NV? NV? NV?      Ther Activity            Dunaway carry with slight elevation of shoulders and scap retraction 10# 3 laps ea 10# 3 laps ea NV? 10# 3 laps ea 10# 3 laps NV? 10# 3 laps ea    DB hold with towel            DB carrying with head of weight                       Gait Training                                   Modalities            MH

## 2024-11-21 ENCOUNTER — OFFICE VISIT (OUTPATIENT)
Dept: PHYSICAL THERAPY | Facility: CLINIC | Age: 59
End: 2024-11-21
Payer: COMMERCIAL

## 2024-11-21 DIAGNOSIS — M25.521 RIGHT ELBOW PAIN: Primary | ICD-10-CM

## 2024-11-21 PROCEDURE — 97140 MANUAL THERAPY 1/> REGIONS: CPT | Performed by: PHYSICAL THERAPIST

## 2024-11-21 PROCEDURE — 97110 THERAPEUTIC EXERCISES: CPT | Performed by: PHYSICAL THERAPIST

## 2024-11-21 PROCEDURE — 97112 NEUROMUSCULAR REEDUCATION: CPT | Performed by: PHYSICAL THERAPIST

## 2024-11-21 NOTE — PROGRESS NOTES
Daily Note     Today's date: 2024  Patient name: Theresa Kim  : 1965  MRN: [id number]  Referring provider: Joselin Brumfield DO  Dx:   Encounter Diagnosis     ICD-10-CM    1. Right elbow pain  M25.521                      Subjective:  She reports that she had to use the mouse on her computer during work today and now presents with increased soreness.       Objective: See treatment diary below      Assessment: Tolerated treatment well; initiated POC with UBE for active warmup. Vcs provided on proper sequencing with exercises; modified program due to increased soreness today. MT performed after receiving consent from pt; she continues to have increased tone along extensor mass which improves post STM/TPR. Discussed probable DOMS post session and encouraged stretching and modalities.   Patient demonstrated fatigue post treatment and would benefit from continued PT      Plan: Continue per plan of care.      Precautions: No past medical history on file. HTN        Access Code: [email or web address]  URL: [email or web address]  Date: 2024  Prepared by: Nancy Colby    Exercises  - Median Nerve Flossing - Tray  - 1 x daily - 7 x weekly - 2 sets - 10 reps  - Ulnar Nerve Flossing  - 1 x daily - 7 x weekly - 2 sets - 10 reps  - Seated Cervical Retraction and Extension  - 1 x daily - 7 x weekly - 3 sets - 10 reps  - Neck Retraction  - 1 x daily - 7 x weekly - 3 sets - 10 reps            Date 2/26 3/1 3/4 3/7 3/12 3/15         Visit # 1 2 3 4 5 6         FOTO done              Re-eval                     Manuals 9/23 9/26 10/10 10/24 10/28 11/7 11/14 11/21   PROM           Tspine           Elbow mobilization           Taping Tricep                       Tricep IASTM           STM L extensor and flex stretch SMF SMF SMF SMF  SMF ea SMF ea SMF  SMF    IASTM along extensor mass SMF ext mass SMF ext mass SMF ext mass SMF ext and flex mass SMF ext and flex mass SMF ext and flex mass SMF ext and flex  mass SMF ext and flex mass   Active release of Extensor mass  NV?         L flexion and ext stretch SMF SMF (wrist extension stretch only) SMF (wrist flex and ext stretch) SMF (wrist flex and ext stretch)  NV? SMF ea SMF ea SMF ea   Cupping NV?          PA glides cervical            Cervical jt mobs                       Neuro Re-Ed           Rhythmic stab           Prone Ys, Ts, Is           Scap squeezes           Prone ext           Median nerve glides With side bend away: 2x10 With side bend away: 2x10 NV NV With side bend away: 2x10 With side bend away: 2x10 With side bend away: 2x10 With side bend away: 2x10   Ulnar nerve glides           Radial N glides with head turn to the left With side bend away: 2x10 With side bend away: 2x10 NV NV With side bend away: 2x10 With side bend away: 2x10 With side bend away: 2x10 With side bend away: 2x10   TB B Scap Retraction            TB B ' S Ext           Seated thoracic ext           Standing Thoracic Extension with hands behind head with elbows sliding up wall           Bicep stretch using [employer]           Pec stretch in doorway           Elbow Flexion stretch c small towel           Seated thoracic extension stretch  (soccer ball)  1x10 (hands clasped) 1x10 (hands clasped) NV? NV? Half foam: 2x10 Half foam: 2x10 NV?    Sideying Thoracic Rotation stretch           Cerivcal retraction           Cervical retraction to exntesion           Ther Ex           UBE 2 mins fwd/retro 2 mins fwd/ retro 2 mins fwd/retro 2 mins fwd/retro 2 mins wd/retro 2 mins fwd/retro 2 mins fwd/retro 2 mins fwd/retro   Wrist ext/flex DB 4# 30 ea (rolling on finger tips during flex) 4# 30 ea (rolling on finger tips during flex) 4# 30 ea (rolling on finger tips during flex) 4# 30 ea (rolling on finger tips during flex) 4# 30 ea (rolling on finger tips during flex) 4# 30 ea (rolling on finger tips during flex) 4# 30 ea (rolling on finger tips during flex) 4# 30 ea (rolling on finger tips during  flex)              Diggiflex (all, individual) Red (all): 30; yellow (individual): 30 Red (all): 30; Yellow (individual): 30 Red (all): 30; Yellow (individual): 30 Red (all): 30; Yellow (individual): 30 Red (all): 30  Yellow (individual: 30 Red (all): 30; Yellow (individual): 30  Red (all): 30; Yellow individual: 30 Red (all): 30; Yellow (individual): 30    Metal gripper 50# 30 50# 30  50# 30 50# 30 NV? 50# 30 50# 30   Pronation with supination during elbow flexion  6# 30 NV 6# 30 7# 30 NV? 7# 30  NV                         Tricep ext           Rows, Ext            Wrist flex, ext stretch            strengthening            No moneys  NV? NV? NV? NV? NV? NV?                Supination stretch           Tricep stretch           Therastick wrist flexion and extension eccentric Green: 5 sec x 30 ea Green: 5 sec x 30 ea Green: 5 sec x 30 ea NV? Green: 5 sec x 30 ea Green: 5 sec x 30 ea Green: 5 sec x 30 ea Green: 5 sec x 30 ea   Therastick shakes AP, M/L Green: 40 sec x 1 ea (straight arm ea)  Green: 40 sec x 1 ea (straight arm ea) Green: 40 sec x 1 ea (straight arm ea) NV? Green: 40 sec x 1 ea (straight arm ea) Green: 40 sec x 1 ea (straight arm ea)  Green: 40 sec x 1 ea (straight arm ea) Green: 40 sec x 1 ea (straight arm)              Supination and pronation 4# 30 ea 4# 30 ea 4# 30 ea  4# 30 ea 4# 30 ea 4# 30 ea 4# 30 ea 4# 30 ea   DB RD ecc 3# 30 ea 3# 30 ea 3# 30 ea 3# 30 ea 3# 30 ea 3# 30 ea 3# 30 ea 3# 30 ea   Supination and pronation with Hammer+weight 2# ankle weights: 10 ea 2# ankle weights: 20 ea NV? NV? NV?      Ther Activity            Dunaway carry with slight elevation of shoulders and scap retraction 10# 3 laps ea 10# 3 laps ea NV? 10# 3 laps ea 10# 3 laps NV? 10# 3 laps ea 10# 3 laps ea   DB hold with towel            DB carrying with head of weight                       Gait Training                                   Modalities            MH

## 2024-11-25 ENCOUNTER — APPOINTMENT (OUTPATIENT)
Dept: PHYSICAL THERAPY | Facility: CLINIC | Age: 59
End: 2024-11-25
Payer: COMMERCIAL

## 2024-11-26 ENCOUNTER — APPOINTMENT (OUTPATIENT)
Dept: PHYSICAL THERAPY | Facility: CLINIC | Age: 59
End: 2024-11-26
Payer: COMMERCIAL

## 2024-12-02 ENCOUNTER — APPOINTMENT (OUTPATIENT)
Dept: PHYSICAL THERAPY | Facility: CLINIC | Age: 59
End: 2024-12-02
Payer: COMMERCIAL

## 2024-12-05 ENCOUNTER — APPOINTMENT (OUTPATIENT)
Dept: PHYSICAL THERAPY | Facility: CLINIC | Age: 59
End: 2024-12-05
Payer: COMMERCIAL

## 2024-12-12 ENCOUNTER — OFFICE VISIT (OUTPATIENT)
Dept: PHYSICAL THERAPY | Facility: CLINIC | Age: 59
End: 2024-12-12
Payer: COMMERCIAL

## 2024-12-12 DIAGNOSIS — M25.521 RIGHT ELBOW PAIN: Primary | ICD-10-CM

## 2024-12-12 PROCEDURE — 97140 MANUAL THERAPY 1/> REGIONS: CPT | Performed by: PHYSICAL THERAPIST

## 2024-12-12 PROCEDURE — 97110 THERAPEUTIC EXERCISES: CPT | Performed by: PHYSICAL THERAPIST

## 2024-12-12 PROCEDURE — 97112 NEUROMUSCULAR REEDUCATION: CPT | Performed by: PHYSICAL THERAPIST

## 2024-12-12 NOTE — PROGRESS NOTES
PT Evaluation     Today's date: 2024  Patient name: Theresa Kim  : 1965  MRN: [id number]  Referring provider: Joselin Brumfield DO  Dx:   Encounter Diagnosis     ICD-10-CM    1. Right elbow pain  M25.521               Assessment  Assessment details: Theresa is a pleasant 57 yo female presenting to OP PT secondary to Right elbow pain with insidious onset 1 year ago. She denies DALLIN, and reports no numbness/tingling. She reports having improvements with her strength in being able to hold a watering can approximately 2 gallons when she was not able to do prior. Patient overall has been improving since I.E however she recently fell landing on her R side. She also has been busy with work and unable to attend therapy thus indicating continued benefits from skilled OP PT.   Overall, she reports having at least a 75% improvement since I.E. Pt presents continued but improving wrist and elbow strength deficits, periscap strength deficits, and pain at end range elbow/flex ext. Pain is increased along triceps with palmation and increase posterior medial elbow pain with valgus stress test. Pt would benefit from skilled PT to address stated deficits and improve tolerance to daily activiites and improve maximal function.  Impairments: impaired physical strength and pain with function    Symptom irritability: moderateUnderstanding of Dx/Px/POC: good   Prognosis: good    Goals  Pt will demonstrate Pope with progressive home exercise program to assist with PT carry over and prevent recurrence of symptoms in the future - Goal met  Pt will demonstrate 20% improvement in FOTO score to increase tolerance to functional return. - Progressing   Pt will demonstrate 20 deg improvement in shoulder ROM to increase tolerance to reaching overhead, behind neck, and behind back to increase ease with ADLs such dressing/bathing - Progressing   Pt will demonstrate 4+/5 in UE strength to allow for improved tolerance to  lifting items overhead. - Progressing   Pt will demonstrate 4+/5 in periscap strength to improve posture in sitting. - Progressing       Plan  Patient would benefit from: PT eval and skilled physical therapy  Planned modality interventions: TENS, manual electrical stimulation, low level laser therapy, thermotherapy: hydrocollator packs and cryotherapy  Planned therapy interventions: IASTM, joint mobilization, manual therapy, massage, nerve gliding, patient education, neuromuscular re-education, stretching, strengthening, therapeutic activities and therapeutic exercise  Frequency: 2x week  Plan of Care beginning date: 12/12/2024  Plan of Care expiration date: 3/6/2025  Treatment plan discussed with: patient        Subjective Evaluation    History of Present Illness    RE: 12/12/24: Patient has not been able to attend as much therapy due to visit limit as well as traveling for work. She has noticed a regression with increased sxs. She also recently fell while away and landed on her R side. She reports feeling more tight today.     RE: 9/12/2024: Patient reports overall sxs have improved however recent pain noted with riding her Ebike.     RE: 6/24/24: Patient reports that she continues to have soreness in her arm and tightness especially when she overworks her arm such as being in the garden. Overall she does report that she has improved in her strength.       RE 4/4/24: Patient was away on vacation for two weeks and reports that her sxs did improve. She did keep up with her nerve glide exercises. The pain has returned since returning to work and using the mouse. She has been at a constant 7/10 pain level this week.     Mechanism of injury: Theresa is a 59 yo female presenting to OP PT secondary to Right elbow pain with onset 1 year ago. Pt reports sx are aggravated with working on computer using mouse and with maintaining elbow in end range positions. She reports weakness with carrying items, working overhead, and  completing yard work. She reports some relief with Advil, massage, and compression stretch.          Recurrent probem    Quality of life: good    Patient Goals  Patient goals for therapy: increased strength  Patient goal: Stronger and less pain;  Pain  Current pain ratin  At best pain ratin  At worst pain ratin  Location: Elbow  Quality: dull ache  Aggravating factors: lifting, sitting, standing and stair climbing, opening jars    Social Support  Stairs in house: yes   Lives in: multiple-level home  Lives with: significant other    Employment status: working  Hand dominance: right      Diagnostic Tests  X-ray: normal  Treatments  No previous or current treatments        Objective     Observations     Right Elbow   Negative for edema, effusion and incisions.     Palpation     Right   Tenderness of the triceps and wrist flexors.     Active Range of Motion     Right Elbow   Normal active range of motion  Elbow hyperextension  Extension: with pain    CROM:  Flexion: TBA  Extension: TBA  Rotation: (R) TBA (L) TBA  Lat: (R) TBA (L) TBA    Passive Range of Motion     Right Elbow   Normal passive range of motion  Extension: with pain    Joint Play     Right Elbow   Joints within functional limits are the humeroulnar joint, humeroradial joint and proximal radioulnar joint.     Strength/Myotome Testing     Right Elbow   Flexion: 4  Extension: 4  Forearm supination: 4  Forearm pronation: 4    Right Wrist/Hand   Wrist extension: 4  Wrist flexion: 4-  Radial deviation: 3+  Ulnar deviation: 4-    UT: (R) 3+/5 (L) 4-/5  MT: (T) 4-/5 (L) 4-/5  LT: (R) 3/5 (L) 3/5        Tests     Right Elbow   Positive active medial epicondylitis, moving valgus stress, passive medial epicondylitis and valgus stress at 30 degrees.   Negative Cozen's, elbow flexion, handshake, milking maneuver, pronator compression, radial tunnel and valgus stress at 0 degrees.       Diagnosis:  Right elbow pain   Precautions:  HTN   Comparable signs 1)  End range elbow extension   2) pain with wrist flexion  3)    Primary Impairments: 1)  Right wrist and elbow weakness  2) Right periscap strength deficits   Patient Goals Work at her computer without discomfort               Precautions: No past medical history on file. HTN    Exercises  - Median Nerve Flossing - Tray  - 1 x daily - 7 x weekly - 2 sets - 10 reps  - Ulnar Nerve Flossing  - 1 x daily - 7 x weekly - 2 sets - 10 reps  - Seated Cervical Retraction and Extension  - 1 x daily - 7 x weekly - 3 sets - 10 reps  - Neck Retraction  - 1 x daily - 7 x weekly - 3 sets - 10 reps      FOTO Completed On:     POC Expires Reeval for Medicare to be completed Unit Limit Auth Expiration Date PT/OT/STVisit Limit   3/3/2025 By visit MANAN HINKLE 12/31/2024 60    Completed on visit: MANAN                 TREATMENT DIARY  Auth Status 12/12       Approved               Visit # 59       Visits Remaining 1              Manuals 12/12  10/10 10/24 10/28 11/7 11/14 11/21   PROM           Tspine           Elbow mobilization           Taping Tricep                       Tricep IASTM           STM L extensor and flex stretch SMF  SMF SMF  SMF ea SMF ea SMF  SMF    IASTM along extensor mass SMF ext and flex mass  SMF ext mass SMF ext and flex mass SMF ext and flex mass SMF ext and flex mass SMF ext and flex mass SMF ext and flex mass   Active release of Extensor mass           L flexion and ext stretch SMF ea  SMF (wrist flex and ext stretch) SMF (wrist flex and ext stretch)  NV? SMF ea SMF ea SMF ea   Cupping           PA glides cervical            Cervical jt mobs                       Neuro Re-Ed           Rhythmic stab           Prone Ys, Ts, Is           Scap squeezes           Prone ext           Median nerve glides With side bend away: 2x10  NV NV With side bend away: 2x10 With side bend away: 2x10 With side bend away: 2x10 With side bend away: 2x10   Ulnar nerve glides           Radial N glides with head turn to the left With side bend  away: 2x10  NV NV With side bend away: 2x10 With side bend away: 2x10 With side bend away: 2x10 With side bend away: 2x10   TB B Scap Retraction            TB B ' S Ext           Seated thoracic ext           Standing Thoracic Extension with hands behind head with elbows sliding up wall           Bicep stretch using [employer]           Pec stretch in doorway           Elbow Flexion stretch c small towel           Seated thoracic extension stretch  (soccer ball)    NV? NV? Half foam: 2x10 Half foam: 2x10 NV?    Sideying Thoracic Rotation stretch           Cerivcal retraction           Cervical retraction to exntesion           Ther Ex           UBE   2 mins fwd/retro 2 mins fwd/retro 2 mins wd/retro 2 mins fwd/retro 2 mins fwd/retro 2 mins fwd/retro   Wrist ext/flex DB 4# 30 ea (rolling on finger tips during flex)  4# 30 ea (rolling on finger tips during flex) 4# 30 ea (rolling on finger tips during flex) 4# 30 ea (rolling on finger tips during flex) 4# 30 ea (rolling on finger tips during flex) 4# 30 ea (rolling on finger tips during flex) 4# 30 ea (rolling on finger tips during flex)              Diggiflex (all, individual) Red (all): 30; Yellow (individual): 30  Red (all): 30; Yellow (individual): 30 Red (all): 30; Yellow (individual): 30 Red (all): 30  Yellow (individual: 30 Red (all): 30; Yellow (individual): 30  Red (all): 30; Yellow individual: 30 Red (all): 30; Yellow (individual): 30    Metal gripper 50# 30   50# 30 50# 30 NV? 50# 30 50# 30   Pronation with supination during elbow flexion NV?  NV 6# 30 7# 30 NV? 7# 30  NV   MB grabs with supination to pronation and then reverse on table NV?                     Tricep ext           Rows, Ext            Wrist flex, ext stretch            strengthening            No moneys    NV? NV? NV? NV?                Supination stretch           Tricep stretch           Therastick wrist flexion and extension eccentric Green: 5 sec x 30 ea  Green: 5 sec x 30 ea NV? Green:  5 sec x 30 ea Green: 5 sec x 30 ea Green: 5 sec x 30 ea Green: 5 sec x 30 ea   Therastick shakes AP, M/L Green: 30 sec x 1 ea (straight arm)  Green: 40 sec x 1 ea (straight arm ea) NV? Green: 40 sec x 1 ea (straight arm ea) Green: 40 sec x 1 ea (straight arm ea)  Green: 40 sec x 1 ea (straight arm ea) Green: 40 sec x 1 ea (straight arm)              Supination and pronation 4# 30 ea  4# 30 ea  4# 30 ea 4# 30 ea 4# 30 ea 4# 30 ea 4# 30 ea   DB RD ecc 3# 30   3# 30 ea 3# 30 ea 3# 30 ea 3# 30 ea 3# 30 ea 3# 30 ea   Supination and pronation with Hammer+weight   NV? NV? NV?      Ther Activity            Dunaway carry with slight elevation of shoulders and scap retraction NV  NV? 10# 3 laps ea 10# 3 laps NV? 10# 3 laps ea 10# 3 laps ea   DB hold with towel            DB carrying with head of weight           Medicine Ball Hold Yellow MB NV?                      Gait Training                                   Modalities

## 2024-12-19 ENCOUNTER — APPOINTMENT (OUTPATIENT)
Dept: PHYSICAL THERAPY | Facility: CLINIC | Age: 59
End: 2024-12-19
Payer: COMMERCIAL

## 2024-12-26 ENCOUNTER — APPOINTMENT (OUTPATIENT)
Dept: PHYSICAL THERAPY | Facility: CLINIC | Age: 59
End: 2024-12-26
Payer: COMMERCIAL

## 2024-12-26 ENCOUNTER — OFFICE VISIT (OUTPATIENT)
Dept: PHYSICAL THERAPY | Facility: CLINIC | Age: 59
End: 2024-12-26
Payer: COMMERCIAL

## 2024-12-26 DIAGNOSIS — M25.521 RIGHT ELBOW PAIN: Primary | ICD-10-CM

## 2024-12-26 PROCEDURE — 97112 NEUROMUSCULAR REEDUCATION: CPT | Performed by: PHYSICAL THERAPIST

## 2024-12-26 PROCEDURE — 97140 MANUAL THERAPY 1/> REGIONS: CPT | Performed by: PHYSICAL THERAPIST

## 2024-12-26 PROCEDURE — 97110 THERAPEUTIC EXERCISES: CPT | Performed by: PHYSICAL THERAPIST

## 2024-12-26 NOTE — PROGRESS NOTES
Daily Note     Today's date: 2024  Patient name: Theresa Kim  : 1965  MRN: [id number]  Referring provider: Joselin Brumfield DO  Dx:   Encounter Diagnosis     ICD-10-CM    1. Right elbow pain  M25.521                      Subjective: Patient reports that she did a lot of mixing and having increased soreness in forearm.       Objective: See treatment diary below      Assessment: Tolerated treatment well; initiated POC with UBE for active warmup. Vcs provided on proper sequencing with exercises. MT performed after receiving consent from pt; she has increased muscle tone along extensor mass which improves with STM/IASTM. Concluded with nerve gliding. She was sore post session however improved tightness.   Patient demonstrated fatigue post treatment and would benefit from continued PT      Plan: Continue per plan of care.           Precautions: No past medical history on file. HTN    Exercises  - Median Nerve Flossing - Tray  - 1 x daily - 7 x weekly - 2 sets - 10 reps  - Ulnar Nerve Flossing  - 1 x daily - 7 x weekly - 2 sets - 10 reps  - Seated Cervical Retraction and Extension  - 1 x daily - 7 x weekly - 3 sets - 10 reps  - Neck Retraction  - 1 x daily - 7 x weekly - 3 sets - 10 reps      FOTO Completed On:     POC Expires Reeval for Medicare to be completed Unit Limit Auth Expiration Date PT/OT/STVisit Limit   3/3/2025 By visit MANAN HINKLE 2024 60    Completed on visit: MANAN                 TREATMENT DIARY  Auth Status       Approved               Visit # 59 60      Visits Remaining 1 0             Manuals 12/12 12/26   10/28 11/7 11/14 11/21   PROM           Tspine           Elbow mobilization           Taping Tricep                       Tricep IASTM           STM L extensor and flex stretch SMF SMF   SMF ea SMF ea SMF  SMF    IASTM along extensor mass SMF ext and flex mass SMF ext and flex mass (focus on extensor)   SMF ext and flex mass SMF ext and flex mass SMF ext and flex mass  SMF ext and flex mass   Active release of Extensor mass           L flexion and ext stretch SMF ea NV?   NV? SMF ea SMF ea SMF ea   Cupping           PA glides cervical            Cervical jt mobs                       Neuro Re-Ed           Rhythmic stab           Prone Ys, Ts, Is           Scap squeezes           Prone ext           Median nerve glides With side bend away: 2x10 With side bend away: 2x10   With side bend away: 2x10 With side bend away: 2x10 With side bend away: 2x10 With side bend away: 2x10   Ulnar nerve glides           Radial N glides with head turn to the left With side bend away: 2x10 With side bend away: 2x10   With side bend away: 2x10 With side bend away: 2x10 With side bend away: 2x10 With side bend away: 2x10   TB B Scap Retraction            TB B ' S Ext           Seated thoracic ext           Standing Thoracic Extension with hands behind head with elbows sliding up wall           Bicep stretch using [employer]           Pec stretch in doorway           Elbow Flexion stretch c small towel           Seated thoracic extension stretch  (soccer ball)      Half foam: 2x10 Half foam: 2x10 NV?    Sideying Thoracic Rotation stretch           Cerivcal retraction           Cervical retraction to exntesion           Ther Ex           UBE     2 mins wd/retro 2 mins fwd/retro 2 mins fwd/retro 2 mins fwd/retro   Wrist ext/flex DB 4# 30 ea (rolling on finger tips during flex) 4# 30 ea (rolling on finger tips during flex)   4# 30 ea (rolling on finger tips during flex) 4# 30 ea (rolling on finger tips during flex) 4# 30 ea (rolling on finger tips during flex) 4# 30 ea (rolling on finger tips during flex)              Diggiflex (all, individual) Red (all): 30; Yellow (individual): 30 Red (all): 30; Yellow (individual): 30    Red (all): 30  Yellow (individual: 30 Red (all): 30; Yellow (individual): 30  Red (all): 30; Yellow individual: 30 Red (all): 30; Yellow (individual): 30    Metal gripper 50# 30 50# 30    50# 30 NV? 50# 30 50# 30   Pronation with supination during elbow flexion NV? NV?   7# 30 NV? 7# 30  NV   MB grabs with supination to pronation and then reverse on table NV? NV?                    Tricep ext           Rows, Ext            Wrist flex, ext stretch            strengthening            No moneys      NV? NV?                Supination stretch           Tricep stretch           Therastick wrist flexion and extension eccentric Green: 5 sec x 30 ea Gren: 5 sec x 30 ea   Green: 5 sec x 30 ea Green: 5 sec x 30 ea Green: 5 sec x 30 ea Green: 5 sec x 30 ea   Therastick shakes AP, M/L Green: 30 sec x 1 ea (straight arm) Green: 30 sec x 1 ea (straight arm)    Green: 40 sec x 1 ea (straight arm ea) Green: 40 sec x 1 ea (straight arm ea)  Green: 40 sec x 1 ea (straight arm ea) Green: 40 sec x 1 ea (straight arm)              Supination and pronation 4# 30 ea 4# 30 ea   4# 30 ea 4# 30 ea 4# 30 ea 4# 30 ea   DB RD ecc 3# 30  3# 30   3# 30 ea 3# 30 ea 3# 30 ea 3# 30 ea   Supination and pronation with Hammer+weight     NV?      Ther Activity            Dunaway carry with slight elevation of shoulders and scap retraction NV NV?   10# 3 laps NV? 10# 3 laps ea 10# 3 laps ea   DB hold with towel            DB carrying with head of weight           Medicine Ball Hold Yellow MB NV? NV?                     Gait Training                                   Modalities

## 2025-01-02 ENCOUNTER — OFFICE VISIT (OUTPATIENT)
Dept: PHYSICAL THERAPY | Facility: CLINIC | Age: 60
End: 2025-01-02
Payer: COMMERCIAL

## 2025-01-02 DIAGNOSIS — M25.521 RIGHT ELBOW PAIN: Primary | ICD-10-CM

## 2025-01-02 PROCEDURE — 97112 NEUROMUSCULAR REEDUCATION: CPT | Performed by: PHYSICAL THERAPIST

## 2025-01-02 PROCEDURE — 97140 MANUAL THERAPY 1/> REGIONS: CPT | Performed by: PHYSICAL THERAPIST

## 2025-01-02 PROCEDURE — 97110 THERAPEUTIC EXERCISES: CPT | Performed by: PHYSICAL THERAPIST

## 2025-01-02 NOTE — PROGRESS NOTES
Daily Note     Today's date: 2025  Patient name: Theresa Kim  : 1965  MRN: [id number]  Referring provider: Joselin Brumfield DO  Dx:   Encounter Diagnosis     ICD-10-CM    1. Right elbow pain  M25.521                      Subjective: She reports that she was sore after last time but nothing she could not handle.       Objective: See treatment diary below      Assessment: Tolerated treatment well; initiated POC with UBE for active warmup. Vcs provided on proper sequencing with exercises; resumed bicep curl with pronation and supination. She denies having pain however fatigue during session. She continues to have increased tone along medial aspect of extensor mass which improves post STM/TPR. She reports having soreness post session.  Patient demonstrated fatigue post treatment and would benefit from continued PT      Plan: Continue per plan of care.      Precautions: No past medical history on file. HTN    Exercises  - Median Nerve Flossing - Tray  - 1 x daily - 7 x weekly - 2 sets - 10 reps  - Ulnar Nerve Flossing  - 1 x daily - 7 x weekly - 2 sets - 10 reps  - Seated Cervical Retraction and Extension  - 1 x daily - 7 x weekly - 3 sets - 10 reps  - Neck Retraction  - 1 x daily - 7 x weekly - 3 sets - 10 reps      FOTO Completed On:     POC Expires Reeval for Medicare to be completed Unit Limit Auth Expiration Date PT/OT/STVisit Limit   3/3/2025 By visit MANAN HINKLE 2025 60    Completed on visit: MANAN                 TREATMENT DIARY  Auth Status      Approved               Visit # 59 60 1     Visits Remaining 1 0 59            Manuals 12/12 12/26 1/2  10/28 11/7 11/14 11/21   PROM           Tspine           Elbow mobilization           Taping Tricep                       Tricep IASTM           STM L extensor and flex stretch SMF SMF SMF  SMF ea SMF ea SMF  SMF    IASTM along extensor mass SMF ext and flex mass SMF ext and flex mass (focus on extensor) SMF ea (focus on medial aspect  of extensor mass)  SMF ext and flex mass SMF ext and flex mass SMF ext and flex mass SMF ext and flex mass   Active release of Extensor mass           L flexion and ext stretch SMF ea NV? SMF ea  NV? SMF ea SMF ea SMF ea   Cupping           PA glides cervical            Cervical jt mobs                       Neuro Re-Ed           Rhythmic stab           Prone Ys, Ts, Is           Scap squeezes           Prone ext           Median nerve glides With side bend away: 2x10 With side bend away: 2x10 With side bend away: 2x10  With side bend away: 2x10 With side bend away: 2x10 With side bend away: 2x10 With side bend away: 2x10   Ulnar nerve glides           Radial N glides with head turn to the left With side bend away: 2x10 With side bend away: 2x10 With side bend away: 2x10  With side bend away: 2x10 With side bend away: 2x10 With side bend away: 2x10 With side bend away: 2x10   TB B Scap Retraction            TB B ' S Ext           Seated thoracic ext           Standing Thoracic Extension with hands behind head with elbows sliding up wall           Bicep stretch using Avalara           Pec stretch in doorway           Elbow Flexion stretch c small towel           Seated thoracic extension stretch  (soccer ball)      Half foam: 2x10 Half foam: 2x10 NV?    Sideying Thoracic Rotation stretch           Cerivcal retraction           Cervical retraction to exntesion           Ther Ex           UBE     2 mins wd/retro 2 mins fwd/retro 2 mins fwd/retro 2 mins fwd/retro   Wrist ext/flex DB 4# 30 ea (rolling on finger tips during flex) 4# 30 ea (rolling on finger tips during flex) 4# 30 ea (rolling finger tips during flex)  4# 30 ea (rolling on finger tips during flex) 4# 30 ea (rolling on finger tips during flex) 4# 30 ea (rolling on finger tips during flex) 4# 30 ea (rolling on finger tips during flex)              Diggiflex (all, individual) Red (all): 30; Yellow (individual): 30 Red (all): 30; Yellow (individual): 30  Red  (all): 30; Yellow (individual): 30  Red (all): 30  Yellow (individual: 30 Red (all): 30; Yellow (individual): 30  Red (all): 30; Yellow individual: 30 Red (all): 30; Yellow (individual): 30    Metal gripper 50# 30 50# 30   50# 30 NV? 50# 30 50# 30   Pronation with supination during elbow flexion NV? NV? 7# 20  7# 30 NV? 7# 30  NV   MB grabs with supination to pronation and then reverse on table NV? NV? NV?                   Tricep ext           Rows, Ext            Wrist flex, ext stretch            strengthening            No moneys      NV? NV?                Supination stretch           Tricep stretch           Therastick wrist flexion and extension eccentric Green: 5 sec x 30 ea Green: 5 sec x 30 ea Green 5 sec x 30 ea  Green: 5 sec x 30 ea Green: 5 sec x 30 ea Green: 5 sec x 30 ea Green: 5 sec x 30 ea   Therastick shakes AP, M/L Green: 30 sec x 1 ea (straight arm) Green: 30 sec x 1 ea (straight arm)  Green: 40 sec x 1 ea (straight)   Green: 40 sec x 1 ea (straight arm ea) Green: 40 sec x 1 ea (straight arm ea)  Green: 40 sec x 1 ea (straight arm ea) Green: 40 sec x 1 ea (straight arm)              Supination and pronation 4# 30 ea 4# 30 ea 4# 30 ea  4# 30 ea 4# 30 ea 4# 30 ea 4# 30 ea   DB RD ecc 3# 30  3# 30 3# 30   3# 30 ea 3# 30 ea 3# 30 ea 3# 30 ea   Supination and pronation with Hammer+weight     NV?      Ther Activity            Dunaway carry with slight elevation of shoulders and scap retraction NV NV? 9# 3 laps ea  10# 3 laps NV? 10# 3 laps ea 10# 3 laps ea   DB hold with towel            DB carrying with head of weight           Medicine Ball Hold Yellow MB NV? NV? NV?                    Gait Training                                   Modalities            MH

## 2025-01-09 ENCOUNTER — APPOINTMENT (OUTPATIENT)
Dept: PHYSICAL THERAPY | Facility: CLINIC | Age: 60
End: 2025-01-09
Payer: COMMERCIAL

## 2025-01-16 ENCOUNTER — OFFICE VISIT (OUTPATIENT)
Dept: PHYSICAL THERAPY | Facility: CLINIC | Age: 60
End: 2025-01-16
Payer: COMMERCIAL

## 2025-01-16 DIAGNOSIS — M25.521 RIGHT ELBOW PAIN: Primary | ICD-10-CM

## 2025-01-16 PROCEDURE — 97140 MANUAL THERAPY 1/> REGIONS: CPT | Performed by: PHYSICAL THERAPIST

## 2025-01-16 PROCEDURE — 97110 THERAPEUTIC EXERCISES: CPT | Performed by: PHYSICAL THERAPIST

## 2025-01-16 PROCEDURE — 97112 NEUROMUSCULAR REEDUCATION: CPT | Performed by: PHYSICAL THERAPIST

## 2025-01-16 NOTE — PROGRESS NOTES
Daily Note     Today's date: 2025  Patient name: Theresa Kim  : 1965  MRN: [id number]  Referring provider: Joselin Brumfield DO  Dx:   Encounter Diagnosis     ICD-10-CM    1. Right elbow pain  M25.521                      Subjective: She reports that she is stiff/tight.       Objective: See treatment diary below      Assessment: Tolerated treatment well; initiated POC with UBE for active warm-up. Vcs provided on proper sequencing with exercises; added MB holds. MT performed after receiving consent from pt; STM/TPR along medial and lateral aspect; she has increased tone tonight which improves post MT.  Patient demonstrated fatigue post treatment and would benefit from continued PT      Plan: Continue per plan of care.      Precautions: No past medical history on file. HTN    Exercises  - Median Nerve Flossing - Tray  - 1 x daily - 7 x weekly - 2 sets - 10 reps  - Ulnar Nerve Flossing  - 1 x daily - 7 x weekly - 2 sets - 10 reps  - Seated Cervical Retraction and Extension  - 1 x daily - 7 x weekly - 3 sets - 10 reps  - Neck Retraction  - 1 x daily - 7 x weekly - 3 sets - 10 reps      FOTO Completed On:     POC Expires Reeval for Medicare to be completed Unit Limit Auth Expiration Date PT/OT/STVisit Limit   3/3/2025 By visit MANAN HINKLE 2025 60    Completed on visit: MANAN                 TREATMENT DIARY  Auth Status     Approved               Visit # 59 60 1 2    Visits Remaining 1 0 59 58           Manuals    PROM           Tspine           Elbow mobilization           Taping Tricep                       Tricep IASTM           STM L extensor and flex stretch SMF SMF SMF SMF    SMF    IASTM along extensor mass SMF ext and flex mass SMF ext and flex mass (focus on extensor) SMF ea (focus on medial aspect of extensor mass) SMF ea (focus on medial aspect of aspect extension mass)     SMF ext and flex mass   Active release of Extensor mass           L  flexion and ext stretch SMF ea NV? SMF ea SMF ea    SMF ea   Cupping           PA glides cervical            Cervical jt mobs                       Neuro Re-Ed           Rhythmic stab           Prone Ys, Ts, Is           Scap squeezes           Prone ext           Median nerve glides With side bend away: 2x10 With side bend away: 2x10 With side bend away: 2x10 With side bend away: 2x10    With side bend away: 2x10   Ulnar nerve glides           Radial N glides with head turn to the left With side bend away: 2x10 With side bend away: 2x10 With side bend away: 2x10 With side bend away: 2x10    With side bend away: 2x10   TB B Scap Retraction            TB B ' S Ext           Seated thoracic ext           Standing Thoracic Extension with hands behind head with elbows sliding up wall           Bicep stretch using [employer]           Pec stretch in doorway           Elbow Flexion stretch c small towel           Seated thoracic extension stretch  (soccer ball)            Sideying Thoracic Rotation stretch           Cerivcal retraction           Cervical retraction to exntesion           Ther Ex           UBE        2 mins fwd/retro   Wrist ext/flex DB 4# 30 ea (rolling on finger tips during flex) 4# 30 ea (rolling on finger tips during flex) 4# 30 ea (rolling finger tips during flex) 4# 30 ea (rolling finger tips during flex)    4# 30 ea (rolling on finger tips during flex)              Diggiflex (all, individual) Red (all): 30; Yellow (individual): 30 Red (all): 30; Yellow (individual): 30  Red (all): 30; Yellow (individual): 30 Red (all): 30; Yellow (individual): 30    Red (all): 30; Yellow (individual): 30    Metal gripper 50# 30 50# 30      50# 30   Pronation with supination during elbow flexion NV? NV? 7# 20 6# 30    NV   MB grabs with supination to pronation and then reverse on table NV? NV? NV?                   Tricep ext           Rows, Ext            Wrist flex, ext stretch            strengthening             No moneys                       Supination stretch           Tricep stretch           Therastick wrist flexion and extension eccentric Green: 5 sec x 30 ea Green: 5 sec x 30 ea Green 5 sec x 30 ea Green: 5 sec x 30 ea    Green: 5 sec x 30 ea   Therastick shakes AP, M/L Green: 30 sec x 1 ea (straight arm) Green: 30 sec x 1 ea (straight arm)  Green: 40 sec x 1 ea (straight)  Green: 45 sec x 1 (straight); 30 sec x 1 (straight)     Green: 40 sec x 1 ea (straight arm)              Supination and pronation 4# 30 ea 4# 30 ea 4# 30 ea 4# 30 ea    4# 30 ea   DB RD ecc 3# 30  3# 30 3# 30  3# 30     3# 30 ea   Supination and pronation with Hammer+weight           Ther Activity            Dunaway carry with slight elevation of shoulders and scap retraction NV NV? 9# 3 laps ea 9# 3 laps ea    10# 3 laps ea   DB hold with towel            DB carrying with head of weight           Medicine Ball Hold Yellow MB NV? NV? NV? Yellow: 30 sec x 3                    Gait Training                                   Modalities            MH

## 2025-01-23 ENCOUNTER — OFFICE VISIT (OUTPATIENT)
Dept: PHYSICAL THERAPY | Facility: CLINIC | Age: 60
End: 2025-01-23
Payer: COMMERCIAL

## 2025-01-23 DIAGNOSIS — M25.521 RIGHT ELBOW PAIN: Primary | ICD-10-CM

## 2025-01-23 PROCEDURE — 97140 MANUAL THERAPY 1/> REGIONS: CPT | Performed by: PHYSICAL THERAPIST

## 2025-01-23 PROCEDURE — 97112 NEUROMUSCULAR REEDUCATION: CPT | Performed by: PHYSICAL THERAPIST

## 2025-01-23 NOTE — PROGRESS NOTES
Daily Note     Today's date: 2025  Patient name: Theresa Kim  : 1965  MRN: [id number]  Referring provider: Joselin Brumfield DO  Dx:   Encounter Diagnosis     ICD-10-CM    1. Right elbow pain  M25.521                      Subjective: She reports that she still has difficulty gripping objects and carrying something that is heavy.       Objective: See treatment diary below      Assessment: Tolerated treatment well; initiated POC with UBE for active warmup. Vcs provided on proper sequencing with exercises; she continues to be challenged with MB hold. MT performed after receiving consent from pt; she continues to have increased tone along extensor muscle belly mass. Concluded with nerve glides and static stretching.    Patient demonstrated fatigue post treatment and would benefit from continued PT      Plan: Continue per plan of care.      Precautions: No past medical history on file. HTN    Exercises  - Median Nerve Flossing - Tray  - 1 x daily - 7 x weekly - 2 sets - 10 reps  - Ulnar Nerve Flossing  - 1 x daily - 7 x weekly - 2 sets - 10 reps  - Seated Cervical Retraction and Extension  - 1 x daily - 7 x weekly - 3 sets - 10 reps  - Neck Retraction  - 1 x daily - 7 x weekly - 3 sets - 10 reps      FOTO Completed On:     POC Expires Reeval for Medicare to be completed Unit Limit Auth Expiration Date PT/OT/STVisit Limit   3/3/2025 By visit NA NA 2025 60    Completed on visit: MANAN                 TREATMENT DIARY  Auth Status    Approved               Visit # 59 60 1 2 3   Visits Remaining 1 0 59 58 57          Manuals    PROM        Tspine        Elbow mobilization        Taping Tricep                 Tricep IASTM        STM L extensor and flex stretch SMF SMF SMF SMF SMF   IASTM along extensor mass SMF ext and flex mass SMF ext and flex mass (focus on extensor) SMF ea (focus on medial aspect of extensor mass) SMF ea (focus on medial aspect of  aspect extension mass)  SMF ea (focus on medial aspect of extension mass)   Active release of Extensor mass     10x with wrist extension   L flexion and ext stretch SMF ea NV? SMF ea SMF ea SMF ea   Cupping        PA glides cervical         Cervical jt mobs                 Neuro Re-Ed        Rhythmic stab        Prone Ys, Ts, Is        Scap squeezes        Prone ext        Median nerve glides With side bend away: 2x10 With side bend away: 2x10 With side bend away: 2x10 With side bend away: 2x10 With side bend away: 2x10   Ulnar nerve glides     NV?   Radial N glides with head turn to the left With side bend away: 2x10 With side bend away: 2x10 With side bend away: 2x10 With side bend away: 2x10 With side bend away: 2x10   TB B Scap Retraction         TB B ' S Ext        Seated thoracic ext     NV?   Standing Thoracic Extension with hands behind head with elbows sliding up wall        Bicep stretch using biodex        Pec stretch in doorway     NV?   Elbow Flexion stretch c small towel        Seated thoracic extension stretch  (soccer ball)         Sideying Thoracic Rotation stretch        Cerivcal retraction        Cervical retraction to exntesion        Ther Ex        UBE     2 mins ea   (fwd/retro)   Wrist ext/flex DB 4# 30 ea (rolling on finger tips during flex) 4# 30 ea (rolling on finger tips during flex) 4# 30 ea (rolling finger tips during flex) 4# 30 ea (rolling finger tips during flex) 4# 30 ea (rolling fingers tips during flex)           Diggiflex (all, individual) Red (all): 30; Yellow (individual): 30 Red (all): 30; Yellow (individual): 30  Red (all): 30; Yellow (individual): 30 Red (all): 30; Yellow (individual): 30 Red (all): 30; Yellow (individual): 30   Diggiweb ext     NV?   Metal gripper 50# 30 50# 30   50# 30   Pronation with supination during elbow flexion NV? NV? 7# 20 6# 30 6# 30   MB grabs with supination to pronation and then reverse on table NV? NV? NV?  NV?           Tricep ext        Rows,  Ext         Wrist flex, ext stretch     20 sec x 3 ea    strengthening         No moneys                 Supination stretch        Tricep stretch        Therastick wrist flexion and extension eccentric Green: 5 sec x 30 ea Green: 5 sec x 30 ea Green 5 sec x 30 ea Green: 5 sec x 30 ea Green: 5 sec x 30 ea   Therastick shakes AP, M/L Green: 30 sec x 1 ea (straight arm) Green: 30 sec x 1 ea (straight arm)  Green: 40 sec x 1 ea (straight)  Green: 45 sec x 1 (straight); 30 sec x 1 (straight)  Green 45 sec x 1 (straight arm A/P); green 40 sec x 1 (M/L)           Supination and pronation 4# 30 ea 4# 30 ea 4# 30 ea 4# 30 ea 4# 30 ea   DB RD ecc 3# 30  3# 30 3# 30  3# 30  3# 30    Supination and pronation with Hammer+weight        Ther Activity         Dunaway carry with slight elevation of shoulders and scap retraction NV NV? 9# 3 laps ea 9# 3 laps ea 9# 3 laps ea   DB hold with towel         DB carrying with head of weight        Medicine Ball Hold Yellow MB NV? NV? NV? Yellow: 30 sec x 3  Yellow: 30 sec x 3             Gait Training                          Modalities         MH

## 2025-01-30 ENCOUNTER — OFFICE VISIT (OUTPATIENT)
Dept: PHYSICAL THERAPY | Facility: CLINIC | Age: 60
End: 2025-01-30
Payer: COMMERCIAL

## 2025-01-30 DIAGNOSIS — M25.521 RIGHT ELBOW PAIN: Primary | ICD-10-CM

## 2025-01-30 PROCEDURE — 97140 MANUAL THERAPY 1/> REGIONS: CPT | Performed by: PHYSICAL THERAPIST

## 2025-01-30 PROCEDURE — 97112 NEUROMUSCULAR REEDUCATION: CPT | Performed by: PHYSICAL THERAPIST

## 2025-01-30 PROCEDURE — 97110 THERAPEUTIC EXERCISES: CPT | Performed by: PHYSICAL THERAPIST

## 2025-01-30 NOTE — PROGRESS NOTES
Daily Note     Today's date: 2025  Patient name: Theresa Kim  : 1965  MRN: [id number]  Referring provider: Joselin Brumfield DO  Dx:   Encounter Diagnosis     ICD-10-CM    1. Right elbow pain  M25.521                      Subjective: Patient reports that she continues to have discomfort in her arm however improves with manual.       Objective: See treatment diary below      Assessment: Tolerated treatment well; initiated POC with diggiflex f/b UBE for active warmup. Vcs provided on proper sequencing with exercises; added MB turns and diggiweb extension. She reports fatigue with exercises. She did report having pain with MB rotations however pain lessened with joint mobilizations grade II-IV. MT performed after receiving consent from pt; she continues to have increased tone to extensor mass which improves post STM/TPR.   Patient demonstrated fatigue post treatment and would benefit from continued PT      Plan: Continue per plan of care.      Precautions: No past medical history on file. HTN    Exercises  - Median Nerve Flossing - Tray  - 1 x daily - 7 x weekly - 2 sets - 10 reps  - Ulnar Nerve Flossing  - 1 x daily - 7 x weekly - 2 sets - 10 reps  - Seated Cervical Retraction and Extension  - 1 x daily - 7 x weekly - 3 sets - 10 reps  - Neck Retraction  - 1 x daily - 7 x weekly - 3 sets - 10 reps      FOTO Completed On:     POC Expires Reeval for Medicare to be completed Unit Limit Auth Expiration Date PT/OT/STVisit Limit   3/3/2025 By visit MANAN HINKLE 2025 60    Completed on visit: MANAN                 TREATMENT DIARY  Auth Status    Approved               Visit # 4  1 2 3   Visits Remaining 56  59 58 57          Manuals    PROM        Tspine        Elbow mobilization        Taping Tricep                 Tricep IASTM        STM L extensor and flex stretch SMF  SMF SMF SMF   IASTM along extensor mass SMF ea (fous on medial aspect of extension mass)  SMF ea  (focus on medial aspect of extensor mass) SMF ea (focus on medial aspect of aspect extension mass)  SMF ea (focus on medial aspect of extension mass)   Active release of Extensor mass 10x with wrist extension    10x with wrist extension   L flexion and ext stretch   SMF ea SMF ea SMF ea   Cupping        PA glides cervical         Cervical jt mobs                 Neuro Re-Ed        Rhythmic stab        Prone Ys, Ts, Is        Scap squeezes        Prone ext        Median nerve glides With side bend away: 2x10  With side bend away: 2x10 With side bend away: 2x10 With side bend away: 2x10   Ulnar nerve glides NV?    NV?   Radial N glides with head turn to the left With side bend away: 2x10  With side bend away: 2x10 With side bend away: 2x10 With side bend away: 2x10   TB B Scap Retraction         TB B ' S Ext        Seated thoracic ext NV?    NV?   Standing Thoracic Extension with hands behind head with elbows sliding up wall        Bicep stretch using biodex        Pec stretch in doorway 20 sec x 3 ea     NV?   Elbow Flexion stretch c small towel        Seated thoracic extension stretch  (soccer ball)         Sideying Thoracic Rotation stretch        Cerivcal retraction        Cervical retraction to exntesion        Ther Ex        UBE 2 mins ea (fwd/retro)    2 mins ea   (fwd/retro)   Wrist ext/flex DB 4# 30 ea (rolling fingers tips during flex)  4# 30 ea (rolling finger tips during flex) 4# 30 ea (rolling finger tips during flex) 4# 30 ea (rolling fingers tips during flex)           Diggiflex (all, individual) Red (all): 30; Yellow (individual): 30   Red (all): 30; Yellow (individual): 30 Red (all): 30; Yellow (individual): 30 Red (all): 30; Yellow (individual): 30   Diggiweb ext Yellow: 2 sec x 10    NV?   Metal gripper 50# 30    50# 30   Pronation with supination during elbow flexion 6# 30  7# 20 6# 30 6# 30   MB grabs with supination to pronation and then reverse on table Yellow MB: 5 ea  NV?  NV?           Tricep  ext        Rows, Ext         Wrist flex, ext stretch 20 sec x 3 ea    20 sec x 3 ea    strengthening         No moneys                 Supination stretch        Tricep stretch        Therastick wrist flexion and extension eccentric Green: 5 sec x 30 ea  Green 5 sec x 30 ea Green: 5 sec x 30 ea Green: 5 sec x 30 ea   Therastick shakes AP, M/L Green: 45 sec x 1 (straight arm A/P); Green 45 sec x 1 (M/L)   Green: 40 sec x 1 ea (straight)  Green: 45 sec x 1 (straight); 30 sec x 1 (straight)  Green 45 sec x 1 (straight arm A/P); green 40 sec x 1 (M/L)           Supination and pronation 4# 30 ea  4# 30 ea 4# 30 ea 4# 30 ea   DB RD ecc 3# 30   3# 30  3# 30  3# 30    Supination and pronation with Hammer+weight        Ther Activity         Dunaway carry with slight elevation of shoulders and scap retraction 9# 3 laps ea   9# 3 laps ea 9# 3 laps ea 9# 3 laps ea   DB hold with towel         DB carrying with head of weight        Medicine Ball Hold Yellow: 30 sec x 3   NV? Yellow: 30 sec x 3  Yellow: 30 sec x 3             Gait Training                          Modalities         MH

## 2025-02-06 ENCOUNTER — OFFICE VISIT (OUTPATIENT)
Dept: PHYSICAL THERAPY | Facility: CLINIC | Age: 60
End: 2025-02-06
Payer: COMMERCIAL

## 2025-02-06 DIAGNOSIS — M25.521 RIGHT ELBOW PAIN: Primary | ICD-10-CM

## 2025-02-06 PROCEDURE — 97110 THERAPEUTIC EXERCISES: CPT | Performed by: PHYSICAL THERAPIST

## 2025-02-06 PROCEDURE — 97112 NEUROMUSCULAR REEDUCATION: CPT | Performed by: PHYSICAL THERAPIST

## 2025-02-06 PROCEDURE — 97140 MANUAL THERAPY 1/> REGIONS: CPT | Performed by: PHYSICAL THERAPIST

## 2025-02-06 NOTE — PROGRESS NOTES
Daily Note     Today's date: 2025  Patient name: Theresa Kim  : 1965  MRN: [id number]  Referring provider: Joselin Brumfield DO  Dx:   Encounter Diagnosis     ICD-10-CM    1. Right elbow pain  M25.521                      Subjective: She always feels better after being worked on in therapy.       Objective: See treatment diary below      Assessment: Tolerated treatment well; initiated POC with UBE forward and retrograde. Vcs provided on proper sequencing with exercises; she fatigues with MB hold tonight. She has less pain with MB turns in her forearm and does not requires as many joint mobilizations to reduce her sxs. MT performed after receiving consent; increase tone noted more proximally which improves with TPR.   Patient demonstrated fatigue post treatment and would benefit from continued PT      Plan: Continue per plan of care.      Precautions: No past medical history on file. HTN    Exercises  - Median Nerve Flossing - Tray  - 1 x daily - 7 x weekly - 2 sets - 10 reps  - Ulnar Nerve Flossing  - 1 x daily - 7 x weekly - 2 sets - 10 reps  - Seated Cervical Retraction and Extension  - 1 x daily - 7 x weekly - 3 sets - 10 reps  - Neck Retraction  - 1 x daily - 7 x weekly - 3 sets - 10 reps      FOTO Completed On:     POC Expires Reeval for Medicare to be completed Unit Limit Auth Expiration Date PT/OT/STVisit Limit   3/3/2025 By visit NA NA 2025 60    Completed on visit: MANAN                 TREATMENT DIARY  Auth Status    Approved               Visit # 4 3  2 3   Visits Remaining 56 [phone]  [phone]          Manuals    PROM        Tspine        Elbow mobilization        Taping Tricep                 Tricep IASTM        STM L extensor and flex stretch SMF SMF TPR/STM focused medial and laterally  SMF SMF   IASTM along extensor mass SMF ea (fous on medial aspect of extension mass)   SMF ea (focus on medial aspect of aspect extension mass)  SMF ea (focus on  medial aspect of extension mass)   Active release of Extensor mass 10x with wrist extension    10x with wrist extension   L flexion and ext stretch    SMF ea SMF ea   Cupping        PA glides cervical         Cervical jt mobs                 Neuro Re-Ed        Rhythmic stab        Prone Ys, Ts, Is        Scap squeezes        Prone ext        Median nerve glides With side bend away: 2x10 With side bend away: 2x10  With side bend away: 2x10 With side bend away: 2x10   Ulnar nerve glides NV?    NV?   Radial N glides with head turn to the left With side bend away: 2x10 With side bend away: 2x10  With side bend away: 2x10 With side bend away: 2x10   TB B Scap Retraction         TB B ' S Ext        Seated thoracic ext NV?    NV?   Standing Thoracic Extension with hands behind head with elbows sliding up wall        Bicep stretch using [employer]        Pec stretch in doorway 20 sec x 3 ea     NV?   Elbow Flexion stretch c small towel        Seated thoracic extension stretch  (soccer ball)         Sideying Thoracic Rotation stretch        Cerivcal retraction        Cervical retraction to exntesion        Ther Ex        UBE 2 mins ea (fwd/retro) 2 mins ea (fwd/retro)   2 mins ea   (fwd/retro)   Wrist ext/flex DB 4# 30 ea (rolling fingers tips during flex) 4# 30 ea (rolling finger tips during flex)   4# 30 ea (rolling finger tips during flex) 4# 30 ea (rolling fingers tips during flex)           Diggiflex (all, individual) Red (all): 30; Yellow (individual): 30  Red (all): 30; Yellow (individual): 30  Red (all): 30; Yellow (individual): 30 Red (all): 30; Yellow (individual): 30   Diggiweb ext Yellow: 2 sec x 10 Yellow: 2 sec x 15   NV?   Metal gripper 50# 30 50# 30   50# 30   Pronation with supination during elbow flexion 6# 30 6# 30  6# 30 6# 30   MB grabs with supination to pronation and then reverse on table Yellow MB: 5 ea Yellow MB: 10 ea   NV?           Tricep ext        Rows, Ext         Wrist flex, ext stretch 20 sec x 3  ea NV   20 sec x 3 ea    strengthening         No moneys                 Supination stretch        Tricep stretch        Therastick wrist flexion and extension eccentric Green: 5 sec x 30 ea Green: 5 sec x 30 ea  Green: 5 sec x 30 ea Green: 5 sec x 30 ea   Therastick shakes AP, M/L Green: 45 sec x 1 (straight arm A/P); Green 45 sec x 1 (M/L)  Green: 45 sec x 1 (straight arm A/P); Green: 45 sec x 1 (M/L)   Green: 45 sec x 1 (straight); 30 sec x 1 (straight)  Green 45 sec x 1 (straight arm A/P); green 40 sec x 1 (M/L)           Supination and pronation 4# 30 ea 4# 30 ea  4# 30 ea 4# 30 ea   DB RD ecc 3# 30  3# 30  3# 30  3# 30    Supination and pronation with Hammer+weight        Ther Activity         Dunaway carry with slight elevation of shoulders and scap retraction 9# 3 laps ea  9# 3 laps ea  9# 3 laps ea 9# 3 laps ea   DB hold with towel         DB carrying with head of weight        Medicine Ball Hold Yellow: 30 sec x 3  Yellow: 30 sec x 3 ea  Yellow: 30 sec x 3  Yellow: 30 sec x 3             Gait Training                          Modalities         MH

## 2025-02-13 ENCOUNTER — APPOINTMENT (OUTPATIENT)
Dept: PHYSICAL THERAPY | Facility: CLINIC | Age: 60
End: 2025-02-13
Payer: COMMERCIAL

## 2025-02-20 ENCOUNTER — APPOINTMENT (OUTPATIENT)
Dept: PHYSICAL THERAPY | Facility: CLINIC | Age: 60
End: 2025-02-20
Payer: COMMERCIAL

## 2025-02-27 ENCOUNTER — OFFICE VISIT (OUTPATIENT)
Dept: PHYSICAL THERAPY | Facility: CLINIC | Age: 60
End: 2025-02-27
Payer: COMMERCIAL

## 2025-02-27 DIAGNOSIS — M25.521 RIGHT ELBOW PAIN: Primary | ICD-10-CM

## 2025-02-27 PROCEDURE — 97112 NEUROMUSCULAR REEDUCATION: CPT | Performed by: PHYSICAL THERAPIST

## 2025-02-27 PROCEDURE — 97110 THERAPEUTIC EXERCISES: CPT | Performed by: PHYSICAL THERAPIST

## 2025-02-27 PROCEDURE — 97140 MANUAL THERAPY 1/> REGIONS: CPT | Performed by: PHYSICAL THERAPIST

## 2025-02-27 NOTE — LETTER
2025    Joselin Brumfield DO  [address] [facility] [address]    Patient: Theresa Kim   YOB: 1965   Date of Visit: 2025   No diagnosis found.    Dear Dr. Brumfield:    Thank you for your recent referral of Theresa Kim. Please review the attached evaluation summary from Theresa's recent visit.     Please verify that you agree with the plan of care by signing the attached order.     If you have any questions or concerns, please do not hesitate to call.     I sincerely appreciate the opportunity to share in the care of one of your patients and hope to have another opportunity to work with you in the near future.       Sincerely,    Kassandra Mane, PT      Referring Provider:      I certify that I have read the below Plan of Care and certify the need for these services furnished under this plan of treatment while under my care.                    Joselin Brumfield DO  [address] [facility] [address]  Via Fax: [phone]            PT Evaluation     Today's date: 2024  Patient name: Theresa Kim  : 1965  MRN: [id number]  Referring provider: Joselin Brumfield DO  Dx:   Encounter Diagnosis     ICD-10-CM    1. Right elbow pain  M25.521               Assessment  Assessment details: Theresa is a pleasant 57 yo female presenting to OP PT secondary to Right elbow pain with insidious onset 1 year ago. She denies DALLIN, and reports no numbness/tingling. She reports having improvements with her strength in being able to hold a watering can approximately 2 gallons when she was not able to do prior.  She also has been busy with work and unable to attend therapy thus indicating continued benefits from skilled OP PT.   Overall, she reports having at least a 75% improvement since I.E. Pt presents continued but improving wrist and elbow strength deficits, periscap strength deficits, and pain at end range elbow/flex ext. Pain is increased along  triceps with palmation and increase posterior medial elbow pain with valgus stress test. Pt would benefit from skilled PT to address stated deficits and improve tolerance to daily activiites and improve maximal function.  Impairments: impaired physical strength and pain with function    Symptom irritability: moderateUnderstanding of Dx/Px/POC: good   Prognosis: good    Goals  Pt will demonstrate [place] with progressive home exercise program to assist with PT carry over and prevent recurrence of symptoms in the future - Goal met  Pt will demonstrate 20% improvement in FOTO score to increase tolerance to functional return. - Progressing   Pt will demonstrate 20 deg improvement in shoulder ROM to increase tolerance to reaching overhead, behind neck, and behind back to increase ease with ADLs such dressing/bathing - Progressing   Pt will demonstrate 4+/5 in UE strength to allow for improved tolerance to lifting items overhead. - Progressing   Pt will demonstrate 4+/5 in periscap strength to improve posture in sitting. - Progressing       Plan  Patient would benefit from: PT eval and skilled physical therapy  Planned modality interventions: TENS, manual electrical stimulation, low level laser therapy, thermotherapy: hydrocollator packs and cryotherapy  Planned therapy interventions: IASTM, joint mobilization, manual therapy, massage, nerve gliding, patient education, neuromuscular re-education, stretching, strengthening, therapeutic activities and therapeutic exercise  Frequency: 2x week  Plan of Care beginning date: 2/27/2025  Plan of Care expiration date: 5/22/2025  Treatment plan discussed with: patient        Subjective Evaluation    History of Present Illness    RE: 2/27/2025: Patient continues to notice when she has not been to therapy with increased sxs in her forearm.     RE: 12/12/24: Patient has not been able to attend as much therapy due to visit limit as well as traveling for work. She has noticed a  regression with increased sxs. She also recently fell while away and landed on her R side. She reports feeling more tight today.     RE: 2024: Patient reports overall sxs have improved however recent pain noted with riding her Ebike.     RE: 24: Patient reports that she continues to have soreness in her arm and tightness especially when she overworks her arm such as being in the garden. Overall she does report that she has improved in her strength.       RE 24: Patient was away on vacation for two weeks and reports that her sxs did improve. She did keep up with her nerve glide exercises. The pain has returned since returning to work and using the mouse. She has been at a constant 7/10 pain level this week.     Mechanism of injury: Theresa is a 59 yo female presenting to OP PT secondary to Right elbow pain with onset 1 year ago. Pt reports sx are aggravated with working on computer using mouse and with maintaining elbow in end range positions. She reports weakness with carrying items, working overhead, and completing yard work. She reports some relief with Advil, massage, and compression stretch.          Recurrent probem    Quality of life: good    Patient Goals  Patient goals for therapy: increased strength  Patient goal: Stronger and less pain;  Pain  Current pain ratin  At best pain ratin  At worst pain ratin  Location: Elbow  Quality: dull ache  Aggravating factors: lifting, sitting, standing and stair climbing, opening jars    Social Support  Stairs in house: yes   Lives in: multiple-level home  Lives with: significant other    Employment status: working  Hand dominance: right      Diagnostic Tests  X-ray: normal  Treatments  No previous or current treatments        Objective     Observations     Right Elbow   Negative for edema, effusion and incisions.     Palpation     Right   Tenderness of the triceps and wrist flexors.     Active Range of Motion     Right Elbow   Normal active range  of motion  Elbow hyperextension  Extension: with pain    CROM:  Flexion: TBA  Extension: TBA  Rotation: (R) TBA (L) TBA  Lat: (R) TBA (L) TBA    Passive Range of Motion     Right Elbow   Normal passive range of motion  Extension: with pain    Joint Play     Right Elbow   Joints within functional limits are the humeroulnar joint, humeroradial joint and proximal radioulnar joint.     Strength/Myotome Testing     Right Elbow   Flexion: 4  Extension: 4  Forearm supination: 4  Forearm pronation: 4    Right Wrist/Hand   Wrist extension: 4  Wrist flexion: 4-  Radial deviation: 3+  Ulnar deviation: 4-    UT: (R) 3+/5 (L) 4-/5  MT: (T) 4-/5 (L) 4-/5  LT: (R) 3/5 (L) 3/5        Tests     Right Elbow   Positive active medial epicondylitis, moving valgus stress, passive medial epicondylitis and valgus stress at 30 degrees.   Negative Cozen's, elbow flexion, handshake, milking maneuver, pronator compression, radial tunnel and valgus stress at 0 degrees.       Diagnosis:  Right elbow pain   Precautions:  HTN   Comparable signs 1) End range elbow extension   2) pain with wrist flexion  3)    Primary Impairments: 1)  Right wrist and elbow weakness  2) Right periscap strength deficits   Patient Goals Work at her computer without discomfort            Precautions: No past medical history on file. HTN    Exercises  - Median Nerve Flossing - Tray  - 1 x daily - 7 x weekly - 2 sets - 10 reps  - Ulnar Nerve Flossing  - 1 x daily - 7 x weekly - 2 sets - 10 reps  - Seated Cervical Retraction and Extension  - 1 x daily - 7 x weekly - 3 sets - 10 reps  - Neck Retraction  - 1 x daily - 7 x weekly - 3 sets - 10 reps      FOTO Completed On:     POC Expires Reeval for Medicare to be completed Unit Limit Auth Expiration Date PT/OT/STVisit Limit   3/3/2025 By visit MANAN HINKLE 12/31/2025 60    Completed on visit: MANAN                 TREATMENT DIARY  Auth Status 1/30 2/6 2/27     Approved               Visit # 4 3 6     Visits Remaining 56 [phone] 54             Manuals 1/30 2/6 2/27 1/23   PROM        Tspine        Elbow mobilization        Taping Tricep                 Tricep IASTM        STM L extensor and flex stretch SMF SMF TPR/STM focused medial and laterally   SMF   IASTM along extensor mass SMF ea (fous on medial aspect of extension mass)    SMF ea (focus on medial aspect of extension mass)   Active release of Extensor mass 10x with wrist extension    10x with wrist extension   L flexion and ext stretch     SMF ea   Cupping        PA glides cervical         Cervical jt mobs                 Neuro Re-Ed        Rhythmic stab        Prone Ys, Ts, Is        Scap squeezes        Prone ext        Median nerve glides With side bend away: 2x10 With side bend away: 2x10 With side bend away: 2x10  With side bend away: 2x10   Ulnar nerve glides NV?    NV?   Radial N glides with head turn to the left With side bend away: 2x10 With side bend away: 2x10 With side bend away: 2x10  With side bend away: 2x10   TB B Scap Retraction         TB B ' S Ext        Seated thoracic ext NV?    NV?   Standing Thoracic Extension with hands behind head with elbows sliding up wall        Bicep stretch using [employer]        Pec stretch in doorway 20 sec x 3 ea     NV?   Elbow Flexion stretch c small towel        Seated thoracic extension stretch  (soccer ball)         Sideying Thoracic Rotation stretch        Cerivcal retraction        Cervical retraction to exntesion        Ther Ex        UBE 2 mins ea (fwd/retro) 2 mins ea (fwd/retro) 2 mins ea (fwd/retro)  2 mins ea   (fwd/retro)   Wrist ext/flex DB 4# 30 ea (rolling fingers tips during flex) 4# 30 ea (rolling finger tips during flex)  4# 30 ea (rolling finger tips during aruna)  4# 30 ea (rolling fingers tips during flex)           Diggiflex (all, individual) Red (all): 30; Yellow (individual): 30  Red (all): 30; Yellow (individual): 30 Red (all): 30; Yellow (individual): 30  Red (all): 30; Yellow (individual): 30   Diggiweb ext Yellow: 2  sec x 10 Yellow: 2 sec x 15 Yellow: 2 sec x 15  NV?   Metal gripper 50# 30 50# 30 50# 30  50# 30   Pronation with supination during elbow flexion 6# 30 6# 30 6# 30  6# 30   MB grabs with supination to pronation and then reverse on table Yellow MB: 5 ea Yellow MB: 10 ea NV?  NV?           Tricep ext        Rows, Ext         Wrist flex, ext stretch 20 sec x 3 ea NV 20 sec x 3 ea  20 sec x 3 ea    strengthening         No moneys                 Supination stretch        Tricep stretch        Therastick wrist flexion and extension eccentric Green: 5 sec x 30 ea Green: 5 sec x 30 ea Green: 5 sec x 30 ea  Green: 5 sec x 30 ea   Therastick shakes AP, M/L Green: 45 sec x 1 (straight arm A/P); Green 45 sec x 1 (M/L)  Green: 45 sec x 1 (straight arm A/P); Green: 45 sec x 1 (M/L)  Green: 40 sec x 1 (straight arm A/P and M/L)  Green 45 sec x 1 (straight arm A/P); green 40 sec x 1 (M/L)           Supination and pronation 4# 30 ea 4# 30 ea 4# 30 ea  4# 30 ea   DB RD ecc 3# 30  3# 30 4# 30  3# 30    Supination and pronation with Hammer+weight        Ther Activity         Dunaway carry with slight elevation of shoulders and scap retraction 9# 3 laps ea  9# 3 laps ea 9# 3 laps ea  9# 3 laps ea   DB hold with towel         DB carrying with head of weight        Medicine Ball Hold Yellow: 30 sec x 3  Yellow: 30 sec x 3 ea NV?  Yellow: 30 sec x 3             Gait Training                          Modalities         MH

## 2025-02-27 NOTE — PROGRESS NOTES
PT Evaluation     Today's date: 2024  Patient name: Theresa Kim  : 1965  MRN: [id number]  Referring provider: Joselin Brumfield DO  Dx:   Encounter Diagnosis     ICD-10-CM    1. Right elbow pain  M25.521               Assessment  Assessment details: Theresa is a pleasant 57 yo female presenting to OP PT secondary to Right elbow pain with insidious onset 1 year ago. She denies DALLIN, and reports no numbness/tingling. She reports having improvements with her strength in being able to hold a watering can approximately 2 gallons when she was not able to do prior.  She also has been busy with work and unable to attend therapy thus indicating continued benefits from skilled OP PT.   Overall, she reports having at least a 75% improvement since I.E. Pt presents continued but improving wrist and elbow strength deficits, periscap strength deficits, and pain at end range elbow/flex ext. Pain is increased along triceps with palmation and increase posterior medial elbow pain with valgus stress test. Pt would benefit from skilled PT to address stated deficits and improve tolerance to daily activiites and improve maximal function.  Impairments: impaired physical strength and pain with function    Symptom irritability: moderateUnderstanding of Dx/Px/POC: good   Prognosis: good    Goals  Pt will demonstrate [place] with progressive home exercise program to assist with PT carry over and prevent recurrence of symptoms in the future - Goal met  Pt will demonstrate 20% improvement in FOTO score to increase tolerance to functional return. - Progressing   Pt will demonstrate 20 deg improvement in shoulder ROM to increase tolerance to reaching overhead, behind neck, and behind back to increase ease with ADLs such dressing/bathing - Progressing   Pt will demonstrate 4+/5 in UE strength to allow for improved tolerance to lifting items overhead. - Progressing   Pt will demonstrate 4+/5 in periscap strength to improve  posture in sitting. - Progressing       Plan  Patient would benefit from: PT eval and skilled physical therapy  Planned modality interventions: TENS, manual electrical stimulation, low level laser therapy, thermotherapy: hydrocollator packs and cryotherapy  Planned therapy interventions: IASTM, joint mobilization, manual therapy, massage, nerve gliding, patient education, neuromuscular re-education, stretching, strengthening, therapeutic activities and therapeutic exercise  Frequency: 2x week  Plan of Care beginning date: 2/27/2025  Plan of Care expiration date: 5/22/2025  Treatment plan discussed with: patient        Subjective Evaluation    History of Present Illness    RE: 2/27/2025: Patient continues to notice when she has not been to therapy with increased sxs in her forearm.     RE: 12/12/24: Patient has not been able to attend as much therapy due to visit limit as well as traveling for work. She has noticed a regression with increased sxs. She also recently fell while away and landed on her R side. She reports feeling more tight today.     RE: 9/12/2024: Patient reports overall sxs have improved however recent pain noted with riding her Ebike.     RE: 6/24/24: Patient reports that she continues to have soreness in her arm and tightness especially when she overworks her arm such as being in the garden. Overall she does report that she has improved in her strength.       RE 4/4/24: Patient was away on vacation for two weeks and reports that her sxs did improve. She did keep up with her nerve glide exercises. The pain has returned since returning to work and using the mouse. She has been at a constant 7/10 pain level this week.     Mechanism of injury: Theresa is a 59 yo female presenting to OP PT secondary to Right elbow pain with onset 1 year ago. Pt reports sx are aggravated with working on computer using mouse and with maintaining elbow in end range positions. She reports weakness with carrying items, working  overhead, and completing yard work. She reports some relief with Advil, massage, and compression stretch.          Recurrent probem    Quality of life: good    Patient Goals  Patient goals for therapy: increased strength  Patient goal: Stronger and less pain;  Pain  Current pain ratin  At best pain ratin  At worst pain ratin  Location: Elbow  Quality: dull ache  Aggravating factors: lifting, sitting, standing and stair climbing, opening jars    Social Support  Stairs in house: yes   Lives in: multiple-level home  Lives with: significant other    Employment status: working  Hand dominance: right      Diagnostic Tests  X-ray: normal  Treatments  No previous or current treatments        Objective     Observations     Right Elbow   Negative for edema, effusion and incisions.     Palpation     Right   Tenderness of the triceps and wrist flexors.     Active Range of Motion     Right Elbow   Normal active range of motion  Elbow hyperextension  Extension: with pain    CROM:  Flexion: TBA  Extension: TBA  Rotation: (R) TBA (L) TBA  Lat: (R) TBA (L) TBA    Passive Range of Motion     Right Elbow   Normal passive range of motion  Extension: with pain    Joint Play     Right Elbow   Joints within functional limits are the humeroulnar joint, humeroradial joint and proximal radioulnar joint.     Strength/Myotome Testing     Right Elbow   Flexion: 4  Extension: 4  Forearm supination: 4  Forearm pronation: 4    Right Wrist/Hand   Wrist extension: 4  Wrist flexion: 4-  Radial deviation: 3+  Ulnar deviation: 4-    UT: (R) 3+/5 (L) 4-/5  MT: (T) 4-/5 (L) 4-/5  LT: (R) 3/5 (L) 3/5        Tests     Right Elbow   Positive active medial epicondylitis, moving valgus stress, passive medial epicondylitis and valgus stress at 30 degrees.   Negative Cozen's, elbow flexion, handshake, milking maneuver, pronator compression, radial tunnel and valgus stress at 0 degrees.       Diagnosis:  Right elbow pain   Precautions:  HTN    Comparable signs 1) End range elbow extension   2) pain with wrist flexion  3)    Primary Impairments: 1)  Right wrist and elbow weakness  2) Right periscap strength deficits   Patient Goals Work at her computer without discomfort            Precautions: No past medical history on file. HTN    Exercises  - Median Nerve Flossing - Tray  - 1 x daily - 7 x weekly - 2 sets - 10 reps  - Ulnar Nerve Flossing  - 1 x daily - 7 x weekly - 2 sets - 10 reps  - Seated Cervical Retraction and Extension  - 1 x daily - 7 x weekly - 3 sets - 10 reps  - Neck Retraction  - 1 x daily - 7 x weekly - 3 sets - 10 reps      FOTO Completed On:     POC Expires Reeval for Medicare to be completed Unit Limit Auth Expiration Date PT/OT/STVisit Limit   3/3/2025 By visit MANAN HINKLE 12/31/2025 60    Completed on visit: MANAN                 TREATMENT DIARY  Auth Status 1/30 2/6 2/27     Approved               Visit # 4 3 6     Visits Remaining 56 [phone]            Manuals 1/30 2/6 2/27 1/23   PROM        Tspine        Elbow mobilization        Taping Tricep                 Tricep IASTM        STM L extensor and flex stretch SMF SMF TPR/STM focused medial and laterally   SMF   IASTM along extensor mass SMF ea (fous on medial aspect of extension mass)    SMF ea (focus on medial aspect of extension mass)   Active release of Extensor mass 10x with wrist extension    10x with wrist extension   L flexion and ext stretch     SMF ea   Cupping        PA glides cervical         Cervical jt mobs                 Neuro Re-Ed        Rhythmic stab        Prone Ys, Ts, Is        Scap squeezes        Prone ext        Median nerve glides With side bend away: 2x10 With side bend away: 2x10 With side bend away: 2x10  With side bend away: 2x10   Ulnar nerve glides NV?    NV?   Radial N glides with head turn to the left With side bend away: 2x10 With side bend away: 2x10 With side bend away: 2x10  With side bend away: 2x10   TB B Scap Retraction         TB B ' S Ext         Seated thoracic ext NV?    NV?   Standing Thoracic Extension with hands behind head with elbows sliding up wall        Bicep stretch using biodex        Pec stretch in doorway 20 sec x 3 ea     NV?   Elbow Flexion stretch c small towel        Seated thoracic extension stretch  (soccer ball)         Sideying Thoracic Rotation stretch        Cerivcal retraction        Cervical retraction to exntesion        Ther Ex        UBE 2 mins ea (fwd/retro) 2 mins ea (fwd/retro) 2 mins ea (fwd/retro)  2 mins ea   (fwd/retro)   Wrist ext/flex DB 4# 30 ea (rolling fingers tips during flex) 4# 30 ea (rolling finger tips during flex)  4# 30 ea (rolling finger tips during aruna)  4# 30 ea (rolling fingers tips during flex)           Diggiflex (all, individual) Red (all): 30; Yellow (individual): 30  Red (all): 30; Yellow (individual): 30 Red (all): 30; Yellow (individual): 30  Red (all): 30; Yellow (individual): 30   Diggiweb ext Yellow: 2 sec x 10 Yellow: 2 sec x 15 Yellow: 2 sec x 15  NV?   Metal gripper 50# 30 50# 30 50# 30  50# 30   Pronation with supination during elbow flexion 6# 30 6# 30 6# 30  6# 30   MB grabs with supination to pronation and then reverse on table Yellow MB: 5 ea Yellow MB: 10 ea NV?  NV?           Tricep ext        Rows, Ext         Wrist flex, ext stretch 20 sec x 3 ea NV 20 sec x 3 ea  20 sec x 3 ea    strengthening         No moneys                 Supination stretch        Tricep stretch        Therastick wrist flexion and extension eccentric Green: 5 sec x 30 ea Green: 5 sec x 30 ea Green: 5 sec x 30 ea  Green: 5 sec x 30 ea   Therastick shakes AP, M/L Green: 45 sec x 1 (straight arm A/P); Green 45 sec x 1 (M/L)  Green: 45 sec x 1 (straight arm A/P); Green: 45 sec x 1 (M/L)  Green: 40 sec x 1 (straight arm A/P and M/L)  Green 45 sec x 1 (straight arm A/P); green 40 sec x 1 (M/L)           Supination and pronation 4# 30 ea 4# 30 ea 4# 30 ea  4# 30 ea   DB RD ecc 3# 30  3# 30 4# 30  3# 30     Supination and pronation with Hammer+weight        Ther Activity         Dunaway carry with slight elevation of shoulders and scap retraction 9# 3 laps ea  9# 3 laps ea 9# 3 laps ea  9# 3 laps ea   DB hold with towel         DB carrying with head of weight        Medicine Ball Hold Yellow: 30 sec x 3  Yellow: 30 sec x 3 ea NV?  Yellow: 30 sec x 3             Gait Training                          Modalities         MH                                                                                                             11-Aug-2022

## 2025-03-03 ENCOUNTER — OFFICE VISIT (OUTPATIENT)
Dept: PHYSICAL THERAPY | Facility: CLINIC | Age: 60
End: 2025-03-03
Payer: COMMERCIAL

## 2025-03-03 DIAGNOSIS — M25.521 RIGHT ELBOW PAIN: Primary | ICD-10-CM

## 2025-03-03 PROCEDURE — 97110 THERAPEUTIC EXERCISES: CPT | Performed by: PHYSICAL THERAPIST

## 2025-03-03 PROCEDURE — 97112 NEUROMUSCULAR REEDUCATION: CPT | Performed by: PHYSICAL THERAPIST

## 2025-03-03 PROCEDURE — 97140 MANUAL THERAPY 1/> REGIONS: CPT | Performed by: PHYSICAL THERAPIST

## 2025-03-03 NOTE — PROGRESS NOTES
Daily Note     Today's date: 3/3/2025  Patient name: Theresa Kim  : 1965  MRN: [id number]  Referring provider: Joselin Brumfield DO  Dx:   Encounter Diagnosis     ICD-10-CM    1. Right elbow pain  M25.521                      Subjective: Patient reports that she was sore after last visit.       Objective: See treatment diary below      Assessment: Tolerated treatment well; initiated POC with UBE for active warmup. Vcs provided on proper sequencing with exercises. MT performed after receiving consent from pt; she has increased tone along medial aspect of extensor mass. Discussed DOMS. Concluded with static stretching. She was encouraged to stretch throughout the day and use modalities if able. Patient demonstrated fatigue post treatment and would benefit from continued PT      Plan: Continue per plan of care.      Precautions: No past medical history on file. HTN    Exercises  - Median Nerve Flossing - Tray  - 1 x daily - 7 x weekly - 2 sets - 10 reps  - Ulnar Nerve Flossing  - 1 x daily - 7 x weekly - 2 sets - 10 reps  - Seated Cervical Retraction and Extension  - 1 x daily - 7 x weekly - 3 sets - 10 reps  - Neck Retraction  - 1 x daily - 7 x weekly - 3 sets - 10 reps      FOTO Completed On:     POC Expires Reeval for Medicare to be completed Unit Limit Auth Expiration Date PT/OT/STVisit Limit   2025 By visit NA NA 2025 60    Completed on visit: MANAN                 TREATMENT DIARY  Auth Status 1/30 2/6 2/27 3/3    Approved               Visit # 4 3 6 7    Visits Remaining 56 [phone]           Manuals 1/30 2/6 2/27 3/3    PROM        Tspine        Elbow mobilization        Taping Tricep                 Tricep IASTM        STM L extensor and flex stretch SMF SMF TPR/STM focused medial and laterally  SMF TPR/STM focused     IASTM along extensor mass SMF ea (fous on medial aspect of extension mass)       Active release of Extensor mass 10x with wrist extension       L flexion and ext stretch         Cupping        PA glides cervical         Cervical jt mobs                 Neuro Re-Ed        Rhythmic stab        Prone Ys, Ts, Is        Scap squeezes        Prone ext        Median nerve glides With side bend away: 2x10 With side bend away: 2x10 With side bend away: 2x10 With side bend away: 2x10    Ulnar nerve glides NV?       Radial N glides with head turn to the left With side bend away: 2x10 With side bend away: 2x10 With side bend away: 2x10 With side bend away: 2x10    TB B Scap Retraction         TB B ' S Ext        Seated thoracic ext NV?       Standing Thoracic Extension with hands behind head with elbows sliding up wall        Bicep stretch using Spodlyex        Pec stretch in doorway 20 sec x 3 ea        Elbow Flexion stretch c small towel        Seated thoracic extension stretch  (soccer ball)         Sideying Thoracic Rotation stretch        Cerivcal retraction        Cervical retraction to exntesion        Ther Ex        UBE 2 mins ea (fwd/retro) 2 mins ea (fwd/retro) 2 mins ea (fwd/retro) 2 mins ea (fwd, retro)     Wrist ext/flex DB 4# 30 ea (rolling fingers tips during flex) 4# 30 ea (rolling finger tips during flex)  4# 30 ea (rolling finger tips during aruna) 4# 30 ea (rolling finger tips during flex)            Diggiflex (all, individual) Red (all): 30; Yellow (individual): 30  Red (all): 30; Yellow (individual): 30 Red (all): 30; Yellow (individual): 30 Red (all): 30; Yellow (individual): 30     Diggiweb ext Yellow: 2 sec x 10 Yellow: 2 sec x 15 Yellow: 2 sec x 15 Yellow: 2 sec x 20    Metal gripper 50# 30 50# 30 50# 30 50# 30    Pronation with supination during elbow flexion 6# 30 6# 30 6# 30 6# 30    MB grabs with supination to pronation and then reverse on table Yellow MB: 5 ea Yellow MB: 10 ea NV? NV?            Tricep ext        Rows, Ext         Wrist flex, ext stretch 20 sec x 3 ea NV 20 sec x 3 ea 20 sec x 3 ea     strengthening         No moneys                 Supination  stretch        Tricep stretch        Therastick wrist flexion and extension eccentric Green: 5 sec x 30 ea Green: 5 sec x 30 ea Green: 5 sec x 30 ea Green: 5 sec x 30 ea    Therastick shakes AP, M/L Green: 45 sec x 1 (straight arm A/P); Green 45 sec x 1 (M/L)  Green: 45 sec x 1 (straight arm A/P); Green: 45 sec x 1 (M/L)  Green: 40 sec x 1 (straight arm A/P and M/L) Green: 45 sec x 1 (straight arm A/P and M/L)             Supination and pronation 4# 30 ea 4# 30 ea 4# 30 ea 4# 30 ea    DB RD ecc 3# 30  3# 30 4# 30 4# 30    Supination and pronation with Hammer+weight        Ther Activity         Dunaway carry with slight elevation of shoulders and scap retraction 9# 3 laps ea  9# 3 laps ea 9# 3 laps ea 9# 3 laps ea    DB hold with towel         DB carrying with head of weight        Medicine Ball Hold Yellow: 30 sec x 3  Yellow: 30 sec x 3 ea NV? NV?             Gait Training                          Modalities         MH

## 2025-03-06 ENCOUNTER — APPOINTMENT (OUTPATIENT)
Dept: PHYSICAL THERAPY | Facility: CLINIC | Age: 60
End: 2025-03-06
Payer: COMMERCIAL

## 2025-03-13 ENCOUNTER — OFFICE VISIT (OUTPATIENT)
Dept: PHYSICAL THERAPY | Facility: CLINIC | Age: 60
End: 2025-03-13
Payer: COMMERCIAL

## 2025-03-13 DIAGNOSIS — M25.521 RIGHT ELBOW PAIN: Primary | ICD-10-CM

## 2025-03-13 PROCEDURE — 97112 NEUROMUSCULAR REEDUCATION: CPT | Performed by: PHYSICAL THERAPIST

## 2025-03-13 PROCEDURE — 97110 THERAPEUTIC EXERCISES: CPT | Performed by: PHYSICAL THERAPIST

## 2025-03-13 PROCEDURE — 97140 MANUAL THERAPY 1/> REGIONS: CPT | Performed by: PHYSICAL THERAPIST

## 2025-03-13 NOTE — PROGRESS NOTES
Daily Note     Today's date: 3/13/2025  Patient name: Theresa Kim  : 1965  MRN: [id number]  Referring provider: Joselin Brumfield DO  Dx:   Encounter Diagnosis     ICD-10-CM    1. Right elbow pain  M25.521                      Subjective: She reports feeling tired today. He was sore after last visit.       Objective: See treatment diary below      Assessment: Tolerated treatment well; initiated POC with UBE for active warmup. Vcs proper sequencing with exercises; resumed MB holding for endurance training for  strength. MT performed after receiving consent from pt; she has increased tone with medial aspect of extensor mass which improves post TPR/STM.   Patient demonstrated fatigue post treatment and would benefit from continued PT      Plan: Continue per plan of care.      Precautions: No past medical history on file. HTN    Exercises  - Median Nerve Flossing - Tray  - 1 x daily - 7 x weekly - 2 sets - 10 reps  - Ulnar Nerve Flossing  - 1 x daily - 7 x weekly - 2 sets - 10 reps  - Seated Cervical Retraction and Extension  - 1 x daily - 7 x weekly - 3 sets - 10 reps  - Neck Retraction  - 1 x daily - 7 x weekly - 3 sets - 10 reps      FOTO Completed On:     POC Expires Reeval for Medicare to be completed Unit Limit Auth Expiration Date PT/OT/STVisit Limit   2025 By visit NA NA 2025 60    Completed on visit: MANAN                 TREATMENT DIARY  Auth Status 1/30 2/6 2/27 3/3 3/11   Approved               Visit # 4 3 6 7 8   Visits Remaining 56 [phone]          Manuals 1/30 2/6 2/27 3/3 3/11   PROM        Tspine        Elbow mobilization        Taping Tricep                 Tricep IASTM        STM L extensor and flex stretch SMF SMF TPR/STM focused medial and laterally  SMF TPR/STM focused  SMF TPR/STM focused    IASTM along extensor mass SMF ea (fous on medial aspect of extension mass)       Active release of Extensor mass 10x with wrist extension       L flexion and ext stretch         Cupping        PA glides cervical         Cervical jt mobs                 Neuro Re-Ed        Rhythmic stab        Prone Ys, Ts, Is        Scap squeezes        Prone ext        Median nerve glides With side bend away: 2x10 With side bend away: 2x10 With side bend away: 2x10 With side bend away: 2x10 With side bend away: 3x10   Ulnar nerve glides NV?       Radial N glides with head turn to the left With side bend away: 2x10 With side bend away: 2x10 With side bend away: 2x10 With side bend away: 2x10 With side bend away: 3x10   TB B Scap Retraction         TB B ' S Ext        Seated thoracic ext NV?       Standing Thoracic Extension with hands behind head with elbows sliding up wall        Bicep stretch using Tizra        Pec stretch in doorway 20 sec x 3 ea        Elbow Flexion stretch c small towel        Seated thoracic extension stretch  (soccer ball)         Sideying Thoracic Rotation stretch        Cerivcal retraction        Cervical retraction to exntesion        Ther Ex        UBE 2 mins ea (fwd/retro) 2 mins ea (fwd/retro) 2 mins ea (fwd/retro) 2 mins ea (fwd, retro)  2 mins ea (fwd, retro)   Wrist ext/flex DB 4# 30 ea (rolling fingers tips during flex) 4# 30 ea (rolling finger tips during flex)  4# 30 ea (rolling finger tips during aruna) 4# 30 ea (rolling finger tips during flex) 4# 30 ea (rolling finger tips during flex)           Diggiflex (all, individual) Red (all): 30; Yellow (individual): 30  Red (all): 30; Yellow (individual): 30 Red (all): 30; Yellow (individual): 30 Red (all): 30; Yellow (individual): 30  Red (all): 30; Yellow (individual): 30   Diggiweb ext Yellow: 2 sec x 10 Yellow: 2 sec x 15 Yellow: 2 sec x 15 Yellow: 2 sec x 20 Yellow: 2 sec x 30   Metal gripper 50# 30 50# 30 50# 30 50# 30 50# 30   Pronation with supination during elbow flexion 6# 30 6# 30 6# 30 6# 30 6# 30   MB grabs with supination to pronation and then reverse on table Yellow MB: 5 ea Yellow MB: 10 ea NV? NV? NV?            Tricep ext        Rows, Ext         Wrist flex, ext stretch 20 sec x 3 ea NV 20 sec x 3 ea 20 sec x 3 ea 20 sec x 3 ea    strengthening         No moneys                 Supination stretch        Tricep stretch        Therastick wrist flexion and extension eccentric Green: 5 sec x 30 ea Green: 5 sec x 30 ea Green: 5 sec x 30 ea Green: 5 sec x 30 ea Green: 5 sec x 30 ea   Therastick shakes AP, M/L Green: 45 sec x 1 (straight arm A/P); Green 45 sec x 1 (M/L)  Green: 45 sec x 1 (straight arm A/P); Green: 45 sec x 1 (M/L)  Green: 40 sec x 1 (straight arm A/P and M/L) Green: 45 sec x 1 (straight arm A/P and M/L)  Green: 45 sec x 1 (straight arm A/P, M/L)           Supination and pronation 4# 30 ea 4# 30 ea 4# 30 ea 4# 30 ea 4# 30 ea   DB RD ecc 3# 30  3# 30 4# 30 4# 30 4# 30    Supination and pronation with Hammer+weight        Ther Activity         Dunaway carry with slight elevation of shoulders and scap retraction 9# 3 laps ea  9# 3 laps ea 9# 3 laps ea 9# 3 laps ea 9# 3 laps ea    DB hold with towel         DB carrying with head of weight        Medicine Ball Hold Yellow: 30 sec x 3  Yellow: 30 sec x 3 ea NV? NV? Yellow: 30 sec x 2             Gait Training                          Modalities         MH

## 2025-03-20 ENCOUNTER — OFFICE VISIT (OUTPATIENT)
Dept: PHYSICAL THERAPY | Facility: CLINIC | Age: 60
End: 2025-03-20
Payer: COMMERCIAL

## 2025-03-20 DIAGNOSIS — M25.521 RIGHT ELBOW PAIN: Primary | ICD-10-CM

## 2025-03-20 PROCEDURE — 97140 MANUAL THERAPY 1/> REGIONS: CPT | Performed by: PHYSICAL THERAPIST

## 2025-03-20 PROCEDURE — 97110 THERAPEUTIC EXERCISES: CPT | Performed by: PHYSICAL THERAPIST

## 2025-03-20 PROCEDURE — 97112 NEUROMUSCULAR REEDUCATION: CPT | Performed by: PHYSICAL THERAPIST

## 2025-03-20 NOTE — PROGRESS NOTES
Daily Note     Today's date: 3/20/2025  Patient name: Theresa Kim  : 1965  MRN: [id number]  Referring provider: Joselin Brumfield DO  Dx:   Encounter Diagnosis     ICD-10-CM    1. Right elbow pain  M25.521                      Subjective: She reports that she continues to be sore after therapy and today is tonight again.       Objective: See treatment diary below      Assessment: Tolerated treatment well; initiated POC with UBE for active warm-up. Vcs provided on proper sequencing with exercises; held returning to pronation and supination using MB. MT performed after receiving consent from pt; STM/TPR along medial and lateral aspects f/b IASTM. She continues to feel tight and encouraged using heat at home with static stretching especially while she is away traveling.   Patient demonstrated fatigue post treatment and would benefit from continued PT      Plan: Continue per plan of care.      Precautions: No past medical history on file. HTN    Exercises  - Median Nerve Flossing - Tray  - 1 x daily - 7 x weekly - 2 sets - 10 reps  - Ulnar Nerve Flossing  - 1 x daily - 7 x weekly - 2 sets - 10 reps  - Seated Cervical Retraction and Extension  - 1 x daily - 7 x weekly - 3 sets - 10 reps  - Neck Retraction  - 1 x daily - 7 x weekly - 3 sets - 10 reps      FOTO Completed On:     POC Expires Reeval for Medicare to be completed Unit Limit Auth Expiration Date PT/OT/STVisit Limit   2025 By visit NA MANAN 2025 60    Completed on visit: MANAN                 TREATMENT DIARY  Auth Status 3/20  2/27 3/3 3/11   Approved               Visit # 9  6 7 8   Visits Remaining 51  54 53 52          Manuals 3/20  2/27 3/3 3/11   PROM        Tspine        Elbow mobilization        Taping Tricep                 Tricep IASTM        STM L extensor and flex stretch SMF TRP/STM focused    SMF TPR/STM focused  SMF TPR/STM focused    IASTM along extensor mass SMF extensor and flexion mass       Active release of Extensor mass         L flexion and ext stretch        Cupping        PA glides cervical         Cervical jt mobs                 Neuro Re-Ed        Rhythmic stab        Prone Ys, Ts, Is        Scap squeezes        Prone ext        Median nerve glides With side bend away: 2x10  With side bend away: 2x10 With side bend away: 2x10 With side bend away: 3x10   Ulnar nerve glides        Radial N glides with head turn to the left With side bend away: 3x10  With side bend away: 2x10 With side bend away: 2x10 With side bend away: 3x10   TB B Scap Retraction         TB B ' S Ext        Seated thoracic ext        Standing Thoracic Extension with hands behind head with elbows sliding up wall        Bicep stretch using [email or web address]        Pec stretch in doorway        Elbow Flexion stretch c small towel        Seated thoracic extension stretch  (soccer ball)         Sideying Thoracic Rotation stretch        Cerivcal retraction        Cervical retraction to exntesion        Ther Ex        UBE 2 mins ea (fwd/retro)  2 mins ea (fwd/retro) 2 mins ea (fwd, retro)  2 mins ea (fwd, retro)   Wrist ext/flex DB 4# 30 ea (rolling finger tips during flex)  4# 30 ea (rolling finger tips during aruna) 4# 30 ea (rolling finger tips during flex) 4# 30 ea (rolling finger tips during flex)           Diggiflex (all, individual) Red (all): 30; Yellow (individual): 30  Red (all): 30; Yellow (individual): 30 Red (all): 30; Yellow (individual): 30  Red (all): 30; Yellow (individual): 30   Diggiweb ext   Yellow: 2 sec x 15 Yellow: 2 sec x 20 Yellow: 2 sec x 30   Metal gripper 50# 30  50# 30 50# 30 50# 30   Pronation with supination during elbow flexion 6# 30  6# 30 6# 30 6# 30   MB grabs with supination to pronation and then reverse on table NV?  NV? NV? NV?           Tricep ext        Rows, Ext         Wrist flex, ext stretch 20 sec x 3 ea  20 sec x 3 ea 20 sec x 3 ea 20 sec x 3 ea    strengthening         No moneys                 Supination stretch        Tricep  stretch        Therastick wrist flexion and extension eccentric Green: 5 sec x 30 ea  Green: 5 sec x 30 ea Green: 5 sec x 30 ea Green: 5 sec x 30 ea   Therastick shakes AP, M/L Green: 45 sec x 1 (straight arm A/P, M/L)  Green: 40 sec x 1 (straight arm A/P and M/L) Green: 45 sec x 1 (straight arm A/P and M/L)  Green: 45 sec x 1 (straight arm A/P, M/L)           Supination and pronation 4# 30 ea  4# 30 ea 4# 30 ea 4# 30 ea   DB RD ecc 4# 30  4# 30 4# 30 4# 30    Supination and pronation with Hammer+weight        Ther Activity         Dunaway carry with slight elevation of shoulders and scap retraction 9# 3 laps ea  9# 3 laps ea 9# 3 laps ea 9# 3 laps ea    DB hold with towel         DB carrying with head of weight        Medicine Ball Hold Yellow: 30 sec x 2   NV? NV? Yellow: 30 sec x 2             Gait Training                          Modalities         MH

## 2025-03-27 ENCOUNTER — APPOINTMENT (OUTPATIENT)
Dept: PHYSICAL THERAPY | Facility: CLINIC | Age: 60
End: 2025-03-27
Payer: COMMERCIAL

## 2025-04-03 ENCOUNTER — OFFICE VISIT (OUTPATIENT)
Dept: PHYSICAL THERAPY | Facility: CLINIC | Age: 60
End: 2025-04-03
Payer: COMMERCIAL

## 2025-04-03 DIAGNOSIS — M25.521 RIGHT ELBOW PAIN: Primary | ICD-10-CM

## 2025-04-03 PROCEDURE — 97112 NEUROMUSCULAR REEDUCATION: CPT | Performed by: PHYSICAL THERAPIST

## 2025-04-03 PROCEDURE — 97140 MANUAL THERAPY 1/> REGIONS: CPT | Performed by: PHYSICAL THERAPIST

## 2025-04-03 PROCEDURE — 97110 THERAPEUTIC EXERCISES: CPT | Performed by: PHYSICAL THERAPIST

## 2025-04-03 NOTE — PROGRESS NOTES
Daily Note     Today's date: 4/3/2025  Patient name: Theresa Kim  : 1965  MRN: [id number]  Referring provider: Joselin Brumfield DO  Dx:   Encounter Diagnosis     ICD-10-CM    1. Right elbow pain  M25.521                      Subjective: She reports that she is tight today. She did not do a lot of yard work that included gripping tasks but did racking.       Objective: See treatment diary below      Assessment: Tolerated treatment well; initiated POC with SciFit for active warmup. Vcs provided on proper sequencing with exercises; overall she did well with program however she fatigues throughout program with increasing soreness. MT performed after receiving consent from pt; STM/TPR increased tone with extensor mass on more medial aspect. She feels looser post session however was sore. Concluded with nerve glides and static stretching.   Patient demonstrated fatigue post treatment and would benefit from continued PT      Plan: Continue per plan of care.      Precautions: No past medical history on file. HTN    Exercises  - Median Nerve Flossing - Tray  - 1 x daily - 7 x weekly - 2 sets - 10 reps  - Ulnar Nerve Flossing  - 1 x daily - 7 x weekly - 2 sets - 10 reps  - Seated Cervical Retraction and Extension  - 1 x daily - 7 x weekly - 3 sets - 10 reps  - Neck Retraction  - 1 x daily - 7 x weekly - 3 sets - 10 reps      FOTO Completed On:     POC Expires Reeval for Medicare to be completed Unit Limit Auth Expiration Date PT/OT/STVisit Limit   2025 By visit NA NA 2025 60    Completed on visit: MANAN                 TREATMENT DIARY  Auth Status 3/20 4/3  3/3 3/11   Approved               Visit # 9 10  7 8   Visits Remaining 51 50  53 52          Manuals 3/20 4/3  3/3 3/11   PROM        Tspine        Elbow mobilization        Taping Tricep                 Tricep IASTM        STM L extensor and flex stretch SMF TRP/STM focused  SMF TPR/STM focused  SMF TPR/STM focused  SMF TPR/STM focused    IASTM  along extensor mass SMF extensor and flexion mass SMF extensor and flexor mass      Active release of Extensor mass        L flexion and ext stretch        Cupping        PA glides cervical         Cervical jt mobs                 Neuro Re-Ed        Rhythmic stab        Prone Ys, Ts, Is        Scap squeezes        Prone ext        Median nerve glides With side bend away: 2x10 With side bend: 3x10  With side bend away: 2x10 With side bend away: 3x10   Ulnar nerve glides        Radial N glides with head turn to the left With side bend away: 3x10 With side bend away: 3x10  With side bend away: 2x10 With side bend away: 3x10   TB B Scap Retraction         TB B ' S Ext        Seated thoracic ext        Standing Thoracic Extension with hands behind head with elbows sliding up wall        Bicep stretch using [employer]        Pec stretch in doorway        Elbow Flexion stretch c small towel        Seated thoracic extension stretch  (soccer ball)         Sideying Thoracic Rotation stretch        Cerivcal retraction        Cervical retraction to exntesion        Ther Ex        UBE 2 mins ea (fwd/retro) 2 mins ea (fwd/retro)  2 mins ea (fwd, retro)  2 mins ea (fwd, retro)   Wrist ext/flex DB 4# 30 ea (rolling finger tips during flex) 4# 30 ea (rolling finger tips during flex)  4# 30 ea (rolling finger tips during flex) 4# 30 ea (rolling finger tips during flex)           Diggiflex (all, individual) Red (all): 30; Yellow (individual): 30 Red (all): 30; Yellow (individual) 30  Red (all): 30; Yellow (individual): 30  Red (all): 30; Yellow (individual): 30   Diggiweb ext    Yellow: 2 sec x 20 Yellow: 2 sec x 30   Metal gripper 50# 30 50# 30  50# 30 50# 30   Pronation with supination during elbow flexion 6# 30 6# 30  6# 30 6# 30   MB grabs with supination to pronation and then reverse on table NV? NV?  NV? NV?           Tricep ext        Rows, Ext         Wrist flex, ext stretch 20 sec x 3 ea 30 sec x 3 ea  20 sec x 3 ea 20 sec x 3 ea     strengthening         No moneys                 Supination stretch        Tricep stretch        Therastick wrist flexion and extension eccentric Green: 5 sec x 30 ea Green: 5 sec x 30 ea  Green: 5 sec x 30 ea Green: 5 sec x 30 ea   Therastick shakes AP, M/L Green: 45 sec x 1 (straight arm A/P, M/L) Green: 45 sec x 1 (straight arm A/P, M/L)  Green: 45 sec x 1 (straight arm A/P and M/L)  Green: 45 sec x 1 (straight arm A/P, M/L)           Supination and pronation 4# 30 ea 4# 30 ea  4# 30 ea 4# 30 ea   DB RD ecc 4# 30 4# 30   4# 30 4# 30    Supination and pronation with Hammer+weight        Ther Activity         Dunaway carry with slight elevation of shoulders and scap retraction 9# 3 laps ea 10# 3 laps ea  9# 3 laps ea 9# 3 laps ea    DB hold with towel         DB carrying with head of weight        Medicine Ball Hold Yellow: 30 sec x 2  Yellow: 30 sec x 3   NV? Yellow: 30 sec x 2             Gait Training                          Modalities         MH

## 2025-04-10 ENCOUNTER — OFFICE VISIT (OUTPATIENT)
Dept: PHYSICAL THERAPY | Facility: CLINIC | Age: 60
End: 2025-04-10
Payer: COMMERCIAL

## 2025-04-10 DIAGNOSIS — M25.521 RIGHT ELBOW PAIN: Primary | ICD-10-CM

## 2025-04-10 PROCEDURE — 97110 THERAPEUTIC EXERCISES: CPT | Performed by: PHYSICAL THERAPIST

## 2025-04-10 PROCEDURE — 97112 NEUROMUSCULAR REEDUCATION: CPT | Performed by: PHYSICAL THERAPIST

## 2025-04-10 PROCEDURE — 97140 MANUAL THERAPY 1/> REGIONS: CPT | Performed by: PHYSICAL THERAPIST

## 2025-04-10 NOTE — PROGRESS NOTES
Daily Note     Today's date: 4/10/2025  Patient name: Theresa Kim  : 1965  MRN: [id number]  Referring provider: Joselin Brumfield DO  Dx:   Encounter Diagnosis     ICD-10-CM    1. Right elbow pain  M25.521                      Subjective: She reports that she was sore after last visit. She has not been able to perform any yard work.       Objective: See treatment diary below      Assessment: Tolerated treatment well; initiated POC with SciFit for active warmup. Vcs provided on proper sequencing with exercises; she demonstrates fatigue post session. MT performed after receiving consent from pt; increased tone with extensor mass which improves post TPR. Concluded with nerve gliding and static wrist stretching.  Patient demonstrated fatigue post treatment and would benefit from continued PT      Plan: Continue per plan of care.      Precautions: No past medical history on file. HTN    Exercises  - Median Nerve Flossing - Tray  - 1 x daily - 7 x weekly - 2 sets - 10 reps  - Ulnar Nerve Flossing  - 1 x daily - 7 x weekly - 2 sets - 10 reps  - Seated Cervical Retraction and Extension  - 1 x daily - 7 x weekly - 3 sets - 10 reps  - Neck Retraction  - 1 x daily - 7 x weekly - 3 sets - 10 reps      FOTO Completed On:     POC Expires Reeval for Medicare to be completed Unit Limit Auth Expiration Date PT/OT/STVisit Limit   2025 By visit NA NA 2025 60    Completed on visit: MANAN                 TREATMENT DIARY  Auth Status 3/20 4/3 4/10  3/11   Approved               Visit # 9 10 11  8   Visits Remaining 51 50 49  52          Manuals 3/20 4/3 4/10  3/11   PROM        Tspine        Elbow mobilization        Taping Tricep                 Tricep IASTM        STM L extensor and flex stretch SMF TRP/STM focused  SMF TPR/STM focused SMF TPR/STM focused  SMF TPR/STM focused    IASTM along extensor mass SMF extensor and flexion mass SMF extensor and flexor mass SMF extensor and flexor mass     Active release  of Extensor mass        L flexion and ext stretch        Cupping        PA glides cervical         Cervical jt mobs                 Neuro Re-Ed        Rhythmic stab        Prone Ys, Ts, Is        Scap squeezes        Prone ext        Median nerve glides With side bend away: 2x10 With side bend: 3x10 With side bend: 3x10  With side bend away: 3x10   Ulnar nerve glides        Radial N glides with head turn to the left With side bend away: 3x10 With side bend away: 3x10 With side bend away: 3x10  With side bend away: 3x10   TB B Scap Retraction         TB B ' S Ext        Seated thoracic ext        Standing Thoracic Extension with hands behind head with elbows sliding up wall        Bicep stretch using [employer] stretch in doorway        Elbow Flexion stretch c small towel        Seated thoracic extension stretch  (soccer ball)         Sideying Thoracic Rotation stretch        Cerivcal retraction        Cervical retraction to exntesion        Ther Ex        UBE 2 mins ea (fwd/retro) 2 mins ea (fwd/retro) 2 mins ea (fwd/retro)  2 mins ea (fwd, retro)   Wrist ext/flex DB 4# 30 ea (rolling finger tips during flex) 4# 30 ea (rolling finger tips during flex) 4# 30 ea (rolling finger tips during flex)  4# 30 ea (rolling finger tips during flex)           Diggiflex (all, individual) Red (all): 30; Yellow (individual): 30 Red (all): 30; Yellow (individual) 30 Red (all): 30; Yellow (individual):30  Red (all): 30; Yellow (individual): 30   Diggiweb ext     Yellow: 2 sec x 30   Metal gripper 50# 30 50# 30 50# 30  50# 30   Pronation with supination during elbow flexion 6# 30 6# 30   6# 30   MB grabs with supination to pronation and then reverse on table NV? NV? NV?  NV?           Tricep ext        Rows, Ext         TB Walk outs with ER iso hold/TB walk outs with IR iso hold   NV?     Wrist flex, ext stretch 20 sec x 3 ea 30 sec x 3 ea 30 sec x 3 ea  20 sec x 3 ea    strengthening         No moneys                  Supination stretch        Tricep stretch        Therastick wrist flexion and extension eccentric Green: 5 sec x 30 ea Green: 5 sec x 30 ea Green: 5 sec x 30 ea   Green: 5 sec x 30 ea   Therastick shakes AP, M/L Green: 45 sec x 1 (straight arm A/P, M/L) Green: 45 sec x 1 (straight arm A/P, M/L) Green: 45 sec x 1 (straight arm A/P, M/L)  Green: 45 sec x 1 (straight arm A/P, M/L)           Supination and pronation 4# 30 ea 4# 30 ea 4# 30 ea  4# 30 ea   DB RD ecc 4# 30 4# 30  4# 30  4# 30    Supination and pronation with Hammer+weight        Ther Activity         Dunaway carry with slight elevation of shoulders and scap retraction 9# 3 laps ea 10# 3 laps ea 10# 3 laps ea  9# 3 laps ea    DB hold with towel         DB carrying with head of weight        Medicine Ball Hold Yellow: 30 sec x 2  Yellow: 30 sec x 3  Yellow: 30 sec x 3   Yellow: 30 sec x 2             Gait Training                          Modalities         MH

## 2025-04-17 ENCOUNTER — OFFICE VISIT (OUTPATIENT)
Dept: PHYSICAL THERAPY | Facility: CLINIC | Age: 60
End: 2025-04-17
Payer: COMMERCIAL

## 2025-04-17 DIAGNOSIS — M25.521 RIGHT ELBOW PAIN: Primary | ICD-10-CM

## 2025-04-17 PROCEDURE — 97110 THERAPEUTIC EXERCISES: CPT | Performed by: PHYSICAL THERAPIST

## 2025-04-17 PROCEDURE — 97140 MANUAL THERAPY 1/> REGIONS: CPT | Performed by: PHYSICAL THERAPIST

## 2025-04-17 PROCEDURE — 97112 NEUROMUSCULAR REEDUCATION: CPT | Performed by: PHYSICAL THERAPIST

## 2025-04-17 NOTE — PROGRESS NOTES
Daily Note     Today's date: 2025  Patient name: Theresa Kim  : 1965  MRN: [id number]  Referring provider: Joselin Brumfield DO  Dx:   Encounter Diagnosis     ICD-10-CM    1. Right elbow pain  M25.521                      Subjective: Patient reports that her forearm was sore after racking over the weekend.       Objective: See treatment diary below      Assessment: Tolerated treatment well; initiated POC with UBE fwd and retrograde. Vcs provided on proper sequencing with exercises; added mid back strengthening and TB B ER. MT performed after receiving consent from pt; STM/TPR along extensor mass focused f/b IASTM. She concluded with self stretching wrist flexor and extensor.   Patient demonstrated fatigue post treatment and would benefit from continued PT      Plan: Continue per plan of care.      Precautions: No past medical history on file. HTN    Exercises  - Median Nerve Flossing - Tray  - 1 x daily - 7 x weekly - 2 sets - 10 reps  - Ulnar Nerve Flossing  - 1 x daily - 7 x weekly - 2 sets - 10 reps  - Seated Cervical Retraction and Extension  - 1 x daily - 7 x weekly - 3 sets - 10 reps  - Neck Retraction  - 1 x daily - 7 x weekly - 3 sets - 10 reps      FOTO Completed On:     POC Expires Reeval for Medicare to be completed Unit Limit Auth Expiration Date PT/OT/STVisit Limit   2025 By visit NA NA 2025 60    Completed on visit: MANAN                 TREATMENT DIARY  Auth Status 3/20 4/3 4/10 4/17    Approved               Visit # 9 10 11 12    Visits Remaining 51 50 49 48           Manuals 3/20 4/3 4/10 4/17    PROM        Tspine        Elbow mobilization        Taping Tricep                 Tricep IASTM        STM L extensor and flex stretch SMF TRP/STM focused  SMF TPR/STM focused SMF TPR/STM focused SMF TPR/STM focused    IASTM along extensor mass SMF extensor and flexion mass SMF extensor and flexor mass SMF extensor and flexor mass SMF extensor and flexor mass    Active release  of Extensor mass        L flexion and ext stretch        Cupping        PA glides cervical         Cervical jt mobs                 Neuro Re-Ed        Rhythmic stab        Prone Ys, Ts, Is        Scap squeezes        Prone ext        Median nerve glides With side bend away: 2x10 With side bend: 3x10 With side bend: 3x10 With side bend: 3x10    Ulnar nerve glides        Radial N glides with head turn to the left With side bend away: 3x10 With side bend away: 3x10 With side bend away: 3x10 With side bend away: 3x10    TB B Scap Retraction         TB B ' S Ext        Seated thoracic ext        Standing Thoracic Extension with hands behind head with elbows sliding up wall        Bicep stretch using [email or web address]        Pec stretch in doorway        Elbow Flexion stretch c small towel        Seated thoracic extension stretch  (soccer ball)         Sideying Thoracic Rotation stretch        Cerivcal retraction        Cervical retraction to exntesion        Ther Ex        UBE 2 mins ea (fwd/retro) 2 mins ea (fwd/retro) 2 mins ea (fwd/retro) 2 mins ea (fwd/retro)    Wrist ext/flex DB 4# 30 ea (rolling finger tips during flex) 4# 30 ea (rolling finger tips during flex) 4# 30 ea (rolling finger tips during flex) 4# 30 ea (rolling finger tips during flex)            Diggiflex (all, individual) Red (all): 30; Yellow (individual): 30 Red (all): 30; Yellow (individual) 30 Red (all): 30; Yellow (individual):30 Red (all): 30; Yellow (individual): 30     Diggiweb flex/ext    Yellow: 2sec x 30    Metal gripper 50# 30 50# 30 50# 30 50# 30    Pronation with supination during elbow flexion 6# 30 6# 30  6# 30    MB grabs with supination to pronation and then reverse on table NV? NV? NV?             Tricep ext        Rows, Ext     BTB 5 sec x 20 ea    TB Walk outs with ER iso hold/TB walk outs with IR iso hold   NV?     Wrist flex, ext stretch 20 sec x 3 ea 30 sec x 3 ea 30 sec x 3 ea 30 sec x 3 ea     strengthening         No moneys     GTB  5 sec x 20            Supination stretch        Tricep stretch        Therastick wrist flexion and extension eccentric Green: 5 sec x 30 ea Green: 5 sec x 30 ea Green: 5 sec x 30 ea  Green: 5 sec x 30 ea     Therastick shakes AP, M/L Green: 45 sec x 1 (straight arm A/P, M/L) Green: 45 sec x 1 (straight arm A/P, M/L) Green: 45 sec x 1 (straight arm A/P, M/L) Green: 45 sec x 1 (straight arm A/P, M/L)             Supination and pronation 4# 30 ea 4# 30 ea 4# 30 ea 4# 30 ea    DB RD ecc 4# 30 4# 30  4# 30 4# 30    Supination and pronation with Hammer+weight        Ther Activity         Dunaway carry with slight elevation of shoulders and scap retraction 9# 3 laps ea 10# 3 laps ea 10# 3 laps ea 9# 3 laps (longer distance)    DB hold with towel         DB carrying with head of weight        Medicine Ball Hold Yellow: 30 sec x 2  Yellow: 30 sec x 3  Yellow: 30 sec x 3  Yellow 30 sec x 3              Gait Training                          Modalities         MH

## 2025-04-24 ENCOUNTER — OFFICE VISIT (OUTPATIENT)
Dept: PHYSICAL THERAPY | Facility: CLINIC | Age: 60
End: 2025-04-24
Payer: COMMERCIAL

## 2025-04-24 DIAGNOSIS — M25.521 RIGHT ELBOW PAIN: Primary | ICD-10-CM

## 2025-04-24 PROCEDURE — 97140 MANUAL THERAPY 1/> REGIONS: CPT | Performed by: PHYSICAL THERAPIST

## 2025-04-24 PROCEDURE — 97112 NEUROMUSCULAR REEDUCATION: CPT | Performed by: PHYSICAL THERAPIST

## 2025-04-24 PROCEDURE — 97110 THERAPEUTIC EXERCISES: CPT | Performed by: PHYSICAL THERAPIST

## 2025-04-24 NOTE — PROGRESS NOTES
Daily Note     Today's date: 2025  Patient name: Theresa Kim  : 1965  MRN: [id number]  Referring provider: Joselin Brumfield DO  Dx:   Encounter Diagnosis     ICD-10-CM    1. Right elbow pain  M25.521                      Subjective: She reports that she did a lot of yard work last week and was very sore even to touch the skin. She has just improved today.       Objective: See treatment diary below      Assessment: Tolerated treatment well; initiated POC with UBE for active warmup. Vcs provided on proper sequencing with exercises; due to soreness from activity last weekend, she fatigues quicker throughout session. MT performed after receiving consent from pt; she demonstrates increased tone along Brachioradialis which slightly improves post ISATM and STM. She was instructed on DOMS and encouraged to use ice for soreness today.  Patient demonstrated fatigue post treatment and would benefit from continued PT      Plan: Continue per plan of care.      Precautions: No past medical history on file. HTN    Exercises  - Median Nerve Flossing - Tray  - 1 x daily - 7 x weekly - 2 sets - 10 reps  - Ulnar Nerve Flossing  - 1 x daily - 7 x weekly - 2 sets - 10 reps  - Seated Cervical Retraction and Extension  - 1 x daily - 7 x weekly - 3 sets - 10 reps  - Neck Retraction  - 1 x daily - 7 x weekly - 3 sets - 10 reps      FOTO Completed On:     POC Expires Reeval for Medicare to be completed Unit Limit Auth Expiration Date PT/OT/STVisit Limit   2025 By visit NA NA 2025 60    Completed on visit: MANAN                 TREATMENT DIARY  Auth Status 3/20 4/3 4/10 4/17 4/24   Approved               Visit # 9 10 11 12 13   Visits Remaining 51 [phone] 47          Manuals 3/20 4/3 4/10 4/17 4/24   PROM        Tspine        Elbow mobilization        Taping Tricep                 Tricep IASTM        STM L extensor and flex stretch SMF TRP/STM focused  SMF TPR/STM focused SMF TPR/STM focused SMF TPR/STM focused  SMF TPR/STM focused   IASTM along extensor mass SMF extensor and flexion mass SMF extensor and flexor mass SMF extensor and flexor mass SMF extensor and flexor mass SMF extensor and flexor mass   Active release of Extensor mass     NV?   L flexion and ext stretch        Cupping        PA glides cervical         Cervical jt mobs                 Neuro Re-Ed        Rhythmic stab        Prone Ys, Ts, Is        Scap squeezes        Prone ext        Median nerve glides With side bend away: 2x10 With side bend: 3x10 With side bend: 3x10 With side bend: 3x10 With side bend: 3x10   Ulnar nerve glides        Radial N glides with head turn to the left With side bend away: 3x10 With side bend away: 3x10 With side bend away: 3x10 With side bend away: 3x10 With side bend away: 3x10   TB B Scap Retraction         TB B ' S Ext        Seated thoracic ext        Standing Thoracic Extension with hands behind head with elbows sliding up wall        Bicep stretch using biodex        Pec stretch in doorway        Elbow Flexion stretch c small towel        Seated thoracic extension stretch  (soccer ball)         Sideying Thoracic Rotation stretch        Cerivcal retraction        Cervical retraction to exntesion        Ther Ex        UBE 2 mins ea (fwd/retro) 2 mins ea (fwd/retro) 2 mins ea (fwd/retro) 2 mins ea (fwd/retro) 2 mins ea (fwd, retro)   Wrist ext/flex DB 4# 30 ea (rolling finger tips during flex) 4# 30 ea (rolling finger tips during flex) 4# 30 ea (rolling finger tips during flex) 4# 30 ea (rolling finger tips during flex) 4# 30 ea (rolling finger tips during flex)           Diggiflex (all, individual) Red (all): 30; Yellow (individual): 30 Red (all): 30; Yellow (individual) 30 Red (all): 30; Yellow (individual):30 Red (all): 30; Yellow (individual): 30  Red (all): 30; Yellow (individual): 30   Diggiweb flex/ext    Yellow: 2sec x 30 Yellow: 2 sec x 30   Metal gripper 50# 30 50# 30 50# 30 50# 30 75# 10   Pronation with  supination during elbow flexion 6# 30 6# 30  6# 30 6# 30   MB grabs with supination to pronation and then reverse on table NV? NV? NV?             Tricep ext        Rows, Ext     BTB 5 sec x 20 ea BTB 5 sec x 20 ea   TB Walk outs with ER iso hold/TB walk outs with IR iso hold   NV?     Wrist flex, ext stretch 20 sec x 3 ea 30 sec x 3 ea 30 sec x 3 ea 30 sec x 3 ea 30 sec x 3 ea    strengthening         No moneys     GTB 5 sec x 20 NV?           Supination stretch        Tricep stretch        Therastick wrist flexion and extension eccentric Green: 5 sec x 30 ea Green: 5 sec x 30 ea Green: 5 sec x 30 ea  Green: 5 sec x 30 ea  Green: 5 sec x 30 ea   Therastick shakes AP, M/L Green: 45 sec x 1 (straight arm A/P, M/L) Green: 45 sec x 1 (straight arm A/P, M/L) Green: 45 sec x 1 (straight arm A/P, M/L) Green: 45 sec x 1 (straight arm A/P, M/L)  Green: 45 sec x 1 ea (straight arm A/P, M/L)           Supination and pronation 4# 30 ea 4# 30 ea 4# 30 ea 4# 30 ea 4# 30 ea   DB RD ecc 4# 30 4# 30  4# 30 4# 30 4# 30   Supination and pronation with Hammer+weight        Ther Activity         Dunaway carry with slight elevation of shoulders and scap retraction 9# 3 laps ea 10# 3 laps ea 10# 3 laps ea 9# 3 laps (longer distance) 10# 3 laps (longer distances   DB hold with towel         DB carrying with head of weight        Medicine Ball Hold Yellow: 30 sec x 2  Yellow: 30 sec x 3  Yellow: 30 sec x 3  Yellow 30 sec x 3  NV?            Gait Training                          Modalities         MH

## 2025-05-01 ENCOUNTER — APPOINTMENT (OUTPATIENT)
Dept: PHYSICAL THERAPY | Facility: CLINIC | Age: 60
End: 2025-05-01
Payer: COMMERCIAL

## 2025-05-08 ENCOUNTER — OFFICE VISIT (OUTPATIENT)
Dept: PHYSICAL THERAPY | Facility: CLINIC | Age: 60
End: 2025-05-08
Payer: COMMERCIAL

## 2025-05-08 DIAGNOSIS — M25.521 RIGHT ELBOW PAIN: Primary | ICD-10-CM

## 2025-05-08 PROCEDURE — 97110 THERAPEUTIC EXERCISES: CPT | Performed by: PHYSICAL THERAPIST

## 2025-05-08 PROCEDURE — 97140 MANUAL THERAPY 1/> REGIONS: CPT | Performed by: PHYSICAL THERAPIST

## 2025-05-08 PROCEDURE — 97112 NEUROMUSCULAR REEDUCATION: CPT | Performed by: PHYSICAL THERAPIST

## 2025-05-08 NOTE — PROGRESS NOTES
Daily Note     Today's date: 2025  Patient name: Theresa Kim  : 1965  MRN: [id number]  Referring provider: Joselin Brumfield DO  Dx:   Encounter Diagnosis     ICD-10-CM    1. Right elbow pain  M25.521                      Subjective: patient reports that she is tight tonight.       Objective: See treatment diary below      Assessment: Tolerated treatment well; initiated POC with UBE for active warmup. Vcs provided on proper sequencing with exercises; modified program due to increased fatigue today. MT performed after receiving consent from pt; she continues to have increased tone with improves with MT. Encouraged to use modalities at home due to DOMS.  Patient demonstrated fatigue post treatment and would benefit from continued PT      Plan: Continue per plan of care.      Precautions: No past medical history on file. HTN    Exercises  - Median Nerve Flossing - Tray  - 1 x daily - 7 x weekly - 2 sets - 10 reps  - Ulnar Nerve Flossing  - 1 x daily - 7 x weekly - 2 sets - 10 reps  - Seated Cervical Retraction and Extension  - 1 x daily - 7 x weekly - 3 sets - 10 reps  - Neck Retraction  - 1 x daily - 7 x weekly - 3 sets - 10 reps      FOTO Completed On:     POC Expires Reeval for Medicare to be completed Unit Limit Auth Expiration Date PT/OT/STVisit Limit   2025 By visit MANAN NA 2025 60    Completed on visit: MANAN                 TREATMENT DIARY  Auth Status 5/8  4/10 4/17 4/24   Approved               Visit # 14  11 12 13   Visits Remaining 46  49 48 47          Manuals 5/8  4/10 4/17 4/24   PROM        Tspine        Elbow mobilization        Taping Tricep                 Tricep IASTM        STM L extensor and flex stretch SMF TPR/STM focused  SMF TPR/STM focused SMF TPR/STM focused SMF TPR/STM focused   IASTM along extensor mass SMF extensor and flexor mass  SMF extensor and flexor mass SMF extensor and flexor mass SMF extensor and flexor mass   Active release of Extensor mass 10x    NV?    L flexion and ext stretch        Cupping        PA glides cervical         Cervical jt mobs                 Neuro Re-Ed        Rhythmic stab        Prone Ys, Ts, Is        Scap squeezes        Prone ext        Median nerve glides With side bend: 3x10  With side bend: 3x10 With side bend: 3x10 With side bend: 3x10   Ulnar nerve glides        Radial N glides with head turn to the left With side bend away: 3x10  With side bend away: 3x10 With side bend away: 3x10 With side bend away: 3x10   TB B Scap Retraction         TB B ' S Ext        Seated thoracic ext        Standing Thoracic Extension with hands behind head with elbows sliding up wall        Bicep stretch using [employer]        Pec stretch in doorway        Elbow Flexion stretch c small towel        Seated thoracic extension stretch  (soccer ball)         Sideying Thoracic Rotation stretch        Cerivcal retraction        Cervical retraction to exntesion        Ther Ex        UBE 2 mins ea (fwd, retro)  2 mins ea (fwd/retro) 2 mins ea (fwd/retro) 2 mins ea (fwd, retro)   Wrist ext/flex DB 4# 30 ea (rolling finger tips during flex)  4# 30 ea (rolling finger tips during flex) 4# 30 ea (rolling finger tips during flex) 4# 30 ea (rolling finger tips during flex)           Diggiflex (all, individual) Red (all): 30; Yellow (individual): 30 ea  Red (all): 30; Yellow (individual):30 Red (all): 30; Yellow (individual): 30  Red (all): 30; Yellow (individual): 30   Diggiweb flex/ext    Yellow: 2sec x 30 Yellow: 2 sec x 30   Metal gripper 75# 10  50# 30 50# 30 75# 10   Pronation with supination during elbow flexion 7# 20   6# 30 6# 30   MB grabs with supination to pronation and then reverse on table   NV?             Tricep ext        Rows, Ext  NV?   BTB 5 sec x 20 ea BTB 5 sec x 20 ea   TB Walk outs with ER iso hold/TB walk outs with IR iso hold   NV?     Wrist flex, ext stretch 30 sec x 3 ea  30 sec x 3 ea 30 sec x 3 ea 30 sec x 3 ea    strengthening         No  moneys  NV?   GTB 5 sec x 20 NV?           Supination stretch        Tricep stretch        Therastick wrist flexion and extension eccentric NV?  Green: 5 sec x 30 ea  Green: 5 sec x 30 ea  Green: 5 sec x 30 ea   Therastick shakes AP, M/L Green: 40 sec x 1 ea (straight arm AP and ML)  Green: 45 sec x 1 (straight arm A/P, M/L) Green: 45 sec x 1 (straight arm A/P, M/L)  Green: 45 sec x 1 ea (straight arm A/P, M/L)           Supination and pronation 4# 30 ea  4# 30 ea 4# 30 ea 4# 30 ea   DB RD ecc 4# 30  4# 30 4# 30 4# 30   Supination and pronation with Hammer+weight        Ther Activity         Dunaway carry with slight elevation of shoulders and scap retraction 10# 3 laps (longer distances)  10# 3 laps ea 9# 3 laps (longer distance) 10# 3 laps (longer distances   DB hold with towel         DB carrying with head of weight        Medicine Ball Hold NV?  Yellow: 30 sec x 3  Yellow 30 sec x 3  NV?            Gait Training                          Modalities         MH

## 2025-05-15 ENCOUNTER — OFFICE VISIT (OUTPATIENT)
Dept: PHYSICAL THERAPY | Facility: CLINIC | Age: 60
End: 2025-05-15
Payer: COMMERCIAL

## 2025-05-15 DIAGNOSIS — M25.521 RIGHT ELBOW PAIN: Primary | ICD-10-CM

## 2025-05-15 PROCEDURE — 97140 MANUAL THERAPY 1/> REGIONS: CPT | Performed by: PHYSICAL THERAPIST

## 2025-05-15 PROCEDURE — 97110 THERAPEUTIC EXERCISES: CPT | Performed by: PHYSICAL THERAPIST

## 2025-05-15 PROCEDURE — 97112 NEUROMUSCULAR REEDUCATION: CPT | Performed by: PHYSICAL THERAPIST

## 2025-05-15 NOTE — LETTER
May 16, 2025    Joselin Brumfield DO  [address] [facility] [address]    Patient: Theresa Kim   YOB: 1965   Date of Visit: 5/15/2025     Encounter Diagnosis     ICD-10-CM    1. Right elbow pain  M25.521           Dear Dr. Joselin Brumfield DO:    Thank you for your recent referral of Theresa Kim. Please review the attached evaluation summary from Theresa's recent visit.     Please verify that you agree with the plan of care by signing the attached order.     If you have any questions or concerns, please do not hesitate to call.     I sincerely appreciate the opportunity to share in the care of one of your patients and hope to have another opportunity to work with you in the near future.       Sincerely,    Kassandra Mane, PT      Referring Provider:      I certify that I have read the below Plan of Care and certify the need for these services furnished under this plan of treatment while under my care.                    Joselin Brumfield DO  [address] [facility] [address]  Via Fax: [phone]          PT Evaluation     Today's date: 2024  Patient name: Theresa Kim  : 1965  MRN: [id number]  Referring provider: Joselin Brumfield DO  Dx:   Encounter Diagnosis     ICD-10-CM    1. Right elbow pain  M25.521               Assessment  Assessment details: Theresa is a pleasant 57 yo female presenting to OP PT secondary to Right elbow pain with insidious onset 1 year ago. She denies DALLIN, and reports no numbness/tingling. She reports having improvements with her strength in being able to hold a watering can approximately 2 gallons when she was not able to do prior.  She also has been busy with work and unable to attend therapy thus indicating continued benefits from skilled OP PT.   Overall, she reports having at least a 75-80% improvement since I.E. Pt presents continued but improving wrist and elbow strength deficits, periscap strength  deficits, and pain at end range elbow/flex ext. Pain is increased along triceps with palmation and increase posterior medial elbow pain with valgus stress test. Pt would benefit from skilled PT to address stated deficits and improve tolerance to daily activiites and improve maximal function.  Impairments: impaired physical strength and pain with function    Symptom irritability: moderateUnderstanding of Dx/Px/POC: good   Prognosis: good    Goals  Pt will demonstrate [place] with progressive home exercise program to assist with PT carry over and prevent recurrence of symptoms in the future - Goal met  Pt will demonstrate 20% improvement in FOTO score to increase tolerance to functional return. - Progressing   Pt will demonstrate 20 deg improvement in shoulder ROM to increase tolerance to reaching overhead, behind neck, and behind back to increase ease with ADLs such dressing/bathing - Progressing   Pt will demonstrate 4+/5 in UE strength to allow for improved tolerance to lifting items overhead. - Progressing   Pt will demonstrate 4+/5 in periscap strength to improve posture in sitting. - Progressing       Plan  Patient would benefit from: PT eval and skilled physical therapy  Planned modality interventions: TENS, manual electrical stimulation, low level laser therapy, thermotherapy: hydrocollator packs and cryotherapy  Planned therapy interventions: IASTM, joint mobilization, manual therapy, massage, nerve gliding, patient education, neuromuscular re-education, stretching, strengthening, therapeutic activities and therapeutic exercise  Frequency: 2x week  Plan of Care beginning date: 5/22/2025  Plan of Care expiration date: 8/7/2025  Treatment plan discussed with: patient        Subjective Evaluation    History of Present Illness    RE: 5/15/2025: Patient reports that she did a lot of yard-work that included weed whacking over the weekend. She is very sore today however she said she would have not been able to  perform activity last year.     RE: 2025: Patient continues to notice when she has not been to therapy with increased sxs in her forearm.     RE: 24: Patient has not been able to attend as much therapy due to visit limit as well as traveling for work. She has noticed a regression with increased sxs. She also recently fell while away and landed on her R side. She reports feeling more tight today.     RE: 2024: Patient reports overall sxs have improved however recent pain noted with riding her Ebike.     RE: 24: Patient reports that she continues to have soreness in her arm and tightness especially when she overworks her arm such as being in the garden. Overall she does report that she has improved in her strength.       RE 24: Patient was away on vacation for two weeks and reports that her sxs did improve. She did keep up with her nerve glide exercises. The pain has returned since returning to work and using the mouse. She has been at a constant 7/10 pain level this week.     Mechanism of injury: Theresa is a 57 yo female presenting to OP PT secondary to Right elbow pain with onset 1 year ago. Pt reports sx are aggravated with working on computer using mouse and with maintaining elbow in end range positions. She reports weakness with carrying items, working overhead, and completing yard work. She reports some relief with Advil, massage, and compression stretch.          Recurrent probem    Quality of life: good    Patient Goals  Patient goals for therapy: increased strength  Patient goal: Stronger and less pain;  Pain  Current pain ratin  At best pain ratin  At worst pain ratin  Location: Elbow  Quality: dull ache  Aggravating factors: lifting, sitting, standing and stair climbing, opening jars    Social Support  Stairs in house: yes   Lives in: multiple-level home  Lives with: significant other    Employment status: working  Hand dominance: right      Diagnostic Tests  X-ray:  normal  Treatments  No previous or current treatments        Objective     Observations     Right Elbow   Negative for edema, effusion and incisions.     Palpation     Right   Tenderness of the triceps and wrist flexors.     Active Range of Motion     Right Elbow   Normal active range of motion  Elbow hyperextension  Extension: with pain    CROM:  Flexion: TBA  Extension: TBA  Rotation: (R) TBA (L) TBA  Lat: (R) TBA (L) TBA    Passive Range of Motion     Right Elbow   Normal passive range of motion  Extension: with pain    Joint Play     Right Elbow   Joints within functional limits are the humeroulnar joint, humeroradial joint and proximal radioulnar joint.     Strength/Myotome Testing     Right Elbow   Flexion: 4  Extension: 4  Forearm supination: 4  Forearm pronation: 4    Right Wrist/Hand   Wrist extension: 4  Wrist flexion: 4-  Radial deviation: 3+  Ulnar deviation: 4-    UT: (R) 3+/5 (L) 4-/5  MT: (T) 4-/5 (L) 4-/5  LT: (R) 3/5 (L) 3/5        Tests     Right Elbow   Positive active medial epicondylitis, moving valgus stress, passive medial epicondylitis and valgus stress at 30 degrees.   Negative Cozen's, elbow flexion, handshake, milking maneuver, pronator compression, radial tunnel and valgus stress at 0 degrees.       Diagnosis:  Right elbow pain   Precautions:  HTN   Comparable signs 1) End range elbow extension   2) pain with wrist flexion  3)    Primary Impairments: 1)  Right wrist and elbow weakness  2) Right periscap strength deficits   Patient Goals Work at her computer without discomfort        Exercises  - Median Nerve Flossing - Tray  - 1 x daily - 7 x weekly - 2 sets - 10 reps  - Ulnar Nerve Flossing  - 1 x daily - 7 x weekly - 2 sets - 10 reps  - Seated Cervical Retraction and Extension  - 1 x daily - 7 x weekly - 3 sets - 10 reps  - Neck Retraction  - 1 x daily - 7 x weekly - 3 sets - 10 reps      FOTO Completed On:     POC Expires Reeval for Medicare to be completed Unit Limit Auth Expiration  Date PT/OT/STVisit Limit   8/7/2025 By visit NA NA 12/31/2025 60    Completed on visit: MANAN                 TREATMENT DIARY  Auth Status 5/8 5/15  4/17 4/24   Approved               Visit # 14 15  12 13   Visits Remaining 46 45  48 47          Manuals 5/8 5/15   4/24   PROM        Tspine        Elbow mobilization        Taping Tricep                 Tricep IASTM        STM L extensor and flex stretch SMF TPR/STM focused SMF TPR/STM focused   SMF TPR/STM focused   IASTM along extensor mass SMF extensor and flexor mass SMF extensor and flexor mass   SMF extensor and flexor mass   Active release of Extensor mass 10x    NV?   L flexion and ext stretch        Cupping        PA glides cervical         Cervical jt mobs                 Neuro Re-Ed        Rhythmic stab        Prone Ys, Ts, Is        Scap squeezes        Prone ext        Median nerve glides With side bend: 3x10 With side bend: 3x10   With side bend: 3x10   Ulnar nerve glides        Radial N glides with head turn to the left With side bend away: 3x10 With side bend away: 3x10   With side bend away: 3x10   TB B Scap Retraction         TB B ' S Ext        Seated thoracic ext        Standing Thoracic Extension with hands behind head with elbows sliding up wall        Bicep stretch using biodex        Pec stretch in doorway        Elbow Flexion stretch c small towel        Seated thoracic extension stretch  (soccer ball)         Sideying Thoracic Rotation stretch        Cerivcal retraction        Cervical retraction to exntesion        Ther Ex        UBE 2 mins ea (fwd, retro) 2 mins ea (fwd, retro)   2 mins ea (fwd, retro)   Wrist ext/flex DB 4# 30 ea (rolling finger tips during flex) 4# 30 ea (rolling finger tips during flex)   4# 30 ea (rolling finger tips during flex)           Diggiflex (all, individual) Red (all): 30; Yellow (individual): 30 ea Red (all): 30; Yellow (individual): 30   Red (all): 30; Yellow (individual): 30   Diggiweb flex/ext  NV?   Yellow: 2  sec x 30   Metal gripper 75# 10 NV?   75# 10   Pronation with supination during elbow flexion 7# 20 NV?   6# 30   MB grabs with supination to pronation and then reverse on table                Tricep ext        Rows, Ext  NV? NV?   BTB 5 sec x 20 ea   TB Walk outs with ER iso hold/TB walk outs with IR iso hold        Wrist flex, ext stretch 30 sec x 3 ea 30 sec x 3 ea   30 sec x 3 ea    strengthening         No moneys  NV? NV   NV?           Supination stretch        Tricep stretch        Therastick wrist flexion and extension eccentric NV? NV?   Green: 5 sec x 30 ea   Therastick shakes AP, M/L Green: 40 sec x 1 ea (straight arm AP and ML) NV?   Green: 45 sec x 1 ea (straight arm A/P, M/L)           Supination and pronation 4# 30 ea 4# 30 ea   4# 30 ea   DB RD ecc 4# 30 4# 30   4# 30   Supination and pronation with Hammer+weight        Ther Activity         Dunaway carry with slight elevation of shoulders and scap retraction 10# 3 laps (longer distances) 10# 3 laps longer distances   10# 3 laps (longer distances   DB hold with towel         DB carrying with head of weight        Medicine Ball Hold NV? NV?   NV?            Gait Training                          Modalities         MH

## 2025-05-15 NOTE — PROGRESS NOTES
PT Evaluation     Today's date: 2024  Patient name: Theresa Kim  : 1965  MRN: [id number]  Referring provider: Joselin Brumfield DO  Dx:   Encounter Diagnosis     ICD-10-CM    1. Right elbow pain  M25.521               Assessment  Assessment details: Theresa is a pleasant 59 yo female presenting to OP PT secondary to Right elbow pain with insidious onset 1 year ago. She denies DALLIN, and reports no numbness/tingling. She reports having improvements with her strength in being able to hold a watering can approximately 2 gallons when she was not able to do prior.  She also has been busy with work and unable to attend therapy thus indicating continued benefits from skilled OP PT.   Overall, she reports having at least a 75-80% improvement since I.E. Pt presents continued but improving wrist and elbow strength deficits, periscap strength deficits, and pain at end range elbow/flex ext. Pain is increased along triceps with palmation and increase posterior medial elbow pain with valgus stress test. Pt would benefit from skilled PT to address stated deficits and improve tolerance to daily activiites and improve maximal function.  Impairments: impaired physical strength and pain with function    Symptom irritability: moderateUnderstanding of Dx/Px/POC: good   Prognosis: good    Goals  Pt will demonstrate [place] with progressive home exercise program to assist with PT carry over and prevent recurrence of symptoms in the future - Goal met  Pt will demonstrate 20% improvement in FOTO score to increase tolerance to functional return. - Progressing   Pt will demonstrate 20 deg improvement in shoulder ROM to increase tolerance to reaching overhead, behind neck, and behind back to increase ease with ADLs such dressing/bathing - Progressing   Pt will demonstrate 4+/5 in UE strength to allow for improved tolerance to lifting items overhead. - Progressing   Pt will demonstrate 4+/5 in periscap strength to  improve posture in sitting. - Progressing       Plan  Patient would benefit from: PT eval and skilled physical therapy  Planned modality interventions: TENS, manual electrical stimulation, low level laser therapy, thermotherapy: hydrocollator packs and cryotherapy  Planned therapy interventions: IASTM, joint mobilization, manual therapy, massage, nerve gliding, patient education, neuromuscular re-education, stretching, strengthening, therapeutic activities and therapeutic exercise  Frequency: 2x week  Plan of Care beginning date: 5/22/2025  Plan of Care expiration date: 8/7/2025  Treatment plan discussed with: patient        Subjective Evaluation    History of Present Illness    RE: 5/15/2025: Patient reports that she did a lot of yard-work that included weed whacking over the weekend. She is very sore today however she said she would have not been able to perform activity last year.     RE: 2/27/2025: Patient continues to notice when she has not been to therapy with increased sxs in her forearm.     RE: 12/12/24: Patient has not been able to attend as much therapy due to visit limit as well as traveling for work. She has noticed a regression with increased sxs. She also recently fell while away and landed on her R side. She reports feeling more tight today.     RE: 9/12/2024: Patient reports overall sxs have improved however recent pain noted with riding her Ebike.     RE: 6/24/24: Patient reports that she continues to have soreness in her arm and tightness especially when she overworks her arm such as being in the garden. Overall she does report that she has improved in her strength.       RE 4/4/24: Patient was away on vacation for two weeks and reports that her sxs did improve. She did keep up with her nerve glide exercises. The pain has returned since returning to work and using the mouse. She has been at a constant 7/10 pain level this week.     Mechanism of injury: Theresa is a 59 yo female presenting to OP PT  secondary to Right elbow pain with onset 1 year ago. Pt reports sx are aggravated with working on computer using mouse and with maintaining elbow in end range positions. She reports weakness with carrying items, working overhead, and completing yard work. She reports some relief with Advil, massage, and compression stretch.          Recurrent probem    Quality of life: good    Patient Goals  Patient goals for therapy: increased strength  Patient goal: Stronger and less pain;  Pain  Current pain ratin  At best pain ratin  At worst pain ratin  Location: Elbow  Quality: dull ache  Aggravating factors: lifting, sitting, standing and stair climbing, opening jars    Social Support  Stairs in house: yes   Lives in: multiple-level home  Lives with: significant other    Employment status: working  Hand dominance: right      Diagnostic Tests  X-ray: normal  Treatments  No previous or current treatments        Objective     Observations     Right Elbow   Negative for edema, effusion and incisions.     Palpation     Right   Tenderness of the triceps and wrist flexors.     Active Range of Motion     Right Elbow   Normal active range of motion  Elbow hyperextension  Extension: with pain    CROM:  Flexion: TBA  Extension: TBA  Rotation: (R) TBA (L) TBA  Lat: (R) TBA (L) TBA    Passive Range of Motion     Right Elbow   Normal passive range of motion  Extension: with pain    Joint Play     Right Elbow   Joints within functional limits are the humeroulnar joint, humeroradial joint and proximal radioulnar joint.     Strength/Myotome Testing     Right Elbow   Flexion: 4  Extension: 4  Forearm supination: 4  Forearm pronation: 4    Right Wrist/Hand   Wrist extension: 4  Wrist flexion: 4-  Radial deviation: 3+  Ulnar deviation: 4-    UT: (R) 3+/5 (L) 4-/5  MT: (T) 4-/5 (L) 4-/5  LT: (R) 3/5 (L) 3/5        Tests     Right Elbow   Positive active medial epicondylitis, moving valgus stress, passive medial epicondylitis and  valgus stress at 30 degrees.   Negative Cozen's, elbow flexion, handshake, milking maneuver, pronator compression, radial tunnel and valgus stress at 0 degrees.       Diagnosis:  Right elbow pain   Precautions:  HTN   Comparable signs 1) End range elbow extension   2) pain with wrist flexion  3)    Primary Impairments: 1)  Right wrist and elbow weakness  2) Right periscap strength deficits   Patient Goals Work at her computer without discomfort        Exercises  - Median Nerve Flossing - Tray  - 1 x daily - 7 x weekly - 2 sets - 10 reps  - Ulnar Nerve Flossing  - 1 x daily - 7 x weekly - 2 sets - 10 reps  - Seated Cervical Retraction and Extension  - 1 x daily - 7 x weekly - 3 sets - 10 reps  - Neck Retraction  - 1 x daily - 7 x weekly - 3 sets - 10 reps      FOTO Completed On:     POC Expires Reeval for Medicare to be completed Unit Limit Auth Expiration Date PT/OT/STVisit Limit   8/7/2025 By visit NA NA 12/31/2025 60    Completed on visit: MANAN                 TREATMENT DIARY  Auth Status 5/8 5/15  4/17 4/24   Approved               Visit # 14 15  12 13   Visits Remaining 46 45  48 47          Manuals 5/8 5/15   4/24   PROM        Tspine        Elbow mobilization        Taping Tricep                 Tricep IASTM        STM L extensor and flex stretch SMF TPR/STM focused SMF TPR/STM focused   SMF TPR/STM focused   IASTM along extensor mass SMF extensor and flexor mass SMF extensor and flexor mass   SMF extensor and flexor mass   Active release of Extensor mass 10x    NV?   L flexion and ext stretch        Cupping        PA glides cervical         Cervical jt mobs                 Neuro Re-Ed        Rhythmic stab        Prone Ys, Ts, Is        Scap squeezes        Prone ext        Median nerve glides With side bend: 3x10 With side bend: 3x10   With side bend: 3x10   Ulnar nerve glides        Radial N glides with head turn to the left With side bend away: 3x10 With side bend away: 3x10   With side bend away: 3x10   TB B  Scap Retraction         TB B ' S Ext        Seated thoracic ext        Standing Thoracic Extension with hands behind head with elbows sliding up wall        Bicep stretch using biodex        Pec stretch in doorway        Elbow Flexion stretch c small towel        Seated thoracic extension stretch  (soccer ball)         Sideying Thoracic Rotation stretch        Cerivcal retraction        Cervical retraction to exntesion        Ther Ex        UBE 2 mins ea (fwd, retro) 2 mins ea (fwd, retro)   2 mins ea (fwd, retro)   Wrist ext/flex DB 4# 30 ea (rolling finger tips during flex) 4# 30 ea (rolling finger tips during flex)   4# 30 ea (rolling finger tips during flex)           Diggiflex (all, individual) Red (all): 30; Yellow (individual): 30 ea Red (all): 30; Yellow (individual): 30   Red (all): 30; Yellow (individual): 30   Diggiweb flex/ext  NV?   Yellow: 2 sec x 30   Metal gripper 75# 10 NV?   75# 10   Pronation with supination during elbow flexion 7# 20 NV?   6# 30   MB grabs with supination to pronation and then reverse on table                Tricep ext        Rows, Ext  NV? NV?   BTB 5 sec x 20 ea   TB Walk outs with ER iso hold/TB walk outs with IR iso hold        Wrist flex, ext stretch 30 sec x 3 ea 30 sec x 3 ea   30 sec x 3 ea    strengthening         No moneys  NV? NV   NV?           Supination stretch        Tricep stretch        Therastick wrist flexion and extension eccentric NV? NV?   Green: 5 sec x 30 ea   Therastick shakes AP, M/L Green: 40 sec x 1 ea (straight arm AP and ML) NV?   Green: 45 sec x 1 ea (straight arm A/P, M/L)           Supination and pronation 4# 30 ea 4# 30 ea   4# 30 ea   DB RD ecc 4# 30 4# 30   4# 30   Supination and pronation with Hammer+weight        Ther Activity         [place] carry with slight elevation of shoulders and scap retraction 10# 3 laps (longer distances) 10# 3 laps longer distances   10# 3 laps (longer distances   DB hold with towel         DB carrying with head  of weight        Medicine Ball Hold NV? NV?   NV?            Gait Training                          Modalities         MH

## 2025-05-29 ENCOUNTER — OFFICE VISIT (OUTPATIENT)
Dept: PHYSICAL THERAPY | Facility: CLINIC | Age: 60
End: 2025-05-29
Payer: COMMERCIAL

## 2025-05-29 DIAGNOSIS — M25.521 RIGHT ELBOW PAIN: Primary | ICD-10-CM

## 2025-05-29 PROCEDURE — 97112 NEUROMUSCULAR REEDUCATION: CPT

## 2025-05-29 PROCEDURE — 97110 THERAPEUTIC EXERCISES: CPT

## 2025-05-29 PROCEDURE — 97140 MANUAL THERAPY 1/> REGIONS: CPT

## 2025-05-29 NOTE — PROGRESS NOTES
Daily Note     Today's date: 2025  Patient name: Theresa Kim  : 1965  MRN: [id number]  Referring provider: Joselin Brumfield DO  Dx:   Encounter Diagnosis     ICD-10-CM    1. Right elbow pain  M25.521                    Subjective: Theresa reports she continues to experience R elbow pain with repetitive activities such as gardening and using keyboard/mouse at work. Does note improvement in RUE strength and feels manuals in PT are helping improve tightness in forearm musculature.       Objective: See treatment diary below      Assessment: Theresa tolerated PT treatment well. Continued with emphasis on wrist flexion and extension strengthening, pt is approprietly challenged with current exercise program with visible fatigue following. She presents with increased restriction at extensor mass, address with STM and IASTM.  Improved tissue tone following. Pt would benefit from continued PT to further address impairments and maximize functional level.       Plan: Continue per plan of care.      Precautions: No past medical history on file. HTN    Exercises  - Median Nerve Flossing - Tray  - 1 x daily - 7 x weekly - 2 sets - 10 reps  - Ulnar Nerve Flossing  - 1 x daily - 7 x weekly - 2 sets - 10 reps  - Seated Cervical Retraction and Extension  - 1 x daily - 7 x weekly - 3 sets - 10 reps  - Neck Retraction  - 1 x daily - 7 x weekly - 3 sets - 10 reps      Diagnosis:  Right elbow pain   Precautions:  HTN   Comparable signs 1) End range elbow extension   2) pain with wrist flexion  3)    Primary Impairments: 1)  Right wrist and elbow weakness  2) Right periscap strength deficits   Patient Goals Work at her computer without discomfort        Exercises  - Median Nerve Flossing - Tray  - 1 x daily - 7 x weekly - 2 sets - 10 reps  - Ulnar Nerve Flossing  - 1 x daily - 7 x weekly - 2 sets - 10 reps  - Seated Cervical Retraction and Extension  - 1 x daily - 7 x weekly - 3 sets - 10 reps  - Neck Retraction  - 1 x  daily - 7 x weekly - 3 sets - 10 reps      FOTO Completed On:     POC Expires Reeval for Medicare to be completed Unit Limit Auth Expiration Date PT/OT/STVisit Limit   8/7/2025 By visit MANAN HINKLE 12/31/2025 60    Completed on visit: MANAN                 TREATMENT DIARY  Auth Status 5/8 5/15 5/29 4/17 4/24   Approved               Visit # 14 15 16 12 13   Visits Remaining 46 45 [phone] 48 47          Manuals 5/8 5/15 5//29  4/24   PROM        Tspine        Elbow mobilization        Taping Tricep                 Tricep IASTM        STM L extensor and flex stretch SMF TPR/STM focused SMF TPR/STM focused JW  TPR/STM focused  SMF TPR/STM focused   IASTM along extensor mass SMF extensor and flexor mass SMF extensor and flexor mass JW  extensor and flexor mass  SMF extensor and flexor mass   Active release of Extensor mass 10x    NV?   L flexion and ext stretch        Cupping        PA glides cervical         Cervical jt mobs                 Neuro Re-Ed        Rhythmic stab        Prone Ys, Ts, Is        Scap squeezes        Prone ext        Median nerve glides With side bend: 3x10 With side bend: 3x10 With side bend: 3x10  With side bend: 3x10   Ulnar nerve glides        Radial N glides with head turn to the left With side bend away: 3x10 With side bend away: 3x10 With side bend away: 3x10  With side bend away: 3x10   TB B Scap Retraction         TB B ' S Ext        Seated thoracic ext        Standing Thoracic Extension with hands behind head with elbows sliding up wall        Bicep stretch using biodex        Pec stretch in doorway        Elbow Flexion stretch c small towel        Seated thoracic extension stretch  (soccer ball)         Sideying Thoracic Rotation stretch        Cerivcal retraction        Cervical retraction to exntesion        Ther Ex        UBE 2 mins ea (fwd, retro) 2 mins ea (fwd, retro) 2 mins ea (fwd, retro)  2 mins ea (fwd, retro)   Wrist ext/flex DB 4# 30 ea (rolling finger tips during flex) 4# 30 ea  (rolling finger tips during flex) 4# 30 ea (rolling finger tips during flex  4# 30 ea (rolling finger tips during flex)           Diggiflex (all, individual) Red (all): 30; Yellow (individual): 30 ea Red (all): 30; Yellow (individual): 30 Red (all): 30; Yellow (individual): 30  Red (all): 30; Yellow (individual): 30   Diggiweb flex/ext  NV? Yellow: 2 sec x 30  Yellow: 2 sec x 30   Metal gripper 75# 10 NV? 75# 10  75# 10   Pronation with supination during elbow flexion 7# 20 NV? 7# 20  6# 30   MB grabs with supination to pronation and then reverse on table                Tricep ext        Rows, Ext  NV? NV?   BTB 5 sec x 20 ea   TB Walk outs with ER iso hold/TB walk outs with IR iso hold        Wrist flex, ext stretch 30 sec x 3 ea 30 sec x 3 ea 30 sec x 3 ea  30 sec x 3 ea    strengthening         No moneys  NV? NV   NV?           Supination stretch        Tricep stretch        Therastick wrist flexion and extension eccentric NV? NV?   Green: 5 sec x 30 ea   Therastick shakes AP, M/L Green: 40 sec x 1 ea (straight arm AP and ML) NV? Green: 40 sec x 1 ea (straight arm AP and ML)  Green: 45 sec x 1 ea (straight arm A/P, M/L)           Supination and pronation 4# 30 ea 4# 30 ea 4# 30 ea  4# 30 ea   DB RD ecc 4# 30 4# 30 4# 30  4# 30   Supination and pronation with Hammer+weight        Ther Activity         [place] carry with slight elevation of shoulders and scap retraction 10# 3 laps (longer distances) 10# 3 laps longer distances 10# 3 laps longer distances  10# 3 laps (longer distances   DB hold with towel         DB carrying with head of weight        Medicine Ball Hold NV? NV?   NV?            Gait Training                          Modalities         MH

## 2025-06-05 ENCOUNTER — OFFICE VISIT (OUTPATIENT)
Dept: PHYSICAL THERAPY | Facility: CLINIC | Age: 60
End: 2025-06-05
Payer: COMMERCIAL

## 2025-06-05 DIAGNOSIS — M25.521 RIGHT ELBOW PAIN: Primary | ICD-10-CM

## 2025-06-05 PROCEDURE — 97112 NEUROMUSCULAR REEDUCATION: CPT | Performed by: PHYSICAL THERAPIST

## 2025-06-05 PROCEDURE — 97110 THERAPEUTIC EXERCISES: CPT | Performed by: PHYSICAL THERAPIST

## 2025-06-05 PROCEDURE — 97140 MANUAL THERAPY 1/> REGIONS: CPT | Performed by: PHYSICAL THERAPIST

## 2025-06-05 NOTE — PROGRESS NOTES
Daily Note     Today's date: 2025  Patient name: Theresa Kim  : 1965  MRN: [id number]  Referring provider: Joselin Brumfield DO  Dx:   Encounter Diagnosis     ICD-10-CM    1. Right elbow pain  M25.521                      Subjective: She reports that she is sore from all her recent gardening.       Objective: See treatment diary below      Assessment: Tolerated treatment well; initiated POC with UBE. Vcs provided on proper sequencing with exercises; resumed medicine ball holds. MT performed after receiving consent from pt; she continues to have increased tenderness along extensor mass and more distally today. Resumed IASTM. She reports having soreness post session and encouraged using heat.  Patient demonstrated fatigue post treatment and would benefit from continued PT      Plan: Continue per plan of care.      Precautions: No past medical history on file. HTN    Exercises  - Median Nerve Flossing - Tray  - 1 x daily - 7 x weekly - 2 sets - 10 reps  - Ulnar Nerve Flossing  - 1 x daily - 7 x weekly - 2 sets - 10 reps  - Seated Cervical Retraction and Extension  - 1 x daily - 7 x weekly - 3 sets - 10 reps  - Neck Retraction  - 1 x daily - 7 x weekly - 3 sets - 10 reps      Diagnosis:  Right elbow pain   Precautions:  HTN   Comparable signs 1) End range elbow extension   2) pain with wrist flexion  3)    Primary Impairments: 1)  Right wrist and elbow weakness  2) Right periscap strength deficits   Patient Goals Work at her computer without discomfort        Exercises  - Median Nerve Flossing - Tray  - 1 x daily - 7 x weekly - 2 sets - 10 reps  - Ulnar Nerve Flossing  - 1 x daily - 7 x weekly - 2 sets - 10 reps  - Seated Cervical Retraction and Extension  - 1 x daily - 7 x weekly - 3 sets - 10 reps  - Neck Retraction  - 1 x daily - 7 x weekly - 3 sets - 10 reps      FOTO Completed On:     POC Expires Reeval for Medicare to be completed Unit Limit Auth Expiration Date PT/OT/STVisit Limit   2025  By visit NA NA 12/31/2025 60    Completed on visit: NA                 TREATMENT DIARY  Auth Status 5/8 5/15 5/29 4/17 4/24   Approved               Visit # 14 15 16 12 13   Visits Remaining 46 [phone] 48 47          Manuals 5/8 5/15 5//29 6/5    PROM        Tspine        Elbow mobilization        Taping Tricep                 Tricep IASTM        STM L extensor and flex stretch SMF TPR/STM focused SMF TPR/STM focused JW  TPR/STM focused SMF TPR/STM focused    IASTM along extensor mass SMF extensor and flexor mass SMF extensor and flexor mass JW  extensor and flexor mass SMF along extensor mass and distally    Active release of Extensor mass 10x       L flexion and ext stretch        Cupping        PA glides cervical         Cervical jt mobs                 Neuro Re-Ed        Rhythmic stab        Prone Ys, Ts, Is        Scap squeezes        Prone ext        Median nerve glides With side bend: 3x10 With side bend: 3x10 With side bend: 3x10 With side bed: 3x10    Ulnar nerve glides        Radial N glides with head turn to the left With side bend away: 3x10 With side bend away: 3x10 With side bend away: 3x10 With side bend away: 3x10    TB B Scap Retraction         TB B ' S Ext        Seated thoracic ext        Standing Thoracic Extension with hands behind head with elbows sliding up wall        Bicep stretch using biodex        Pec stretch in doorway        Elbow Flexion stretch c small towel        Seated thoracic extension stretch  (soccer ball)         Sideying Thoracic Rotation stretch        Cerivcal retraction        Cervical retraction to exntesion        Ther Ex        UBE 2 mins ea (fwd, retro) 2 mins ea (fwd, retro) 2 mins ea (fwd, retro) 2 mins ea (fwd, retro)    Wrist ext/flex DB 4# 30 ea (rolling finger tips during flex) 4# 30 ea (rolling finger tips during flex) 4# 30 ea (rolling finger tips during flex 4# 30 ea (rolling finger tips during flex)            Diggiflex (all, individual) Red (all): 30; Yellow  (individual): 30 ea Red (all): 30; Yellow (individual): 30 Red (all): 30; Yellow (individual): 30 Red (individual): 30; Yellow (all): 30    Diggiweb flex/ext  NV? Yellow: 2 sec x 30 Yellow: 2 sec x 30    Metal gripper 75# 10 NV? 75# 10 75# 30    Pronation with supination during elbow flexion 7# 20 NV? 7# 20 7# 20    MB grabs with supination to pronation and then reverse on table                Tricep ext        Rows, Ext  NV? NV?      TB Walk outs with ER iso hold/TB walk outs with IR iso hold        Wrist flex, ext stretch 30 sec x 3 ea 30 sec x 3 ea 30 sec x 3 ea 30 sec x 3 ea     strengthening         No moneys  NV? NV              Supination stretch        Tricep stretch        Therastick wrist flexion and extension eccentric NV? NV?  5 sec x 30 ea    Therastick shakes AP, M/L Green: 40 sec x 1 ea (straight arm AP and ML) NV? Green: 40 sec x 1 ea (straight arm AP and ML) Green: 45 sec x 1 ea (straight arm AP and ML)            Supination and pronation 4# 30 ea 4# 30 ea 4# 30 ea 4# 30 ea    DB RD ecc 4# 30 4# 30 4# 30 4# 30    Supination and pronation with Hammer+weight        Ther Activity         [place] carry with slight elevation of shoulders and scap retraction 10# 3 laps (longer distances) 10# 3 laps longer distances 10# 3 laps longer distances 10# 3 laps longer distance     DB hold with towel         DB carrying with head of weight        Medicine Ball Hold NV? NV?  Yellow MB: 30 sec x 3              Gait Training                          Modalities         MH

## 2025-06-12 ENCOUNTER — OFFICE VISIT (OUTPATIENT)
Dept: PHYSICAL THERAPY | Facility: CLINIC | Age: 60
End: 2025-06-12
Payer: COMMERCIAL

## 2025-06-12 DIAGNOSIS — M25.521 RIGHT ELBOW PAIN: Primary | ICD-10-CM

## 2025-06-12 PROCEDURE — 97140 MANUAL THERAPY 1/> REGIONS: CPT | Performed by: PHYSICAL THERAPIST

## 2025-06-12 PROCEDURE — 97110 THERAPEUTIC EXERCISES: CPT | Performed by: PHYSICAL THERAPIST

## 2025-06-12 PROCEDURE — 97112 NEUROMUSCULAR REEDUCATION: CPT | Performed by: PHYSICAL THERAPIST

## 2025-06-12 NOTE — PROGRESS NOTES
Daily Note     Today's date: 2025  Patient name: Theresa Kim  : 1965  MRN: [id number]  Referring provider: Joselin Brumfield DO  Dx:   Encounter Diagnosis     ICD-10-CM    1. Right elbow pain  M25.521                      Subjective: She reports that she was sore after last visit.       Objective: See treatment diary below      Assessment: Tolerated treatment well; initiated POC with [employer] for active wram-up. Vcs provided on proper sequencing with exercises. MT performed after receiving consent from pt; she continues to have increased tone with her extensor muscle which improves post STM/TPR however she experiences DOMS post session.    Patient demonstrated fatigue post treatment and would benefit from continued PT      Plan: Continue per plan of care.      Precautions: No past medical history on file. HTN    Exercises  - Median Nerve Flossing - Tray  - 1 x daily - 7 x weekly - 2 sets - 10 reps  - Ulnar Nerve Flossing  - 1 x daily - 7 x weekly - 2 sets - 10 reps  - Seated Cervical Retraction and Extension  - 1 x daily - 7 x weekly - 3 sets - 10 reps  - Neck Retraction  - 1 x daily - 7 x weekly - 3 sets - 10 reps      Diagnosis:  Right elbow pain   Precautions:  HTN   Comparable signs 1) End range elbow extension   2) pain with wrist flexion  3)    Primary Impairments: 1)  Right wrist and elbow weakness  2) Right periscap strength deficits   Patient Goals Work at her computer without discomfort        Exercises  - Median Nerve Flossing - Tray  - 1 x daily - 7 x weekly - 2 sets - 10 reps  - Ulnar Nerve Flossing  - 1 x daily - 7 x weekly - 2 sets - 10 reps  - Seated Cervical Retraction and Extension  - 1 x daily - 7 x weekly - 3 sets - 10 reps  - Neck Retraction  - 1 x daily - 7 x weekly - 3 sets - 10 reps      FOTO Completed On:     POC Expires Reeval for Medicare to be completed Unit Limit Auth Expiration Date PT/OT/STVisit Limit   2025 By visit MANAN HINKLE 2025 60    Completed on visit: MANAN                  TREATMENT DIARY  Auth Status 5/8 5/15 5/29 6/5 6/12   Approved               Visit # 14 15 16 17 18   Visits Remaining 46 45 [phone] 43          Manuals 5/8 5/15 5/29 6/5 6/12   PROM        Tspine        Elbow mobilization        Taping Tricep                 Tricep IASTM        STM L extensor and flex stretch SMF TPR/STM focused SMF TPR/STM focused JW  TPR/STM focused SMF TPR/STM focused SMF TPR/STM focused   IASTM along extensor mass SMF extensor and flexor mass SMF extensor and flexor mass JW  extensor and flexor mass SMF along extensor mass and distally SMF along extensor mass and distally   Active release of Extensor mass 10x       L flexion and ext stretch        Cupping        PA glides cervical         Cervical jt mobs                 Neuro Re-Ed        Rhythmic stab        Prone Ys, Ts, Is        Scap squeezes        Prone ext        Median nerve glides With side bend: 3x10 With side bend: 3x10 With side bend: 3x10 With side bed: 3x10 With side bed: 3x10   Ulnar nerve glides        Radial N glides with head turn to the left With side bend away: 3x10 With side bend away: 3x10 With side bend away: 3x10 With side bend away: 3x10 With side bend away: 3x10   TB B Scap Retraction         TB B ' S Ext        Seated thoracic ext        Standing Thoracic Extension with hands behind head with elbows sliding up wall        Bicep stretch using biodex        Pec stretch in doorway        Elbow Flexion stretch c small towel        Seated thoracic extension stretch  (soccer ball)         Sideying Thoracic Rotation stretch        Cerivcal retraction        Cervical retraction to exntesion        Ther Ex        UBE 2 mins ea (fwd, retro) 2 mins ea (fwd, retro) 2 mins ea (fwd, retro) 2 mins ea (fwd, retro) 2 mins ea (fwd and retro)   Wrist ext/flex DB 4# 30 ea (rolling finger tips during flex) 4# 30 ea (rolling finger tips during flex) 4# 30 ea (rolling finger tips during flex 4# 30 ea (rolling finger tips  during flex) 5# 20 ea (rolling finger tips during flex)           Diggiflex (all, individual) Red (all): 30; Yellow (individual): 30 ea Red (all): 30; Yellow (individual): 30 Red (all): 30; Yellow (individual): 30 Red (individual): 30; Yellow (all): 30 Red (individual): 30; Yellow (all): 30   Diggiweb flex/ext  NV? Yellow: 2 sec x 30 Yellow: 2 sec x 30 Yellow/Green: 2 sec x 30   Metal gripper 75# 10 NV? 75# 10 75# 30 75# 30   Pronation with supination during elbow flexion 7# 20 NV? 7# 20 7# 20 7# 20   MB grabs with supination to pronation and then reverse on table                Tricep ext        Rows, Ext  NV? NV?      TB Walk outs with ER iso hold/TB walk outs with IR iso hold        Wrist flex, ext stretch 30 sec x 3 ea 30 sec x 3 ea 30 sec x 3 ea 30 sec x 3 ea 30 sec x 3 ea    strengthening         No moneys  NV? NV              Supination stretch        Tricep stretch        Therastick wrist flexion and extension eccentric NV? NV?  5 sec x 30 ea 5 sec x 30 ea   Therastick shakes AP, M/L Green: 40 sec x 1 ea (straight arm AP and ML) NV? Green: 40 sec x 1 ea (straight arm AP and ML) Green: 45 sec x 1 ea (straight arm AP and ML) Green: 40 sec x 1 ea (Straight arm A/P and M/L)            Supination and pronation 4# 30 ea 4# 30 ea 4# 30 ea 4# 30 ea 4# 30 ea   DB RD ecc 4# 30 4# 30 4# 30 4# 30 5# 20    Supination and pronation with Hammer+weight        Ther Activity         Dunaway carry with slight elevation of shoulders and scap retraction 10# 3 laps (longer distances) 10# 3 laps longer distances 10# 3 laps longer distances 10# 3 laps longer distance  10# 3 laps longer distance    DB hold with towel         DB carrying with head of weight        Medicine Ball Hold NV? NV?  Yellow MB: 30 sec x 3  Yellow MB: 30 sec x 3             Gait Training                          Modalities         MH

## 2025-06-19 ENCOUNTER — OFFICE VISIT (OUTPATIENT)
Dept: PHYSICAL THERAPY | Facility: CLINIC | Age: 60
End: 2025-06-19
Payer: COMMERCIAL

## 2025-06-19 DIAGNOSIS — M25.521 RIGHT ELBOW PAIN: Primary | ICD-10-CM

## 2025-06-19 PROCEDURE — 97110 THERAPEUTIC EXERCISES: CPT | Performed by: PHYSICAL THERAPIST

## 2025-06-19 PROCEDURE — 97112 NEUROMUSCULAR REEDUCATION: CPT | Performed by: PHYSICAL THERAPIST

## 2025-06-19 PROCEDURE — 97140 MANUAL THERAPY 1/> REGIONS: CPT | Performed by: PHYSICAL THERAPIST

## 2025-06-19 NOTE — PROGRESS NOTES
Daily Note     Today's date: 2025  Patient name: Theresa Kim  : 1965  MRN: [id number]  Referring provider: Joselin Brumfield DO  Dx:   Encounter Diagnosis     ICD-10-CM    1. Right elbow pain  M25.521                      Subjective: patient reports that she was sore after last time but not terribly. She was more sore than usual.       Objective: See treatment diary below      Assessment: Tolerated treatment well. Patient demonstrated fatigue post treatment and would benefit from continued PT      Plan: Continue per plan of care.      Precautions: No past medical history on file. HTN    Exercises  - Median Nerve Flossing - Tray  - 1 x daily - 7 x weekly - 2 sets - 10 reps  - Ulnar Nerve Flossing  - 1 x daily - 7 x weekly - 2 sets - 10 reps  - Seated Cervical Retraction and Extension  - 1 x daily - 7 x weekly - 3 sets - 10 reps  - Neck Retraction  - 1 x daily - 7 x weekly - 3 sets - 10 reps      Diagnosis:  Right elbow pain   Precautions:  HTN   Comparable signs 1) End range elbow extension   2) pain with wrist flexion  3)    Primary Impairments: 1)  Right wrist and elbow weakness  2) Right periscap strength deficits   Patient Goals Work at her computer without discomfort        Exercises  - Median Nerve Flossing - Tray  - 1 x daily - 7 x weekly - 2 sets - 10 reps  - Ulnar Nerve Flossing  - 1 x daily - 7 x weekly - 2 sets - 10 reps  - Seated Cervical Retraction and Extension  - 1 x daily - 7 x weekly - 3 sets - 10 reps  - Neck Retraction  - 1 x daily - 7 x weekly - 3 sets - 10 reps      FOTO Completed On:     POC Expires Reeval for Medicare to be completed Unit Limit Auth Expiration Date PT/OT/STVisit Limit   2025 By visit NA NA 2025 60    Completed on visit: NA                 TREATMENT DIARY  Auth Status    Approved               Visit # 19  16 17 18   Visits Remaining 42  47 [phone] 43          Manuals    PROM        Tspine        Elbow mobilization         Taping Tricep                 Tricep IASTM        STM L extensor and flex stretch SMF TPR/STM focused   SMF TPR/STM focused SMF TPR/STM focused   IASTM along extensor mass SMF along extension mass and distally as well as medial with flexor mass   SMF along extensor mass and distally SMF along extensor mass and distally   Active release of Extensor mass        L flexion and ext stretch        Cupping        PA glides cervical         Cervical jt mobs                 Neuro Re-Ed        Rhythmic stab        Prone Ys, Ts, Is        Scap squeezes        Prone ext        Median nerve glides With side bend: 3x10   With side bed: 3x10 With side bed: 3x10   Ulnar nerve glides        Radial N glides with head turn to the left With side bend away: 3x10   With side bend away: 3x10 With side bend away: 3x10   TB B Scap Retraction         TB B ' S Ext        Seated thoracic ext        Standing Thoracic Extension with hands behind head with elbows sliding up wall        Bicep stretch using biodex        Pec stretch in doorway        Elbow Flexion stretch c small towel        Seated thoracic extension stretch  (soccer ball)         Sideying Thoracic Rotation stretch        Cerivcal retraction        Cervical retraction to exntesion        Ther Ex        UBE 2 mins ea (fwd and retro)   2 mins ea (fwd, retro) 2 mins ea (fwd and retro)   Wrist ext/flex DB 4# 30 ea 5#  4# 30 ea (rolling finger tips during flex) 5# 20 ea (rolling finger tips during flex)           Diggiflex (all, individual) Red (individual): 30; Yellow (all): 30   Red (individual): 30; Yellow (all): 30 Red (individual): 30; Yellow (all): 30   Diggiweb flex/ext Yellow/Green: 2 sec x 30   Yellow: 2 sec x 30 Yellow/Green: 2 sec x 30   Metal gripper 75# 30   75# 30 75# 30   Pronation with supination during elbow flexion 7# 30   7# 20 7# 20   MB grabs with supination to pronation and then reverse on table                Tricep ext        Rows, Ext         TB Walk outs  with ER iso hold/TB walk outs with IR iso hold        Wrist flex, ext stretch 20 sec x 3 ea   30 sec x 3 ea 30 sec x 3 ea    strengthening         No moneys                 Supination stretch        Tricep stretch        Therastick wrist flexion and extension eccentric 5 sec x 30 ea   5 sec x 30 ea 5 sec x 30 ea   Therastick shakes AP, M/L Green: 45 sec x 1 ea (straight arm A/P and M/L)   Green: 45 sec x 1 ea (straight arm AP and ML) Green: 40 sec x 1 ea (Straight arm A/P and M/L)            Supination and pronation 4# 30 ea   4# 30 ea 4# 30 ea   DB RD ecc 4# 30 5#  4# 30 5# 20    Supination and pronation with Hammer+weight        Ther Activity         Dunaway carry with slight elevation of shoulders and scap retraction 10# 3 laps longer distance   10# 3 laps longer distance  10# 3 laps longer distance    DB hold with towel         DB carrying with head of weight        Medicine Ball Hold Yellow MB: 30 sec x 3    Yellow MB: 30 sec x 3  Yellow MB: 30 sec x 3             Gait Training                          Modalities         MH

## 2025-06-26 ENCOUNTER — OFFICE VISIT (OUTPATIENT)
Dept: PHYSICAL THERAPY | Facility: CLINIC | Age: 60
End: 2025-06-26
Payer: COMMERCIAL

## 2025-06-26 DIAGNOSIS — M25.521 RIGHT ELBOW PAIN: Primary | ICD-10-CM

## 2025-06-26 PROCEDURE — 97112 NEUROMUSCULAR REEDUCATION: CPT | Performed by: PHYSICAL THERAPIST

## 2025-06-26 PROCEDURE — 97140 MANUAL THERAPY 1/> REGIONS: CPT | Performed by: PHYSICAL THERAPIST

## 2025-06-26 PROCEDURE — 97110 THERAPEUTIC EXERCISES: CPT | Performed by: PHYSICAL THERAPIST

## 2025-06-26 NOTE — PROGRESS NOTES
Daily Note     Today's date: 2025  Patient name: Theresa Kim  : 1965  MRN: [id number]  Referring provider: Joselin Brumfield DO  Dx:   Encounter Diagnosis     ICD-10-CM    1. Right elbow pain  M25.521                      Subjective: Patient reports that her elbow has been okay. There are certain things she does that causes increased sxs.       Objective: See treatment diary below      Assessment: Tolerated treatment well; initiated POC with UBE for active warmup. Vcs provided on proper sequencing with exercises. MT performed after receiving consent from pt; IASTM performed along extensor mass more focused on flexor mass. Concluded with MH to R elbow.  Patient demonstrated fatigue post treatment and would benefit from continued PT      Plan: Continue per plan of care.      Precautions: No past medical history on file. HTN    Exercises  - Median Nerve Flossing - Tray  - 1 x daily - 7 x weekly - 2 sets - 10 reps  - Ulnar Nerve Flossing  - 1 x daily - 7 x weekly - 2 sets - 10 reps  - Seated Cervical Retraction and Extension  - 1 x daily - 7 x weekly - 3 sets - 10 reps  - Neck Retraction  - 1 x daily - 7 x weekly - 3 sets - 10 reps      Diagnosis:  Right elbow pain   Precautions:  HTN   Comparable signs 1) End range elbow extension   2) pain with wrist flexion  3)    Primary Impairments: 1)  Right wrist and elbow weakness  2) Right periscap strength deficits   Patient Goals Work at her computer without discomfort        Exercises  - Median Nerve Flossing - Tray  - 1 x daily - 7 x weekly - 2 sets - 10 reps  - Ulnar Nerve Flossing  - 1 x daily - 7 x weekly - 2 sets - 10 reps  - Seated Cervical Retraction and Extension  - 1 x daily - 7 x weekly - 3 sets - 10 reps  - Neck Retraction  - 1 x daily - 7 x weekly - 3 sets - 10 reps      FOTO Completed On:     POC Expires Reeval for Medicare to be completed Unit Limit Auth Expiration Date PT/OT/STVisit Limit   2025 By visit NA NA 2025 60     Completed on visit: NA                 TREATMENT DIARY  Auth Status 6/19 6/26 6/5 6/12   Approved               Visit # 19 20  17 18   Visits Remaining 42 41  44 43          Manuals 6/19 6/26 6/12   PROM        Tspine        Elbow mobilization        Taping Tricep                 Tricep IASTM        STM L extensor and flex stretch SMF TPR/STM focused SMF TPR/STM focused   SMF TPR/STM focused   IASTM along extensor mass SMF along extension mass and distally as well as medial with flexor mass SMF along extension mass and distally as well as medial with flexor mass   SMF along extensor mass and distally   Active release of Extensor mass        L flexion and ext stretch        Cupping        PA glides cervical         Cervical jt mobs                 Neuro Re-Ed        Rhythmic stab        Prone Ys, Ts, Is        Scap squeezes        Prone ext        Median nerve glides With side bend: 3x10 With side bend: 3x10   With side bed: 3x10   Ulnar nerve glides        Radial N glides with head turn to the left With side bend away: 3x10 With side bend away: 3x10   With side bend away: 3x10   TB B Scap Retraction         TB B ' S Ext        Seated thoracic ext        Standing Thoracic Extension with hands behind head with elbows sliding up wall        Bicep stretch using biodex        Pec stretch in doorway        Elbow Flexion stretch c small towel        Seated thoracic extension stretch  (soccer ball)         Sideying Thoracic Rotation stretch        Cerivcal retraction        Cervical retraction to exntesion        Ther Ex        UBE 2 mins ea (fwd and retro) 2 min ea (fwd and retro)   2 mins ea (fwd and retro)   Wrist ext/flex DB 4# 30 ea 5# 30 ea   5# 20 ea (rolling finger tips during flex)           Diggiflex (all, individual) Red (individual): 30; Yellow (all): 30 Red (individual): 30; Yellow (all): 30   Red (individual): 30; Yellow (all): 30   Diggiweb flex/ext Yellow/Green: 2 sec x 30 Green: 2 sec x 30    Yellow/Green: 2 sec x 30   Metal gripper 75# 30 75# 30   75# 30   Pronation with supination during elbow flexion 7# 30 7# 30   7# 20   MB grabs with supination to pronation and then reverse on table                Tricep ext        Rows, Ext         TB Walk outs with ER iso hold/TB walk outs with IR iso hold        Wrist flex, ext stretch 20 sec x 3 ea 20 sec x 3 ea   30 sec x 3 ea    strengthening         No moneys                 Supination stretch        Tricep stretch        Therastick wrist flexion and extension eccentric 5 sec x 30 ea 5 sec x 30 ea   5 sec x 30 ea   Therastick shakes AP, M/L Green: 45 sec x 1 ea (straight arm A/P and M/L) Green: 45 esc x 1 ea (straight arm A/P and M/L)   Green: 40 sec x 1 ea (Straight arm A/P and M/L)            Supination and pronation 4# 30 ea 5# 30 ea   4# 30 ea   DB RD ecc 4# 30 5# 30    5# 20    Supination and pronation with Hammer+weight        Ther Activity         Dunaway carry with slight elevation of shoulders and scap retraction 10# 3 laps longer distance 10# 3 laps longer distance    10# 3 laps longer distance    DB hold with towel         DB carrying with head of weight        Medicine Ball Hold Yellow MB: 30 sec x 3  Yellow MB: 30 sec x 3    Yellow MB: 30 sec x 3             Gait Training                          Modalities         MH

## 2025-07-03 ENCOUNTER — APPOINTMENT (OUTPATIENT)
Dept: PHYSICAL THERAPY | Facility: CLINIC | Age: 60
End: 2025-07-03
Payer: COMMERCIAL

## 2025-07-10 ENCOUNTER — APPOINTMENT (OUTPATIENT)
Dept: PHYSICAL THERAPY | Facility: CLINIC | Age: 60
End: 2025-07-10
Payer: COMMERCIAL

## 2025-07-17 ENCOUNTER — OFFICE VISIT (OUTPATIENT)
Dept: PHYSICAL THERAPY | Facility: CLINIC | Age: 60
End: 2025-07-17
Payer: COMMERCIAL

## 2025-07-17 DIAGNOSIS — M25.521 RIGHT ELBOW PAIN: Primary | ICD-10-CM

## 2025-07-17 PROCEDURE — 97110 THERAPEUTIC EXERCISES: CPT | Performed by: PHYSICAL THERAPIST

## 2025-07-17 PROCEDURE — 97112 NEUROMUSCULAR REEDUCATION: CPT | Performed by: PHYSICAL THERAPIST

## 2025-07-17 PROCEDURE — 97140 MANUAL THERAPY 1/> REGIONS: CPT | Performed by: PHYSICAL THERAPIST

## 2025-07-17 NOTE — PROGRESS NOTES
Daily Note     Today's date: 2025  Patient name: Theresa Kim  : 1965  MRN: [id number]  Referring provider: Joselin Brumfield DO  Dx:   Encounter Diagnosis     ICD-10-CM    1. Right elbow pain  M25.521                      Subjective: patient reports that she was ready for this appt because she is very tight from being away.       Objective: See treatment diary below      Assessment: Tolerated treatment well; initiated POC with UBE for active warmup. Vcs provided on proper sequencing with exercises. MT performed after receiving consent from pt; she demonstrates increased tone especially with extension mass which improves post STM/IASTM. She also fatigues quicker tonight throughout program.  Patient demonstrated fatigue post treatment and would benefit from continued PT      Plan: Continue per plan of care.      Precautions: No past medical history on file. HTN    Exercises  - Median Nerve Flossing - Tray  - 1 x daily - 7 x weekly - 2 sets - 10 reps  - Ulnar Nerve Flossing  - 1 x daily - 7 x weekly - 2 sets - 10 reps  - Seated Cervical Retraction and Extension  - 1 x daily - 7 x weekly - 3 sets - 10 reps  - Neck Retraction  - 1 x daily - 7 x weekly - 3 sets - 10 reps      Diagnosis:  Right elbow pain   Precautions:  HTN   Comparable signs 1) End range elbow extension   2) pain with wrist flexion  3)    Primary Impairments: 1)  Right wrist and elbow weakness  2) Right periscap strength deficits   Patient Goals Work at her computer without discomfort        Exercises  - Median Nerve Flossing - Tray  - 1 x daily - 7 x weekly - 2 sets - 10 reps  - Ulnar Nerve Flossing  - 1 x daily - 7 x weekly - 2 sets - 10 reps  - Seated Cervical Retraction and Extension  - 1 x daily - 7 x weekly - 3 sets - 10 reps  - Neck Retraction  - 1 x daily - 7 x weekly - 3 sets - 10 reps      FOTO Completed On:     POC Expires Reeval for Medicare to be completed Unit Limit Auth Expiration Date PT/OT/STVisit Limit   2025  By visit NA NA 12/31/2025 60    Completed on visit: NA                 TREATMENT DIARY  Auth Status 6/19 6/26 7/17 6/12   Approved               Visit # 19 20 21  18   Visits Remaining 42 41 40  43          Manuals 6/19 6/26 7/17     PROM        Tspine        Elbow mobilization        Taping Tricep                 Tricep IASTM        STM L extensor and flex stretch SMF TPR/STM focused SMF TPR/STM focused SMF TPR/STM focused      IASTM along extensor mass SMF along extension mass and distally as well as medial with flexor mass SMF along extension mass and distally as well as medial with flexor mass SMF along extension mass and distally as well as medial with flexor mass      Active release of Extensor mass        L flexion and ext stretch        Cupping        PA glides cervical         Cervical jt mobs                 Neuro Re-Ed        Rhythmic stab        Prone Ys, Ts, Is        Scap squeezes        Prone ext        Median nerve glides With side bend: 3x10 With side bend: 3x10 With side bend: 3x10     Ulnar nerve glides        Radial N glides with head turn to the left With side bend away: 3x10 With side bend away: 3x10 With side bend away: 3x10     TB B Scap Retraction         TB B ' S Ext        Seated thoracic ext        Standing Thoracic Extension with hands behind head with elbows sliding up wall        Bicep stretch using biodex        Pec stretch in doorway        Elbow Flexion stretch c small towel        Seated thoracic extension stretch  (soccer ball)         Sideying Thoracic Rotation stretch        Cerivcal retraction        Cervical retraction to exntesion        Ther Ex        UBE 2 mins ea (fwd and retro) 2 min ea (fwd and retro) 2 mins ea (Fwd and retro)     Wrist ext/flex DB 4# 30 ea 5# 30 ea 4# 20; 5# 5              Diggiflex (all, individual) Red (individual): 30; Yellow (all): 30 Red (individual): 30; Yellow (all): 30 Red (individual): 30; Yellow (all): 30     Diggiweb flex/ext Yellow/Green: 2  sec x 30 Green: 2 sec x 30 Green: 2 sec x 30     Metal gripper 75# 30 75# 30 75# 30     Pronation with supination during elbow flexion 7# 30 7# 30 7# 30     MB grabs with supination to pronation and then reverse on table                Tricep ext        Rows, Ext         TB Walk outs with ER iso hold/TB walk outs with IR iso hold        Wrist flex, ext stretch 20 sec x 3 ea 20 sec x 3 ea 20 sec x 3 ea      strengthening         No moneys                 Supination stretch        Tricep stretch        Therastick wrist flexion and extension eccentric 5 sec x 30 ea 5 sec x 30 ea Green: 5 sec x 30 ea     Therastick shakes AP, M/L Green: 45 sec x 1 ea (straight arm A/P and M/L) Green: 45 esc x 1 ea (straight arm A/P and M/L) Green: 45 sec x 1 ea (straight arm A/P and M/L)              Supination and pronation 4# 30 ea 5# 30 ea 5# 30 ea     DB RD ecc 4# 30 5# 30  5# 30      Supination and pronation with Hammer+weight        Ther Activity         Dunaway carry with slight elevation of shoulders and scap retraction 10# 3 laps longer distance 10# 3 laps longer distance  10# 3 laps longer distance      DB hold with towel         DB carrying with head of weight        Medicine Ball Hold Yellow MB: 30 sec x 3  Yellow MB: 30 sec x 3  Yellow MB: 30 sec x 3               Gait Training                          Modalities

## 2025-07-24 ENCOUNTER — APPOINTMENT (OUTPATIENT)
Dept: PHYSICAL THERAPY | Facility: CLINIC | Age: 60
End: 2025-07-24
Payer: COMMERCIAL

## 2025-07-31 ENCOUNTER — OFFICE VISIT (OUTPATIENT)
Dept: PHYSICAL THERAPY | Facility: CLINIC | Age: 60
End: 2025-07-31
Payer: COMMERCIAL

## 2025-07-31 DIAGNOSIS — M25.521 RIGHT ELBOW PAIN: Primary | ICD-10-CM

## 2025-07-31 PROCEDURE — 97140 MANUAL THERAPY 1/> REGIONS: CPT | Performed by: PHYSICAL THERAPIST

## 2025-07-31 PROCEDURE — 97112 NEUROMUSCULAR REEDUCATION: CPT | Performed by: PHYSICAL THERAPIST

## 2025-07-31 PROCEDURE — 97110 THERAPEUTIC EXERCISES: CPT | Performed by: PHYSICAL THERAPIST

## 2025-08-07 ENCOUNTER — OFFICE VISIT (OUTPATIENT)
Dept: PHYSICAL THERAPY | Facility: CLINIC | Age: 60
End: 2025-08-07
Payer: COMMERCIAL

## 2025-08-07 DIAGNOSIS — M25.521 RIGHT ELBOW PAIN: Primary | ICD-10-CM

## 2025-08-07 PROCEDURE — 97110 THERAPEUTIC EXERCISES: CPT | Performed by: PHYSICAL THERAPIST

## 2025-08-07 PROCEDURE — 97112 NEUROMUSCULAR REEDUCATION: CPT | Performed by: PHYSICAL THERAPIST

## 2025-08-07 PROCEDURE — 97140 MANUAL THERAPY 1/> REGIONS: CPT | Performed by: PHYSICAL THERAPIST

## 2025-08-21 ENCOUNTER — OFFICE VISIT (OUTPATIENT)
Dept: PHYSICAL THERAPY | Facility: CLINIC | Age: 60
End: 2025-08-21
Payer: COMMERCIAL

## 2025-08-21 DIAGNOSIS — M25.521 RIGHT ELBOW PAIN: Primary | ICD-10-CM

## 2025-08-21 PROCEDURE — 97110 THERAPEUTIC EXERCISES: CPT | Performed by: PHYSICAL THERAPIST

## 2025-08-21 PROCEDURE — 97140 MANUAL THERAPY 1/> REGIONS: CPT | Performed by: PHYSICAL THERAPIST

## 2025-08-21 PROCEDURE — 97112 NEUROMUSCULAR REEDUCATION: CPT | Performed by: PHYSICAL THERAPIST
